# Patient Record
Sex: FEMALE | Race: WHITE | NOT HISPANIC OR LATINO | Employment: OTHER | URBAN - METROPOLITAN AREA
[De-identification: names, ages, dates, MRNs, and addresses within clinical notes are randomized per-mention and may not be internally consistent; named-entity substitution may affect disease eponyms.]

---

## 2017-01-03 ENCOUNTER — OFFICE VISIT (OUTPATIENT)
Dept: INTERNAL MEDICINE | Age: 82
End: 2017-01-03

## 2017-01-03 VITALS
HEART RATE: 78 BPM | HEIGHT: 67 IN | DIASTOLIC BLOOD PRESSURE: 78 MMHG | SYSTOLIC BLOOD PRESSURE: 140 MMHG | TEMPERATURE: 97.7 F | BODY MASS INDEX: 22.91 KG/M2 | WEIGHT: 146 LBS | OXYGEN SATURATION: 98 %

## 2017-01-03 DIAGNOSIS — R05.9 COUGH: ICD-10-CM

## 2017-01-03 DIAGNOSIS — G62.9 PERIPHERAL POLYNEUROPATHY: ICD-10-CM

## 2017-01-03 DIAGNOSIS — Z00.00 MEDICARE ANNUAL WELLNESS VISIT, INITIAL: Primary | ICD-10-CM

## 2017-01-03 DIAGNOSIS — Z23 ENCOUNTER FOR IMMUNIZATION: ICD-10-CM

## 2017-01-03 DIAGNOSIS — E03.9 HYPOTHYROIDISM, UNSPECIFIED TYPE: ICD-10-CM

## 2017-01-03 DIAGNOSIS — E78.5 HYPERLIPIDEMIA, UNSPECIFIED HYPERLIPIDEMIA TYPE: ICD-10-CM

## 2017-01-03 DIAGNOSIS — I10 ESSENTIAL HYPERTENSION: ICD-10-CM

## 2017-01-03 PROCEDURE — 99214 OFFICE O/P EST MOD 30 MIN: CPT | Performed by: INTERNAL MEDICINE

## 2017-01-03 PROCEDURE — 90670 PCV13 VACCINE IM: CPT | Performed by: INTERNAL MEDICINE

## 2017-01-03 PROCEDURE — G0009 ADMIN PNEUMOCOCCAL VACCINE: HCPCS | Performed by: INTERNAL MEDICINE

## 2017-01-03 PROCEDURE — G0439 PPPS, SUBSEQ VISIT: HCPCS | Performed by: INTERNAL MEDICINE

## 2017-01-03 RX ORDER — AMITRIPTYLINE HYDROCHLORIDE 25 MG/1
25 TABLET, FILM COATED ORAL NIGHTLY
Qty: 30 TABLET | Refills: 1 | Status: SHIPPED | OUTPATIENT
Start: 2017-01-03 | End: 2017-02-14 | Stop reason: SINTOL

## 2017-01-03 RX ORDER — AZELASTINE HYDROCHLORIDE, FLUTICASONE PROPIONATE 137; 50 UG/1; UG/1
2 SPRAY, METERED NASAL 2 TIMES DAILY
Qty: 1 BOTTLE | Refills: 1 | OUTPATIENT
Start: 2017-01-03 | End: 2021-06-13

## 2017-01-03 NOTE — MR AVS SNAPSHOT
Angela Quinn   1/3/2017 1:00 PM   Office Visit    Dept Phone:  [phone]   Encounter #:  [id number]    Provider:  Mychal Soliz MD   Department:  [facility] PRIMARY CARE                Your Full Care Plan              Today's Medication Changes          These changes are accurate as of: 1/3/17  1:53 PM.  If you have any questions, ask your nurse or doctor.               New Medication(s)Ordered:     amitriptyline 25 MG tablet   Commonly known as:  ELAVIL   Take 1 tablet by mouth Every Night.   Started by:  Mychal Soliz MD       Azelastine-Fluticasone 137-50 MCG/ACT suspension   2 sprays into each nostril 2 (Two) Times a Day.   Started by:  Mychal Soliz MD            Where to Get Your Medications      These medications were sent to [facility] [address] [place] [address] - [address] [place] [address] - [phone]  - [phone] [place] [address]     Phone:  [phone]     amitriptyline 25 MG tablet    Azelastine-Fluticasone 137-50 MCG/ACT suspension                  Your Updated Medication List          This list is accurate as of: 1/3/17  1:53 PM.  Always use your most recent med list.                alendronate 35 MG tablet   Commonly known as:  FOSAMAX   Take 1 tablet by mouth every 7 days.       amitriptyline 25 MG tablet   Commonly known as:  ELAVIL   Take 1 tablet by mouth Every Night.       anastrozole 1 MG tablet   Commonly known as:  ARIMIDEX   TAKE 1 TABLET BY MOUTH ONE TIME A DAY       atorvastatin 10 MG tablet   Commonly known as:  LIPITOR   Take 1 tablet by mouth daily.       Azelastine-Fluticasone 137-50 MCG/ACT suspension   2 sprays into each nostril 2 (Two) Times a Day.       escitalopram 20 MG tablet   Commonly known as:  LEXAPRO   Take 0.5 tablets by mouth daily.       hydrochlorothiazide 25 MG tablet   Commonly known as:  HYDRODIURIL   Take 1 tablet by mouth daily.       levothyroxine 75 MCG tablet   Commonly known  as:  SYNTHROID, LEVOTHROID   Take 1 tablet by mouth daily.               We Performed the Following     CBC & Differential     Comprehensive Metabolic Panel     Lipid Panel     Pneumococcal Conjugate Vaccine 13-Valent All     Protein Electrophoresis, Total     RPR     TSH     Vitamin B12       You Were Diagnosed With        Codes Comments    Medicare annual wellness visit, initial    -  Primary ICD-10-CM: Z00.00  ICD-9-CM: V70.0     Essential hypertension     ICD-10-CM: I10  ICD-9-CM: 401.9     Hypothyroidism, unspecified type     ICD-10-CM: E03.9  ICD-9-CM: 244.9     Hyperlipidemia, unspecified hyperlipidemia type     ICD-10-CM: E78.5  ICD-9-CM: 272.4     Encounter for immunization     ICD-10-CM: Z23  ICD-9-CM: V03.89     Cough     ICD-10-CM: R05  ICD-9-CM: 786.2     Peripheral polyneuropathy     ICD-10-CM: G62.9  ICD-9-CM: 356.9       Instructions      Medicare Wellness  Personal Prevention Plan of Service     Date of Office Visit:  2017  Encounter Provider:  Mychal Soliz MD  Place of Service:  [facility] PRIMARY CARE  Patient Name: Angela Quinn  :  1931    As part of the Medicare Wellness portion of your visit today, we are providing you with this personalized preventive plan of services (PPPS). This plan is based upon recommendations of the [place] Preventive Services Task Force (USPSTF) and the Advisory Committee on Immunization Practices (ACIP).    This lists the preventive care services that should be considered, and provides dates of when you are due. Items listed as completed are up-to-date and do not require any further intervention.    Health Maintenance   Topic Date Due   • LIPID PANEL  10/18/2017   • COLONOSCOPY  2017   • MEDICARE ANNUAL WELLNESS  2018   • DXA SCAN  2018   • MAMMOGRAM  2018   • TDAP/TD VACCINES (2 - Td) 2026   • INFLUENZA VACCINE  Completed   • PNEUMOCOCCAL VACCINES (65+ LOW/MEDIUM RISK)  Completed   • ZOSTER  VACCINE  Completed                Patient Instructions History      Upcoming Appointments     Visit Type Date Time Department    WELCOME TO MEDICARE 1/3/2017  1:00 PM [facility]    [facility] 2017  9:30 AM [facility]    OFFICE VISIT 2017  2:00 PM [facility]    LAB 5/15/2017 10:00 AM  [facility]    FOLLOW UP 1 UNIT 5/15/2017 10:40 AM [facility]      Amigo[employer]art Signup     [facility] [employer] allows you to send messages to your doctor, view your test results, renew your prescriptions, schedule appointments, and more. To sign up, go to [employer] and click on the Sign Up Now link in the New User? box. Enter your [employer] Activation Code exactly as it appears below along with the last four digits of your Social Security Number and your Date of Birth () to complete the sign-up process. If you do not sign up before the expiration date, you must request a new code.    Aff[employer] Activation Code: [email or web address]  Expires: 2017  1:51 PM    If you have questions, you can email [email or web address] or call [phone] to talk to our [employer] staff. Remember, [employer] is NOT to be used for urgent needs. For medical emergencies, dial 911.               Other Info from Your Visit           Your Appointments     2017  9:30 AM EST   [facility] (--[facility]    [address] [facility] [address]  [facility] [id number] [phone]            2017  2:00 PM EST   Office Visit with Mychal Soliz MD   [facility] PRIMARY CARE (--)    [address] [facility] [address]  [facility] [address] [phone]           Arrive 15 minutes prior to appointment.            May 15, 2017 10:00 AM EDT   Lab with LAB CHAIR 2 [facility] ([place])    [address] [address]   [phone]            May 15, 2017 10:40 AM EDT   FOLLOW UP  "with Marla Lyons MD   [facility]: [facility] BLOOD DISORDERS AND CANCER ([facility] [place])    [address] [address] [phone]              Allergies     No Known Drug Allergy        Reason for Visit     Annual Exam AWV      Vital Signs     Blood Pressure Pulse Temperature Height Weight Oxygen Saturation    140/78 78 97.7 °F (36.5 °C) 67\" (170.2 cm) 146 lb (66.2 kg) 98%    Body Mass Index Smoking Status                22.87 kg/m2 Never Smoker          Problems and Diagnoses Noted     Cough    Encounter for immunization    High cholesterol or triglycerides    High blood pressure    Underactive thyroid    Medicare annual wellness visit, initial    Sleep apnea    Disease of nerves in the arms, legs, hands and feet      Immunizations Administered     Name Date    Pneumococcal Conjugate 13-Valent         "

## 2017-01-03 NOTE — PATIENT INSTRUCTIONS
Medicare Wellness  Personal Prevention Plan of Service     Date of Office Visit:  2017  Encounter Provider:  Mychal Soliz MD  Place of Service:  [facility] PRIMARY CARE  Patient Name: Angela Quinn  :  1931    As part of the Medicare Wellness portion of your visit today, we are providing you with this personalized preventive plan of services (PPPS). This plan is based upon recommendations of the [place] Preventive Services Task Force (USPSTF) and the Advisory Committee on Immunization Practices (ACIP).    This lists the preventive care services that should be considered, and provides dates of when you are due. Items listed as completed are up-to-date and do not require any further intervention.    Health Maintenance   Topic Date Due   • LIPID PANEL  10/18/2017   • COLONOSCOPY  2017   • MEDICARE ANNUAL WELLNESS  2018   • DXA SCAN  2018   • MAMMOGRAM  2018   • TDAP/TD VACCINES (2 - Td) 2026   • INFLUENZA VACCINE  Completed   • PNEUMOCOCCAL VACCINES (65+ LOW/MEDIUM RISK)  Completed   • ZOSTER VACCINE  Completed

## 2017-01-05 ENCOUNTER — TELEPHONE (OUTPATIENT)
Dept: INTERNAL MEDICINE | Age: 82
End: 2017-01-05

## 2017-01-05 LAB
ALBUMIN SERPL ELPH-MCNC: 3.7 G/DL (ref 2.9–4.4)
ALBUMIN SERPL-MCNC: 4.3 G/DL (ref 3.5–5.2)
ALBUMIN/GLOB SERPL: 1 {RATIO} (ref 0.7–1.7)
ALBUMIN/GLOB SERPL: 1.4 G/DL
ALP SERPL-CCNC: 106 U/L (ref 39–117)
ALPHA1 GLOB SERPL ELPH-MCNC: 0.3 G/DL (ref 0–0.4)
ALPHA2 GLOB SERPL ELPH-MCNC: 0.7 G/DL (ref 0.4–1)
ALT SERPL-CCNC: 13 U/L (ref 1–33)
AST SERPL-CCNC: 22 U/L (ref 1–32)
B-GLOBULIN SERPL ELPH-MCNC: 1.3 G/DL (ref 0.7–1.3)
BASOPHILS # BLD AUTO: 0.04 10*3/MM3 (ref 0–0.2)
BASOPHILS NFR BLD AUTO: 0.8 % (ref 0–1.5)
BILIRUB SERPL-MCNC: 0.9 MG/DL (ref 0.1–1.2)
BUN SERPL-MCNC: 11 MG/DL (ref 8–23)
BUN/CREAT SERPL: 15.7 (ref 7–25)
CALCIUM SERPL-MCNC: 9.5 MG/DL (ref 8.6–10.5)
CHLORIDE SERPL-SCNC: 94 MMOL/L (ref 98–107)
CHOLEST SERPL-MCNC: 179 MG/DL (ref 0–200)
CO2 SERPL-SCNC: 32.2 MMOL/L (ref 22–29)
CREAT SERPL-MCNC: 0.7 MG/DL (ref 0.57–1)
EOSINOPHIL # BLD AUTO: 0.11 10*3/MM3 (ref 0–0.7)
EOSINOPHIL NFR BLD AUTO: 2.1 % (ref 0.3–6.2)
ERYTHROCYTE [DISTWIDTH] IN BLOOD BY AUTOMATED COUNT: 13.3 % (ref 11.7–13)
GAMMA GLOB SERPL ELPH-MCNC: 1.4 G/DL (ref 0.4–1.8)
GLOBULIN SER CALC-MCNC: 3.1 GM/DL
GLOBULIN SER CALC-MCNC: 3.7 G/DL (ref 2.2–3.9)
GLUCOSE SERPL-MCNC: 98 MG/DL (ref 65–99)
HCT VFR BLD AUTO: 41.8 % (ref 35.6–45.5)
HDLC SERPL-MCNC: 67 MG/DL (ref 40–60)
HGB BLD-MCNC: 13.4 G/DL (ref 11.9–15.5)
IMM GRANULOCYTES # BLD: 0 10*3/MM3 (ref 0–0.03)
IMM GRANULOCYTES NFR BLD: 0 % (ref 0–0.5)
LDLC SERPL CALC-MCNC: 82 MG/DL (ref 0–100)
LYMPHOCYTES # BLD AUTO: 1.5 10*3/MM3 (ref 0.9–4.8)
LYMPHOCYTES NFR BLD AUTO: 28.7 % (ref 19.6–45.3)
Lab: NORMAL
M PROTEIN SERPL ELPH-MCNC: NORMAL G/DL
MCH RBC QN AUTO: 32 PG (ref 26.9–32)
MCHC RBC AUTO-ENTMCNC: 32.1 G/DL (ref 32.4–36.3)
MCV RBC AUTO: 99.8 FL (ref 80.5–98.2)
MONOCYTES # BLD AUTO: 0.55 10*3/MM3 (ref 0.2–1.2)
MONOCYTES NFR BLD AUTO: 10.5 % (ref 5–12)
NEUTROPHILS # BLD AUTO: 3.02 10*3/MM3 (ref 1.9–8.1)
NEUTROPHILS NFR BLD AUTO: 57.9 % (ref 42.7–76)
PLATELET # BLD AUTO: 221 10*3/MM3 (ref 140–500)
POTASSIUM SERPL-SCNC: 3.5 MMOL/L (ref 3.5–5.2)
PROT SERPL-MCNC: 7.4 G/DL (ref 6–8.5)
RBC # BLD AUTO: 4.19 10*6/MM3 (ref 3.9–5.2)
RPR SER QL: NORMAL
SODIUM SERPL-SCNC: 139 MMOL/L (ref 136–145)
TRIGL SERPL-MCNC: 148 MG/DL (ref 0–150)
TSH SERPL DL<=0.005 MIU/L-ACNC: 3.54 MIU/ML (ref 0.27–4.2)
VIT B12 SERPL-MCNC: 514 PG/ML (ref 211–946)
VLDLC SERPL CALC-MCNC: 29.6 MG/DL (ref 5–40)
WBC # BLD AUTO: 5.22 10*3/MM3 (ref 4.5–10.7)

## 2017-01-05 NOTE — TELEPHONE ENCOUNTER
PA for Dymista 137-[facility]/ACT denied via [employer]. Appeal letter will not be sent. Pt will have to try OTC fluticasone per Dr. Soliz.     KD

## 2017-01-13 ENCOUNTER — TELEPHONE (OUTPATIENT)
Dept: INTERNAL MEDICINE | Age: 82
End: 2017-01-13

## 2017-02-13 RX ORDER — ATORVASTATIN CALCIUM 10 MG/1
TABLET, FILM COATED ORAL
Qty: 30 TABLET | Refills: 3 | Status: SHIPPED | OUTPATIENT
Start: 2017-02-13 | End: 2017-11-27 | Stop reason: SDUPTHER

## 2017-02-14 ENCOUNTER — OFFICE VISIT (OUTPATIENT)
Dept: INTERNAL MEDICINE | Age: 82
End: 2017-02-14

## 2017-02-14 VITALS
BODY MASS INDEX: 23.02 KG/M2 | WEIGHT: 146.7 LBS | HEIGHT: 67 IN | TEMPERATURE: 97 F | OXYGEN SATURATION: 97 % | HEART RATE: 80 BPM

## 2017-02-14 DIAGNOSIS — I10 ESSENTIAL HYPERTENSION: ICD-10-CM

## 2017-02-14 DIAGNOSIS — G62.9 PERIPHERAL POLYNEUROPATHY: Primary | ICD-10-CM

## 2017-02-14 PROCEDURE — 99213 OFFICE O/P EST LOW 20 MIN: CPT | Performed by: INTERNAL MEDICINE

## 2017-02-14 RX ORDER — RISEDRONATE SODIUM 35 MG/1
35 TABLET, FILM COATED ORAL
COMMUNITY
End: 2018-02-16 | Stop reason: SDUPTHER

## 2017-02-14 RX ORDER — AMITRIPTYLINE HYDROCHLORIDE 25 MG/1
25 TABLET, FILM COATED ORAL NIGHTLY
Qty: 15 TABLET | Refills: 3 | COMMUNITY
Start: 2017-02-14 | End: 2017-11-21 | Stop reason: SDUPTHER

## 2017-02-14 NOTE — PROGRESS NOTES
Subjective   Angela Quinn is a 85 y.o. female.     History of Present Illness patient returns today to follow-up treatment of peripheral neuropathy (which she describes a burning in her feet), with amitriptyline.  She does believe the medication help with her symptoms, but unfortunately she experienced a sore throat with use of the medication.  Subsequent to this medications were discontinued.  Her symptoms resolved within about 3 days.  Symptoms are not usually apparent during the course of the day when she is active, they usually only occur when lying in bed at night.    The following portions of the patient's history were reviewed and updated as appropriate: allergies, current medications, past social history and problem list.    Review of Systems   Respiratory: Negative for chest tightness and shortness of breath.    Cardiovascular: Negative for chest pain and palpitations.   Neurological: Negative for dizziness, syncope, speech difficulty, weakness, light-headedness and headaches.       Objective   Physical Exam   Constitutional: She is oriented to person, place, and time. She appears well-developed and well-nourished. No distress.   Neurological: She is alert and oriented to person, place, and time.   Skin: Skin is warm and dry.   Psychiatric: She has a normal mood and affect. Her behavior is normal. Judgment and thought content normal.   Nursing note and vitals reviewed.      Assessment/Plan   Angela was seen today for peripheral neuropathy.    Diagnoses and all orders for this visit:    Peripheral polyneuropathy    Essential hypertension      Number-one recommend patient cut the pill in half and take it with food in the evening to see if we can control the side effect but maintain the beneficial effect of the medication.  Will contact me in approximately one week to let me know if this is been helpful or not.    Hypertension stable on current medication.  Plan: Continue meds, blood pressure check 4  months.

## 2017-03-03 RX ORDER — ANASTROZOLE 1 MG/1
TABLET ORAL
Qty: 90 TABLET | Refills: 0 | Status: SHIPPED | OUTPATIENT
Start: 2017-03-03 | End: 2017-06-11 | Stop reason: SDUPTHER

## 2017-04-12 DIAGNOSIS — R03.0 ELEVATED BLOOD PRESSURE READING WITHOUT DIAGNOSIS OF HYPERTENSION: ICD-10-CM

## 2017-04-12 DIAGNOSIS — E03.9 ADULT HYPOTHYROIDISM: ICD-10-CM

## 2017-04-13 DIAGNOSIS — R03.0 ELEVATED BLOOD PRESSURE READING WITHOUT DIAGNOSIS OF HYPERTENSION: ICD-10-CM

## 2017-04-13 DIAGNOSIS — E03.9 ADULT HYPOTHYROIDISM: ICD-10-CM

## 2017-04-13 DIAGNOSIS — F32.A DEPRESSION, UNSPECIFIED DEPRESSION TYPE: Primary | ICD-10-CM

## 2017-04-13 RX ORDER — ESCITALOPRAM OXALATE 20 MG/1
TABLET ORAL
Qty: 45 TABLET | Refills: 0 | Status: CANCELLED | OUTPATIENT
Start: 2017-04-13

## 2017-04-13 RX ORDER — HYDROCHLOROTHIAZIDE 25 MG/1
TABLET ORAL
Qty: 90 TABLET | Refills: 1 | Status: CANCELLED | OUTPATIENT
Start: 2017-04-13

## 2017-04-13 RX ORDER — LEVOTHYROXINE SODIUM 0.07 MG/1
TABLET ORAL
Qty: 90 TABLET | Refills: 1 | Status: CANCELLED | OUTPATIENT
Start: 2017-04-13

## 2017-04-13 RX ORDER — LEVOTHYROXINE SODIUM 0.07 MG/1
75 TABLET ORAL DAILY
Qty: 90 TABLET | Refills: 1 | Status: SHIPPED | OUTPATIENT
Start: 2017-04-13 | End: 2017-04-18 | Stop reason: SDUPTHER

## 2017-04-13 RX ORDER — HYDROCHLOROTHIAZIDE 25 MG/1
25 TABLET ORAL DAILY
Qty: 90 TABLET | Refills: 1 | Status: SHIPPED | OUTPATIENT
Start: 2017-04-13 | End: 2017-04-18 | Stop reason: SDUPTHER

## 2017-04-13 RX ORDER — ESCITALOPRAM OXALATE 20 MG/1
10 TABLET ORAL DAILY
Qty: 45 TABLET | Refills: 1 | Status: SHIPPED | OUTPATIENT
Start: 2017-04-13 | End: 2017-04-18 | Stop reason: SDUPTHER

## 2017-04-18 DIAGNOSIS — R03.0 ELEVATED BLOOD PRESSURE READING WITHOUT DIAGNOSIS OF HYPERTENSION: ICD-10-CM

## 2017-04-18 DIAGNOSIS — E03.9 ADULT HYPOTHYROIDISM: ICD-10-CM

## 2017-04-18 DIAGNOSIS — F32.A DEPRESSION, UNSPECIFIED DEPRESSION TYPE: ICD-10-CM

## 2017-04-18 RX ORDER — HYDROCHLOROTHIAZIDE 25 MG/1
25 TABLET ORAL DAILY
Qty: 90 TABLET | Refills: 1 | Status: SHIPPED | OUTPATIENT
Start: 2017-04-18 | End: 2017-04-18 | Stop reason: SDUPTHER

## 2017-04-18 RX ORDER — HYDROCHLOROTHIAZIDE 25 MG/1
25 TABLET ORAL DAILY
Qty: 7 TABLET | Refills: 0 | Status: SHIPPED | OUTPATIENT
Start: 2017-04-18 | End: 2017-11-27 | Stop reason: SDUPTHER

## 2017-04-18 RX ORDER — LEVOTHYROXINE SODIUM 0.07 MG/1
75 TABLET ORAL DAILY
Qty: 7 TABLET | Refills: 0 | Status: SHIPPED | OUTPATIENT
Start: 2017-04-18 | End: 2017-06-13

## 2017-04-18 RX ORDER — ESCITALOPRAM OXALATE 20 MG/1
10 TABLET ORAL DAILY
Qty: 45 TABLET | Refills: 1 | Status: SHIPPED | OUTPATIENT
Start: 2017-04-18 | End: 2018-06-25 | Stop reason: ALTCHOICE

## 2017-04-18 RX ORDER — LEVOTHYROXINE SODIUM 0.07 MG/1
75 TABLET ORAL DAILY
Qty: 90 TABLET | Refills: 1 | Status: SHIPPED | OUTPATIENT
Start: 2017-04-18 | End: 2017-07-12 | Stop reason: SDUPTHER

## 2017-04-18 NOTE — TELEPHONE ENCOUNTER
Rx's were sent to incorrect pharmacy, local [facility] pharmacy.     [facility]    Pt will need short term supply of two Rx's sent to local pharmacy. Rx's sent to local pharmacy for 7-day qty.    Local pharmacy was called and told to cancel the 90-day Rx's incorrectly sent to them.    KD

## 2017-05-15 ENCOUNTER — TRANSCRIBE ORDERS (OUTPATIENT)
Dept: ADMINISTRATIVE | Facility: HOSPITAL | Age: 82
End: 2017-05-15

## 2017-05-15 ENCOUNTER — OFFICE VISIT (OUTPATIENT)
Dept: ONCOLOGY | Facility: CLINIC | Age: 82
End: 2017-05-15

## 2017-05-15 ENCOUNTER — LAB (OUTPATIENT)
Dept: LAB | Facility: HOSPITAL | Age: 82
End: 2017-05-15

## 2017-05-15 VITALS
BODY MASS INDEX: 22.98 KG/M2 | HEART RATE: 82 BPM | SYSTOLIC BLOOD PRESSURE: 122 MMHG | DIASTOLIC BLOOD PRESSURE: 70 MMHG | WEIGHT: 146.4 LBS | RESPIRATION RATE: 16 BRPM | TEMPERATURE: 98.7 F | OXYGEN SATURATION: 96 % | HEIGHT: 67 IN

## 2017-05-15 DIAGNOSIS — M81.0 OSTEOPOROSIS: Primary | ICD-10-CM

## 2017-05-15 DIAGNOSIS — Z12.31 SCREENING MAMMOGRAM, ENCOUNTER FOR: ICD-10-CM

## 2017-05-15 DIAGNOSIS — C50.919 MALIGNANT NEOPLASM OF FEMALE BREAST, UNSPECIFIED LATERALITY, UNSPECIFIED SITE OF BREAST: ICD-10-CM

## 2017-05-15 DIAGNOSIS — C50.919 MALIGNANT NEOPLASM OF FEMALE BREAST, UNSPECIFIED LATERALITY, UNSPECIFIED SITE OF BREAST: Primary | ICD-10-CM

## 2017-05-15 DIAGNOSIS — Z79.899 ENCOUNTER FOR LONG-TERM (CURRENT) USE OF HIGH-RISK MEDICATION: ICD-10-CM

## 2017-05-15 DIAGNOSIS — Z13.9 SCREENING: Primary | ICD-10-CM

## 2017-05-15 DIAGNOSIS — Z12.39 BREAST CANCER SCREENING: ICD-10-CM

## 2017-05-15 LAB
ALBUMIN SERPL-MCNC: 3.7 G/DL (ref 3.5–5.2)
ALBUMIN/GLOB SERPL: 1.1 G/DL (ref 1.1–2.4)
ALP SERPL-CCNC: 86 U/L (ref 38–116)
ALT SERPL W P-5'-P-CCNC: 11 U/L (ref 0–33)
ANION GAP SERPL CALCULATED.3IONS-SCNC: 12.3 MMOL/L
AST SERPL-CCNC: 21 U/L (ref 0–32)
BASOPHILS # BLD AUTO: 0.07 10*3/MM3 (ref 0–0.1)
BASOPHILS NFR BLD AUTO: 1 % (ref 0–1.1)
BILIRUB SERPL-MCNC: 0.9 MG/DL (ref 0.1–1.2)
BUN BLD-MCNC: 11 MG/DL (ref 6–20)
BUN/CREAT SERPL: 16.9 (ref 7.3–30)
CALCIUM SPEC-SCNC: 9.1 MG/DL (ref 8.5–10.2)
CHLORIDE SERPL-SCNC: 98 MMOL/L (ref 98–107)
CO2 SERPL-SCNC: 30.7 MMOL/L (ref 22–29)
CREAT BLD-MCNC: 0.65 MG/DL (ref 0.6–1.1)
DEPRECATED RDW RBC AUTO: 47.3 FL (ref 37–49)
EOSINOPHIL # BLD AUTO: 0.16 10*3/MM3 (ref 0–0.36)
EOSINOPHIL NFR BLD AUTO: 2.3 % (ref 1–5)
ERYTHROCYTE [DISTWIDTH] IN BLOOD BY AUTOMATED COUNT: 13.3 % (ref 11.7–14.5)
GFR SERPL CREATININE-BSD FRML MDRD: 87 ML/MIN/1.73
GLOBULIN UR ELPH-MCNC: 3.5 GM/DL (ref 1.8–3.5)
GLUCOSE BLD-MCNC: 97 MG/DL (ref 74–124)
HCT VFR BLD AUTO: 37 % (ref 34–45)
HGB BLD-MCNC: 12.1 G/DL (ref 11.5–14.9)
HOLD SPECIMEN: NORMAL
IMM GRANULOCYTES # BLD: 0.02 10*3/MM3 (ref 0–0.03)
IMM GRANULOCYTES NFR BLD: 0.3 % (ref 0–0.5)
LYMPHOCYTES # BLD AUTO: 1.2 10*3/MM3 (ref 1–3.5)
LYMPHOCYTES NFR BLD AUTO: 17 % (ref 20–49)
MCH RBC QN AUTO: 31.6 PG (ref 27–33)
MCHC RBC AUTO-ENTMCNC: 32.7 G/DL (ref 32–35)
MCV RBC AUTO: 96.6 FL (ref 83–97)
MONOCYTES # BLD AUTO: 0.8 10*3/MM3 (ref 0.25–0.8)
MONOCYTES NFR BLD AUTO: 11.3 % (ref 4–12)
NEUTROPHILS # BLD AUTO: 4.81 10*3/MM3 (ref 1.5–7)
NEUTROPHILS NFR BLD AUTO: 68.1 % (ref 39–75)
NRBC BLD MANUAL-RTO: 0 /100 WBC (ref 0–0)
PLATELET # BLD AUTO: 193 10*3/MM3 (ref 150–375)
PMV BLD AUTO: 10.4 FL (ref 8.9–12.1)
POTASSIUM BLD-SCNC: 3.6 MMOL/L (ref 3.5–4.7)
PROT SERPL-MCNC: 7.2 G/DL (ref 6.3–8)
RBC # BLD AUTO: 3.83 10*6/MM3 (ref 3.9–5)
SODIUM BLD-SCNC: 141 MMOL/L (ref 134–145)
WBC NRBC COR # BLD: 7.06 10*3/MM3 (ref 4–10)

## 2017-05-15 PROCEDURE — 36415 COLL VENOUS BLD VENIPUNCTURE: CPT

## 2017-05-15 PROCEDURE — 80053 COMPREHEN METABOLIC PANEL: CPT

## 2017-05-15 PROCEDURE — 85025 COMPLETE CBC W/AUTO DIFF WBC: CPT

## 2017-05-15 PROCEDURE — 99213 OFFICE O/P EST LOW 20 MIN: CPT | Performed by: INTERNAL MEDICINE

## 2017-05-23 ENCOUNTER — APPOINTMENT (OUTPATIENT)
Dept: BONE DENSITY | Facility: HOSPITAL | Age: 82
End: 2017-05-23
Attending: INTERNAL MEDICINE

## 2017-06-12 RX ORDER — ANASTROZOLE 1 MG/1
TABLET ORAL
Qty: 90 TABLET | Refills: 0 | Status: SHIPPED | OUTPATIENT
Start: 2017-06-12 | End: 2017-06-13 | Stop reason: SDUPTHER

## 2017-06-13 ENCOUNTER — LAB (OUTPATIENT)
Dept: LAB | Facility: HOSPITAL | Age: 82
End: 2017-06-13

## 2017-06-13 ENCOUNTER — OFFICE VISIT (OUTPATIENT)
Dept: ONCOLOGY | Facility: CLINIC | Age: 82
End: 2017-06-13

## 2017-06-13 ENCOUNTER — DOCUMENTATION (OUTPATIENT)
Dept: ONCOLOGY | Facility: CLINIC | Age: 82
End: 2017-06-13

## 2017-06-13 VITALS
TEMPERATURE: 98.3 F | HEIGHT: 67 IN | OXYGEN SATURATION: 98 % | BODY MASS INDEX: 22.51 KG/M2 | HEART RATE: 75 BPM | WEIGHT: 143.4 LBS | RESPIRATION RATE: 16 BRPM | SYSTOLIC BLOOD PRESSURE: 122 MMHG | DIASTOLIC BLOOD PRESSURE: 58 MMHG

## 2017-06-13 DIAGNOSIS — C50.919 MALIGNANT NEOPLASM OF FEMALE BREAST, UNSPECIFIED LATERALITY, UNSPECIFIED SITE OF BREAST: Primary | ICD-10-CM

## 2017-06-13 LAB
BASOPHILS # BLD AUTO: 0.07 10*3/MM3 (ref 0–0.1)
BASOPHILS NFR BLD AUTO: 0.8 % (ref 0–1.1)
DEPRECATED RDW RBC AUTO: 45.3 FL (ref 37–49)
EOSINOPHIL # BLD AUTO: 0.2 10*3/MM3 (ref 0–0.36)
EOSINOPHIL NFR BLD AUTO: 2.3 % (ref 1–5)
ERYTHROCYTE [DISTWIDTH] IN BLOOD BY AUTOMATED COUNT: 13.1 % (ref 11.7–14.5)
HCT VFR BLD AUTO: 37.5 % (ref 34–45)
HGB BLD-MCNC: 12.8 G/DL (ref 11.5–14.9)
IMM GRANULOCYTES # BLD: 0.02 10*3/MM3 (ref 0–0.03)
IMM GRANULOCYTES NFR BLD: 0.2 % (ref 0–0.5)
LYMPHOCYTES # BLD AUTO: 1.78 10*3/MM3 (ref 1–3.5)
LYMPHOCYTES NFR BLD AUTO: 20.6 % (ref 20–49)
MCH RBC QN AUTO: 32.2 PG (ref 27–33)
MCHC RBC AUTO-ENTMCNC: 34.1 G/DL (ref 32–35)
MCV RBC AUTO: 94.2 FL (ref 83–97)
MONOCYTES # BLD AUTO: 0.92 10*3/MM3 (ref 0.25–0.8)
MONOCYTES NFR BLD AUTO: 10.7 % (ref 4–12)
NEUTROPHILS # BLD AUTO: 5.63 10*3/MM3 (ref 1.5–7)
NEUTROPHILS NFR BLD AUTO: 65.4 % (ref 39–75)
NRBC BLD MANUAL-RTO: 0 /100 WBC (ref 0–0)
PLATELET # BLD AUTO: 196 10*3/MM3 (ref 150–375)
PMV BLD AUTO: 10.9 FL (ref 8.9–12.1)
RBC # BLD AUTO: 3.98 10*6/MM3 (ref 3.9–5)
WBC NRBC COR # BLD: 8.62 10*3/MM3 (ref 4–10)

## 2017-06-13 PROCEDURE — 36416 COLLJ CAPILLARY BLOOD SPEC: CPT

## 2017-06-13 PROCEDURE — 85025 COMPLETE CBC W/AUTO DIFF WBC: CPT

## 2017-06-13 PROCEDURE — 99213 OFFICE O/P EST LOW 20 MIN: CPT | Performed by: INTERNAL MEDICINE

## 2017-06-13 RX ORDER — ANASTROZOLE 1 MG/1
1 TABLET ORAL DAILY
Qty: 90 TABLET | Refills: 0 | Status: SHIPPED | OUTPATIENT
Start: 2017-06-13 | End: 2017-06-13 | Stop reason: SDUPTHER

## 2017-06-13 RX ORDER — ANASTROZOLE 1 MG/1
1 TABLET ORAL DAILY
Qty: 90 TABLET | Refills: 0 | Status: SHIPPED | OUTPATIENT
Start: 2017-06-13 | End: 2017-09-15 | Stop reason: SDUPTHER

## 2017-06-13 NOTE — PROGRESS NOTES
Rec VM from Dr Lyosn asking me to contact [facility] pharmacy and cancel the Arimidex rx that she escribed. Pt prefers to get her medication through [facility] (no cost to her). I have contacted [facility] [phone] and canceled the rx. I have re-escribed the rx to [facility] mail order pharmacy.

## 2017-06-13 NOTE — PROGRESS NOTES
Subjective .     REASONS FOR FOLLOWUP:    [id number] invasive ductal carcinoma in the left breast. The mammogram showed the lesion to be 1.3 x 1.7 cm. She had a digital mammogram that showed a 1.8 cm mass in the left breast at 6 o'clock position. The biopsy was consistent wi t h invasive ductal carcinoma, grade 2, Jbphh score of 6 out of 9, ER positive greater than 90%, VA positive greater than 90%, HER-2/shannon 2+ by immunohistochemistry but negative by FISH. She is status post lumpectomy which showed a 1.3 x 1.7 cm mass, gr a de 2, Kallie score of 6 out of 9 and there was a component of DCIS. A second focus of DCIS was present along the superior margin away from the actual mass and the superior margin was involved. As a result she underwent re-excision and this was negati ve. She had 3 lymph nodes removed and there was macrometastasis in 1 lymph node which was about 0.4 cm. Currently the patient refuses chemotherapy and is going to be started on hormonal treatment with Arimidex.      HISTORY OF PRESENT ILLNESS:  The patient is a 85 y.o. year old female who is here for follow-up with the above-mentioned history.    History of Present Illness     The patient is an elderly female, currently here for followup. She is on Arimidex for her [id number] invasive ductal carcinoma of the left breast. She was seen last on 06/08/2015 with plans of continuing Arimidex. She has some m ild hot flashes related to Arimidex. Otherwise, she is doing very well. She had a mammogram back in May 2016 and that was negative. She is not due until May 2017.  Patient was to follow-up after the mammogram and bone density was done.  But it has not been done yet.  It is scheduled for in the next couple weeks.    PAST MEDICAL HISTORY:   1. Significant for high cholesterol.   2. Hyperthyroidism.   3. Depression.   4. History of hypertension.  5.    PAST SURGICAL HISTORY: Three C-sections.     OB/GYN HISTORY: She started menstrual periods at age  14. She attained menopause around 50. She is  3, para 3, no miscarriages. She was 23 years old when she had her first child and 29 when she had the last one. Her children are 59, 58 and 54 years old. Two girls and 1 boy.     ONCOLOGIC HISTORY      ONCOLOGIC HISTORY: The patient is an 82-year-old female who likes to be called Tati who has noticed a bump in the left breast. As a result, she went to her primary care physician and mammogram was ordered. On 2014 she underwent mammogram at [facility] and the mammogram showed evidence of a 1.3 x 0.7 x 0.9 cm irregular hypoechoic mass that extends into the skin and in the left breast at the 7 o'clock position 11 cm from the nipple. As a result a biopsy was recommended. The mammogra m on the right was negative. She underwent a bilateral digital mammogram which showed that she had scattered fibroglandular densities throughout both breasts within the posterior one-third of the left breast at the 6 o'clock position . At the inframammary fold there was an irregular mass that measured 1.8 cm in greatest dimension with spiculation and architectural distortion noted. No abnormality was seen on the right breast. Mildly prominent left axillary lymph node was noted. Ultrasound was done which showed that there was a 1.3 x 0.7 x 0.9 cm irregular mass that extended into the skin. As a result, the patient underwent ultrasound-guided biopsy on 2014. The pathology, accession #[id number]. 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 The histologic grade was 2, Kallie score was 6 out of 9 and the maximal span of invasive carcinoma in the core was 8 mm. The estrogen receptors were done and was greater than 90%, progestero n e receptor was 90%, HER-2 by immunohistochemistry was 2+ but by FISH HER/2 ratio was 1.4 which is negative. Subsequently, the patient was referred to   [facility] and MRI of the breast was performed on 05/02/2014 and MRI showed that within the left breas t at 6 o'clock position in the posterior one-third near the inframammary fold there was an irregular enhancing mass with internal metallic clip. There was linear enhancement extending anteriorly away from the mass. The mass itself measured 2 cm in the sup e rior to inferior dimension, 2 cm in anterior to posterior dimension and 1.9 cm in the medial to lateral dimension and extending anteriorly away from the mass and contiguous with the mass is a linear clump enhancement that measured about 2.5 cm in the ant e rior posterior dimension. This brings the greatest dimension of the mass and adjacent near clump enhancement to be a total of 4.4 cm. There were no other areas that were suspicious. The right breast was negative. As a result the patient underwent surger y . She underwent left breast lumpectomy with sentinel lymph node sampling on 05/22/2014. The pathology accession number is [id number]. Pathology is consistent with invasive ductal carcinoma intermediate grade, Duxbury score 6, tumor size 1.5 x 1.3 x 1.2 c m. 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 Intermediate nuclear grade minor component margins were focally involved by DCIS of the superior margin. There is a focus of intermediate grade DCIS located approximately 1.3 cm from the invasive carcinoma, which involves the superior margin and distance of invasive carcinoma from the closest margin was 1 mm superiorly and distance of in situ carcinoma from the closest margins was involved superiorly. There were 3 lymph nodes removed. There was micrometast a sis in 1 lymph node and the size of the micrometastasis was 0.4 cm and it is a [id number] invasive ductal carcinoma. There was tumor invasion of dermis present. The patient then had to undergo a second surgery on 06/11/2014 because of positive margin. She u nderwent re-excision of the superior margin of the left  breast. The pathology accession number is [id number] and the patient underwent a left breast lumpectomy re-excision. Focal non-atypical ductal hyperplasia, extensive fat necrosis with fibrosis and mix e d inflammation, no atypia. The patient then was referred here for the evaluation of options of treatment. She has already seen Dr. Pat Sage yesterday with plans of starting radiation on Monday. She has healed up from the surgery. She is here to disc uss options of further treatment from us. Currently she has no complaints except for a cough, which is chronic, which is likely secondary to allergies as the cough gets worse during the allergy season.    Patient has been on Arimidex since 2014 with olive ns to continue a total of 5 years.    Radiation between 2014 to 2014.    The 2015 mammogram is benign. We will continue Arimidex.    2016 mammogram negative.    MEDICATIONS: The current medication list was reviewed with the patient and updates in the EMR this date per the medical assistant. Medication dosages and frequencies were confirmed to be accurate.     ALLERGIES: Not allergic to any medications.     SOCIAL HISTORY: She is  and lives with her . She is very much into gardening and reading. She does not smoke. She does not drink alcohol. She has no history of any previous blood transfusions  .   FAMILY HISTORY: Father  of a heart attack at 69. Mother had colon cancer and  of colon cancer at 76. She has 1 brother who is 77 and in good health. There is no other family history of breast cancer.             Current Outpatient Prescriptions on File Prior to Visit   Medication Sig Dispense Refill   • amitriptyline (ELAVIL) 25 MG tablet Take 1 tablet by mouth Every Night. Half tablet by mouth at bedtime 15 tablet 3   • atorvastatin (LIPITOR) 10 MG tablet TAKE 1 TABLET BY MOUTH ONE TIME A DAY  30 tablet 3   • Azelastine-Fluticasone 137-50 MCG/ACT suspension 2 sprays into each  "nostril 2 (Two) Times a Day. 1 bottle 1   • Calcium Carb-Cholecalciferol (CALCIUM 1000 + D) 1000-800 MG-UNIT tablet Take  by mouth.     • escitalopram (LEXAPRO) 20 MG tablet Take 0.5 tablets by mouth Daily. 45 tablet 1   • hydrochlorothiazide (HYDRODIURIL) 25 MG tablet Take 1 tablet by mouth Daily. 7 tablet 0   • levothyroxine (SYNTHROID, LEVOTHROID) 75 MCG tablet Take 1 tablet by mouth Daily. 90 tablet 1   • risedronate (ACTONEL) 35 MG tablet Take 35 mg by mouth Every 7 (Seven) Days.     • [DISCONTINUED] anastrozole (ARIMIDEX) 1 MG tablet TAKE 1 TABLET BY MOUTH ONE TIME A DAY  90 tablet 0   • [DISCONTINUED] levothyroxine (SYNTHROID, LEVOTHROID) 75 MCG tablet Take 1 tablet by mouth Daily. 7 tablet 0     No current facility-administered medications on file prior to visit.          Review of Systems  A comprehensive 14 point review of systems was performed and was negative except as mentioned.    Objective      Vitals:    06/13/17 0952   BP: 122/58   Pulse: 75   Resp: 16   Temp: 98.3 °F (36.8 °C)   TempSrc: Oral   SpO2: 98%   Weight: 143 lb 6.4 oz (65 kg)   Height: 67.32\" (171 cm)   PainSc: 0-No pain     Current Status 6/13/2017   ECOG score 0       Physical Exam     GENERAL:  Well-developed, well-nourished in no acute distress.   SKIN:  Warm, dry without rashes, purpura or petechiae.  HEAD:  Normocephalic.  EYES:  Pupils equal, round and reactive to light.  EOMs intact.  Conjunctivae normal.  EARS:  Hearing intact.  NOSE:  Septum midline.  No excoriations or nasal discharge.  MOUTH:  Tongue is well-papillated; no stomatitis or ulcers.  Lips normal.  THROAT:  Oropharynx without lesions or exudates.  NECK:  Supple with good range of motion; no thyromegaly or masses, no JVD.  LYMPHATICS:  No cervical, supraclavicular, axillary or inguinal adenopathy.  CHEST:  Lungs clear to percussion and auscultation. Good airflow.  CARDIAC:  Regular rate and rhythm without murmurs, rubs or gallops. Normal S1,S2.  ABDOMEN:  Soft, " nontender with no organomegaly or masses.  BREASTS: no breast masses, no axillary adenopthy, no nipple discharge. No supraclavicular adenopathy.  EXTREMITIES:  No clubbing, cyanosis or edema.  NEUROLOGICAL:  Cranial Nerves II-XII grossly intact.  No focal neurological deficits.  PSYCHIATRIC:  Normal affect and mood.          RECENT LABS:  Hematology WBC   Date Value Ref Range Status   06/13/2017 8.62 4.00 - 10.00 10*3/mm3 Final     RBC   Date Value Ref Range Status   06/13/2017 3.98 3.90 - 5.00 10*6/mm3 Final     Hemoglobin   Date Value Ref Range Status   06/13/2017 12.8 11.5 - 14.9 g/dL Final     Hematocrit   Date Value Ref Range Status   06/13/2017 37.5 34.0 - 45.0 % Final     Platelets   Date Value Ref Range Status   06/13/2017 196 150 - 375 10*3/mm3 Final        Assessment/Plan      1. This is a patient with history of T1N1MX invasive ductal carcinoma of the left breast status post surgery, status post radiation, currently she is tolerating Arimidex.      2. Mild hot flashes related to Arimidex.      3. Osteopenia for which she is taking calcium, vitamin D and Actonel.      4. Our plan is to continue Arimidex 1 mg daily.      5. We will plan to bring her back in 6 months with CBC, CMP, LDH and we will get a bone density prior to that.    6. Fu with survivorship clinic , will schedule at next appointment.    7.  Patient is to call me after mammogram and bone density test is done.  Which is scheduled in the next few weeks.  If the bone density shows still worsening then we should restart her bisphosphonate and we will discuss with Dr. Soliz who has discontinued Actonel as she has been on it for several years in the past.    MD Yudi Gan William P., MD

## 2017-06-22 ENCOUNTER — OFFICE VISIT (OUTPATIENT)
Dept: INTERNAL MEDICINE | Age: 82
End: 2017-06-22

## 2017-06-22 VITALS
HEIGHT: 67 IN | TEMPERATURE: 98.1 F | SYSTOLIC BLOOD PRESSURE: 136 MMHG | HEART RATE: 72 BPM | BODY MASS INDEX: 22.29 KG/M2 | OXYGEN SATURATION: 97 % | WEIGHT: 142 LBS | DIASTOLIC BLOOD PRESSURE: 68 MMHG

## 2017-06-22 DIAGNOSIS — R05.9 COUGH: ICD-10-CM

## 2017-06-22 DIAGNOSIS — N89.8 VAGINAL DISCHARGE: ICD-10-CM

## 2017-06-22 DIAGNOSIS — I10 ESSENTIAL HYPERTENSION: Primary | ICD-10-CM

## 2017-06-22 PROCEDURE — 99214 OFFICE O/P EST MOD 30 MIN: CPT | Performed by: INTERNAL MEDICINE

## 2017-06-22 NOTE — PROGRESS NOTES
"Angela Quinn / 85 y.o. / female  06/22/2017    ASSESSMENT & PLAN:    1. Essential hypertension    2. Vaginal discharge      No orders of the defined types were placed in this encounter.      Summary/Discussion:     · Number-one hypertension stable on current medical regimen.  Plan: Same meds, blood pressure check 6 months.  ·   #2 vaginal discharge, recommend patient see gynecology regarding this issue.    #3 cough associated with throat clearing, consistent clinically with reflux, as patient use Prilosec 20 mg a day for 4 weeks and she will contact me by phone in a month with an update on her symptoms.    Return in about 6 months (around 12/22/2017) for Blood pressure check.    Future Appointments  Date Time Provider Department Center   11/28/2017 9:00 AM LAB CHAIR 5 [facility]  [facility]   11/28/2017 9:40 AM Marla Lyons MD [facility]  [facility]       ______________________________________________________    VITALS    /68  Pulse 72  Temp 98.1 °F (36.7 °C)  Ht 67.32\" (171 cm)  Wt 142 lb (64.4 kg)  SpO2 97%  BMI 22.03 kg/m2  BP Readings from Last 3 Encounters:   06/22/17 136/68   06/13/17 122/58   05/15/17 122/70     Wt Readings from Last 3 Encounters:   06/22/17 142 lb (64.4 kg)   06/13/17 143 lb 6.4 oz (65 kg)   05/15/17 146 lb 6.4 oz (66.4 kg)      Body mass index is 22.03 kg/(m^2).    CC:  Main reason(s) for today's visit: Hypertension      HPI:     Patient presents today for four-month follow-up for hypertension.  She was last seen by me during May of this year which time her blood pressure was well-controlled.    Hypertension: She is compliant with medication and usually dietary salt restriction, she is active with gardening but does not regularly check blood pressure the outpatient setting.  CMP was done earlier this year in May the results of which are normal and noted below.    Patient complains of vaginal discharge is been worse over the past several weeks, she notes no odor or " blood within the discharge itself.  I have suggested that she follow-up with her gynecologist Dr. Chucho Spears for this issue.    Health maintenance: Patient had her annual wellness visit in January of this year.    She continues to complain cough, this is associated with repeated episodes throat clearing.          Patient Care Team:  Mychal Soliz MD as PCP - General  Mychal Soliz MD as PCP - Family Medicine  Marla Lyons MD as PCP - Claims Attributed  Marla Lyons MD as Consulting Physician (Hematology and Oncology)  Gregor Bagley MD as Referring Physician (Breast Surgery)  ______________________________________________________    REVIEW OF SYSTEMS    Review of Systems   Respiratory: Negative for chest tightness and shortness of breath.    Cardiovascular: Negative for chest pain and palpitations.   Neurological: Negative for dizziness, syncope, speech difficulty, weakness, light-headedness and headaches.           PHYSICAL EXAMINATION    Physical Exam   Constitutional: She is oriented to person, place, and time. She appears well-developed and well-nourished. No distress.   HENT:   [place]   Cardiovascular: Normal rate, regular rhythm, normal heart sounds and intact distal pulses.  Exam reveals no gallop and no friction rub.    No murmur heard.  Pulmonary/Chest: Effort normal and breath sounds normal. She has no wheezes. She has no rales.   Musculoskeletal: She exhibits no edema.   Neurological: She is alert and oriented to person, place, and time.   Skin: Skin is warm and dry. No rash noted.   Psychiatric: She has a normal mood and affect. Her behavior is normal. Judgment and thought content normal.   Nursing note and vitals reviewed.        REVIEWED DATA:    Labs:   Lab Results   Component Value Date     05/15/2017    K 3.6 05/15/2017    AST 21 05/15/2017    ALT 11 05/15/2017    BUN 11 05/15/2017    CREATININE 0.65 05/15/2017    CREATININE 0.70 01/04/2017    CREATININE 0.63 10/31/2016     EGFRIFNONA 87 05/15/2017    EGFRIFAFRI 96 01/04/2017       Lab Results   Component Value Date    GLU 98 01/04/2017    GLU 95 07/15/2015       Lab Results   Component Value Date    LDL 82 01/04/2017    LDL 78 10/18/2016    LDL 91 07/15/2015    HDL 67 (H) 01/04/2017    TRIG 148 01/04/2017       Lab Results   Component Value Date    TSH 3.540 01/04/2017          Lab Results   Component Value Date    WBC 8.62 06/13/2017    HGB 12.8 06/13/2017    HGB 12.1 05/15/2017    HGB 13.4 01/04/2017     06/13/2017        Imaging:        Medical Tests:        Summary of old records / correspondence / consultant report:        Request outside records:        Allergies   Allergen Reactions   • No Known Drug Allergy        Current Outpatient Prescriptions   Medication Sig Dispense Refill   • amitriptyline (ELAVIL) 25 MG tablet Take 1 tablet by mouth Every Night. Half tablet by mouth at bedtime 15 tablet 3   • anastrozole (ARIMIDEX) 1 MG tablet Take 1 tablet by mouth Daily. 90 tablet 0   • atorvastatin (LIPITOR) 10 MG tablet TAKE 1 TABLET BY MOUTH ONE TIME A DAY  30 tablet 3   • Azelastine-Fluticasone 137-50 MCG/ACT suspension 2 sprays into each nostril 2 (Two) Times a Day. 1 bottle 1   • Calcium Carb-Cholecalciferol (CALCIUM 1000 + D) 1000-800 MG-UNIT tablet Take  by mouth.     • escitalopram (LEXAPRO) 20 MG tablet Take 0.5 tablets by mouth Daily. 45 tablet 1   • hydrochlorothiazide (HYDRODIURIL) 25 MG tablet Take 1 tablet by mouth Daily. 7 tablet 0   • levothyroxine (SYNTHROID, LEVOTHROID) 75 MCG tablet Take 1 tablet by mouth Daily. 90 tablet 1   • risedronate (ACTONEL) 35 MG tablet Take 35 mg by mouth Every 7 (Seven) Days.       No current facility-administered medications for this visit.        PFSH:     The following portions of the patient's history were reviewed and updated as appropriate: Allergies / Current Medications / Past Medical History / Surgical History / Social History / Family History    Patient Active Problem  List   Diagnosis   • Breast CA   • Hypothyroidism   • HLD (hyperlipidemia)   • Routine health maintenance   • Imbalance   • Altered bowel habits   • Colon polyp, hyperplastic   • Colon polyps   • Depression   • Diverticulitis of intestine   • Osteoarthritis   • Hearing loss   • Hypertension   • Obstructive sleep apnea syndrome   • Osteoporosis   • Urinary incontinence   • Medicare annual wellness visit, initial   • Encounter for immunization   • Cough   • Peripheral polyneuropathy   • Vaginal discharge       Past Medical History:   Diagnosis Date   • Breast cancer    • Cancer     Left breast cancer, [id number] invasive ductal carcinoma   • Colon polyp, hyperplastic 2016   • Depression    • Disease of thyroid gland    • [place] (hard of hearing)    • Hyperlipidemia    • Hypertension    • Osteopenia    • Radiation    • Sleep apnea        Past Surgical History:   Procedure Laterality Date   • BREAST BIOPSY     • BREAST LUMPECTOMY      left side   • CATARACT EXTRACTION     •  SECTION      x3   • COLONOSCOPY     • COLONOSCOPY N/A 2016    Procedure:  colonoscopy into cecum with saline injection, hot snare polypectomy, APC used in ascending polyp area;  Surgeon: Luke Linton MD;  Location: [facility] ENDOSCOPY;  Service:    • COLONOSCOPY N/A 2016    Procedure: COLONOSCOPY into cecum with polypectomies and methylene blue inj and saline inj. (5 cc.);  Surgeon: Luke Linton MD;  Location: [facility] ENDOSCOPY;  Service:    • COLONOSCOPY W/ BIOPSIES         Social History     Social History   • Marital status:      Spouse name: Gregor   • Number of children: 3   • Years of education: N/A     Occupational History   • Office    •  Retired     Social History Main Topics   • Smoking status: Never Smoker   • Smokeless tobacco: Never Used   • Alcohol use No   • Drug use: No   • Sexual activity: Defer     Other Topics Concern   • None     Social History Narrative           Family History    Problem Relation Age of Onset   • Colon cancer Mother    • Cancer Mother    • Coronary artery disease Father    • Heart attack Father    • Heart disease Father    • No Known Problems Brother          **Stan Disclaimer:   Much of this encounter note is an electronic transcription/translation of spoken language to printed text. The electronic translation of spoken language may permit erroneous, or at times, nonsensical words or phrases to be inadvertently transcribed. Although I have reviewed the note for such errors, some may still exist.

## 2017-06-23 ENCOUNTER — HOSPITAL ENCOUNTER (OUTPATIENT)
Dept: MAMMOGRAPHY | Facility: HOSPITAL | Age: 82
Discharge: HOME | End: 2017-06-23
Admitting: INTERNAL MEDICINE

## 2017-06-23 DIAGNOSIS — Z12.39 BREAST CANCER SCREENING: ICD-10-CM

## 2017-06-23 PROCEDURE — G0202 SCR MAMMO BI INCL CAD: HCPCS

## 2017-06-23 PROCEDURE — 77063 BREAST TOMOSYNTHESIS BI: CPT

## 2017-07-11 ENCOUNTER — HOSPITAL ENCOUNTER (OUTPATIENT)
Dept: BONE DENSITY | Facility: HOSPITAL | Age: 82
Discharge: HOME | End: 2017-07-11
Attending: INTERNAL MEDICINE | Admitting: INTERNAL MEDICINE

## 2017-07-11 DIAGNOSIS — M81.0 OSTEOPOROSIS: ICD-10-CM

## 2017-07-11 DIAGNOSIS — Z79.899 ENCOUNTER FOR LONG-TERM (CURRENT) USE OF HIGH-RISK MEDICATION: ICD-10-CM

## 2017-07-11 PROCEDURE — 77080 DXA BONE DENSITY AXIAL: CPT

## 2017-07-12 DIAGNOSIS — G62.9 PERIPHERAL POLYNEUROPATHY: ICD-10-CM

## 2017-07-12 DIAGNOSIS — E03.9 ADULT HYPOTHYROIDISM: ICD-10-CM

## 2017-07-12 RX ORDER — AMITRIPTYLINE HYDROCHLORIDE 25 MG/1
TABLET, FILM COATED ORAL
Qty: 30 TABLET | Refills: 5 | Status: SHIPPED | OUTPATIENT
Start: 2017-07-12 | End: 2018-02-12 | Stop reason: SDUPTHER

## 2017-07-12 RX ORDER — LEVOTHYROXINE SODIUM 0.07 MG/1
TABLET ORAL
Qty: 90 TABLET | Refills: 1 | Status: SHIPPED | OUTPATIENT
Start: 2017-07-12 | End: 2018-05-27 | Stop reason: SDUPTHER

## 2017-09-15 ENCOUNTER — TELEPHONE (OUTPATIENT)
Dept: ONCOLOGY | Facility: HOSPITAL | Age: 82
End: 2017-09-15

## 2017-09-15 RX ORDER — ANASTROZOLE 1 MG/1
TABLET ORAL
Qty: 90 TABLET | Refills: 0 | Status: SHIPPED | OUTPATIENT
Start: 2017-09-15 | End: 2017-11-21 | Stop reason: SDUPTHER

## 2017-09-15 RX ORDER — ALENDRONATE SODIUM 35 MG/1
TABLET ORAL
Qty: 12 TABLET | Refills: 0 | Status: SHIPPED | OUTPATIENT
Start: 2017-09-15 | End: 2018-02-15 | Stop reason: SDUPTHER

## 2017-09-15 NOTE — TELEPHONE ENCOUNTER
Pt calling for refill of Arimidex. Informed patient it was already sent in this afternoon to [facility]. Pt v/u .

## 2017-11-21 ENCOUNTER — OFFICE VISIT (OUTPATIENT)
Dept: ONCOLOGY | Facility: CLINIC | Age: 82
End: 2017-11-21

## 2017-11-21 ENCOUNTER — LAB (OUTPATIENT)
Dept: LAB | Facility: HOSPITAL | Age: 82
End: 2017-11-21

## 2017-11-21 VITALS
RESPIRATION RATE: 16 BRPM | HEIGHT: 68 IN | DIASTOLIC BLOOD PRESSURE: 74 MMHG | BODY MASS INDEX: 22.1 KG/M2 | WEIGHT: 145.8 LBS | SYSTOLIC BLOOD PRESSURE: 148 MMHG | TEMPERATURE: 97.8 F | HEART RATE: 90 BPM | OXYGEN SATURATION: 100 %

## 2017-11-21 DIAGNOSIS — C50.919 MALIGNANT NEOPLASM OF BREAST IN FEMALE, ESTROGEN RECEPTOR POSITIVE, UNSPECIFIED LATERALITY, UNSPECIFIED SITE OF BREAST (HCC): Primary | ICD-10-CM

## 2017-11-21 DIAGNOSIS — C50.919 MALIGNANT NEOPLASM OF FEMALE BREAST (HCC): ICD-10-CM

## 2017-11-21 DIAGNOSIS — C50.919 MALIGNANT NEOPLASM OF FEMALE BREAST, UNSPECIFIED ESTROGEN RECEPTOR STATUS, UNSPECIFIED LATERALITY, UNSPECIFIED SITE OF BREAST (HCC): Primary | ICD-10-CM

## 2017-11-21 DIAGNOSIS — Z17.0 MALIGNANT NEOPLASM OF BREAST IN FEMALE, ESTROGEN RECEPTOR POSITIVE, UNSPECIFIED LATERALITY, UNSPECIFIED SITE OF BREAST (HCC): Primary | ICD-10-CM

## 2017-11-21 PROBLEM — M85.861 OSTEOPENIA OF BOTH LOWER LEGS: Status: ACTIVE | Noted: 2017-11-21

## 2017-11-21 PROBLEM — M85.862 OSTEOPENIA OF BOTH LOWER LEGS: Status: ACTIVE | Noted: 2017-11-21

## 2017-11-21 LAB
ALBUMIN SERPL-MCNC: 4.2 G/DL (ref 3.5–5.2)
ALBUMIN/GLOB SERPL: 1.1 G/DL (ref 1.1–2.4)
ALP SERPL-CCNC: 98 U/L (ref 38–116)
ALT SERPL W P-5'-P-CCNC: 14 U/L (ref 0–33)
ANION GAP SERPL CALCULATED.3IONS-SCNC: 12.3 MMOL/L
AST SERPL-CCNC: 22 U/L (ref 0–32)
BASOPHILS # BLD AUTO: 0.07 10*3/MM3 (ref 0–0.1)
BASOPHILS NFR BLD AUTO: 1.3 % (ref 0–1.1)
BILIRUB SERPL-MCNC: 0.7 MG/DL (ref 0.1–1.2)
BUN BLD-MCNC: 13 MG/DL (ref 6–20)
BUN/CREAT SERPL: 19.1 (ref 7.3–30)
CALCIUM SPEC-SCNC: 9.7 MG/DL (ref 8.5–10.2)
CHLORIDE SERPL-SCNC: 97 MMOL/L (ref 98–107)
CO2 SERPL-SCNC: 31.7 MMOL/L (ref 22–29)
CREAT BLD-MCNC: 0.68 MG/DL (ref 0.6–1.1)
DEPRECATED RDW RBC AUTO: 48.5 FL (ref 37–49)
EOSINOPHIL # BLD AUTO: 0.18 10*3/MM3 (ref 0–0.36)
EOSINOPHIL NFR BLD AUTO: 3.3 % (ref 1–5)
ERYTHROCYTE [DISTWIDTH] IN BLOOD BY AUTOMATED COUNT: 13.4 % (ref 11.7–14.5)
GFR SERPL CREATININE-BSD FRML MDRD: 82 ML/MIN/1.73
GLOBULIN UR ELPH-MCNC: 3.9 GM/DL (ref 1.8–3.5)
GLUCOSE BLD-MCNC: 61 MG/DL (ref 74–124)
HCT VFR BLD AUTO: 40 % (ref 34–45)
HGB BLD-MCNC: 13.1 G/DL (ref 11.5–14.9)
HOLD SPECIMEN: NORMAL
IMM GRANULOCYTES # BLD: 0.01 10*3/MM3 (ref 0–0.03)
IMM GRANULOCYTES NFR BLD: 0.2 % (ref 0–0.5)
LYMPHOCYTES # BLD AUTO: 1.76 10*3/MM3 (ref 1–3.5)
LYMPHOCYTES NFR BLD AUTO: 32.7 % (ref 20–49)
MCH RBC QN AUTO: 32 PG (ref 27–33)
MCHC RBC AUTO-ENTMCNC: 32.8 G/DL (ref 32–35)
MCV RBC AUTO: 97.8 FL (ref 83–97)
MONOCYTES # BLD AUTO: 0.58 10*3/MM3 (ref 0.25–0.8)
MONOCYTES NFR BLD AUTO: 10.8 % (ref 4–12)
NEUTROPHILS # BLD AUTO: 2.78 10*3/MM3 (ref 1.5–7)
NEUTROPHILS NFR BLD AUTO: 51.7 % (ref 39–75)
NRBC BLD MANUAL-RTO: 0 /100 WBC (ref 0–0)
PLATELET # BLD AUTO: 219 10*3/MM3 (ref 150–375)
PMV BLD AUTO: 10.2 FL (ref 8.9–12.1)
POTASSIUM BLD-SCNC: 3.3 MMOL/L (ref 3.5–4.7)
PROT SERPL-MCNC: 8.1 G/DL (ref 6.3–8)
RBC # BLD AUTO: 4.09 10*6/MM3 (ref 3.9–5)
SODIUM BLD-SCNC: 141 MMOL/L (ref 134–145)
WBC NRBC COR # BLD: 5.38 10*3/MM3 (ref 4–10)

## 2017-11-21 PROCEDURE — 85025 COMPLETE CBC W/AUTO DIFF WBC: CPT | Performed by: INTERNAL MEDICINE

## 2017-11-21 PROCEDURE — 36415 COLL VENOUS BLD VENIPUNCTURE: CPT | Performed by: INTERNAL MEDICINE

## 2017-11-21 PROCEDURE — 80053 COMPREHEN METABOLIC PANEL: CPT | Performed by: INTERNAL MEDICINE

## 2017-11-21 PROCEDURE — 99214 OFFICE O/P EST MOD 30 MIN: CPT | Performed by: INTERNAL MEDICINE

## 2017-11-21 RX ORDER — ANASTROZOLE 1 MG/1
1 TABLET ORAL DAILY
Qty: 90 TABLET | Refills: 0 | Status: SHIPPED | OUTPATIENT
Start: 2017-11-21 | End: 2017-12-12 | Stop reason: SDUPTHER

## 2017-11-21 NOTE — PROGRESS NOTES
Subjective .     REASONS FOR FOLLOWUP:    [id number] invasive ductal carcinoma in the left breast. The mammogram showed the lesion to be 1.3 x 1.7 cm. She had a digital mammogram that showed a 1.8 cm mass in the left breast at 6 o'clock position. The biopsy was consistent wi t h invasive ductal carcinoma, grade 2, [place] score of 6 out of 9, ER positive greater than 90%, MI positive greater than 90%, HER-2/shannon 2+ by immunohistochemistry but negative by FISH. She is status post lumpectomy which showed a 1.3 x 1.7 cm mass, gr a de 2, Kallie score of 6 out of 9 and there was a component of DCIS. A second focus of DCIS was present along the superior margin away from the actual mass and the superior margin was involved. As a result she underwent re-excision and this was negati ve. She had 3 lymph nodes removed and there was macrometastasis in 1 lymph node which was about 0.4 cm. Currently the patient refuses chemotherapy and is going to be started on hormonal treatment with Arimidex.      HISTORY OF PRESENT ILLNESS:  The patient is a 85 y.o. year old female who is here for follow-up with the above-mentioned history.    History of Present Illness     The patient is an elderly female, currently here for followup. She is on Arimidex for her [id number] invasive ductal carcinoma of the left breast. She was seen last on 06/08/2015 with plans of continuing Arimidex. She has some m ild hot flashes related to Arimidex. Otherwise, she is doing very well. She had a mammogram back in May 2017 and that was negative.  Bone density shows improvement in the femur but slight decrease in the lumbar spine.  Patient is taking Actonel and would continue to take it.    PAST MEDICAL HISTORY:   1. Significant for high cholesterol.   2. Hyperthyroidism.   3. Depression.   4. History of hypertension.  5.    PAST SURGICAL HISTORY: Three C-sections.     OB/GYN HISTORY: She started menstrual periods at age 14. She attained menopause around 50.  She is  3, para 3, no miscarriages. She was 23 years old when she had her first child and 29 when she had the last one. Her children are 59, 58 and 54 years old. Two girls and 1 boy.     ONCOLOGIC HISTORY      ONCOLOGIC HISTORY: The patient is an 82-year-old female who likes to be called Tati who has noticed a bump in the left breast. As a result, she went to her primary care physician and mammogram was ordered. On 2014 she underwent mammogram at [facility] and the mammogram showed evidence of a 1.3 x 0.7 x 0.9 cm irregular hypoechoic mass that extends into the skin and in the left breast at the 7 o'clock position 11 cm from the nipple. As a result a biopsy was recommended. The mammogra m on the right was negative. She underwent a bilateral digital mammogram which showed that she had scattered fibroglandular densities throughout both breasts within the posterior one-third of the left breast at the 6 o'clock position . At the inframammary fold there was an irregular mass that measured 1.8 cm in greatest dimension with spiculation and architectural distortion noted. No abnormality was seen on the right breast. Mildly prominent left axillary lymph node was noted. Ultrasound was done which showed that there was a 1.3 x 0.7 x 0.9 cm irregular mass that extended into the skin. As a result, the patient underwent ultrasound-guided biopsy on 2014. The pathology, accession #[id number]. 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 The histologic grade was 2, [place] score was 6 out of 9 and the maximal span of invasive carcinoma in the core was 8 mm. The estrogen receptors were done and was greater than 90%, progestero n e receptor was 90%, HER-2 by immunohistochemistry was 2+ but by FISH HER/2 ratio was 1.4 which is negative. Subsequently, the patient was referred to Dr. Bagley and MRI of the breast was performed  on 05/02/2014 and MRI showed that within the left breas t at 6 o'clock position in the posterior one-third near the inframammary fold there was an irregular enhancing mass with internal metallic clip. There was linear enhancement extending anteriorly away from the mass. The mass itself measured 2 cm in the sup e rior to inferior dimension, 2 cm in anterior to posterior dimension and 1.9 cm in the medial to lateral dimension and extending anteriorly away from the mass and contiguous with the mass is a linear clump enhancement that measured about 2.5 cm in the ant e rior posterior dimension. This brings the greatest dimension of the mass and adjacent near clump enhancement to be a total of 4.4 cm. There were no other areas that were suspicious. The right breast was negative. As a result the patient underwent surger y . She underwent left breast lumpectomy with sentinel lymph node sampling on 05/22/2014. The pathology accession number is [id number]. Pathology is consistent with invasive ductal carcinoma intermediate grade, Kallie score 6, tumor size 1.5 x 1.3 x 1.2 c m. 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 Intermediate nuclear grade minor component margins were focally involved by DCIS of the superior margin. There is a focus of intermediate grade DCIS located approximately 1.3 cm from the invasive carcinoma, which involves the superior margin and distance of invasive carcinoma from the closest margin was 1 mm superiorly and distance of in situ carcinoma from the closest margins was involved superiorly. There were 3 lymph nodes removed. There was micrometast a sis in 1 lymph node and the size of the micrometastasis was 0.4 cm and it is a [id number] invasive ductal carcinoma. There was tumor invasion of dermis present. The patient then had to undergo a second surgery on 06/11/2014 because of positive margin. She u nderwent re-excision of the superior margin of the left breast. The pathology accession number is  [id number] and the patient underwent a left breast lumpectomy re-excision. Focal non-atypical ductal hyperplasia, extensive fat necrosis with fibrosis and mix e d inflammation, no atypia. The patient then was referred here for the evaluation of options of treatment. She has already seen Dr. Pat Sage yesterday with plans of starting radiation on Monday. She has healed up from the surgery. She is here to disc uss options of further treatment from us. Currently she has no complaints except for a cough, which is chronic, which is likely secondary to allergies as the cough gets worse during the allergy season.    Patient has been on Arimidex since 2014 with olive ns to continue a total of 5 years.    Radiation between 2014 to 2014.    The 2015 mammogram is benign. We will continue Arimidex.    2016 mammogram negative.    MEDICATIONS: The current medication list was reviewed with the patient and updates in the EMR this date per the medical assistant. Medication dosages and frequencies were confirmed to be accurate.     ALLERGIES: Not allergic to any medications.     SOCIAL HISTORY: She is  and lives with her . She is very much into gardening and reading. She does not smoke. She does not drink alcohol. She has no history of any previous blood transfusions  .   FAMILY HISTORY: Father  of a heart attack at 69. Mother had colon cancer and  of colon cancer at 76. She has 1 brother who is 77 and in good health. There is no other family history of breast cancer.             Current Outpatient Prescriptions on File Prior to Visit   Medication Sig Dispense Refill   • alendronate (FOSAMAX) 35 MG tablet TAKE 1 TABLET  EVERY 7 DAYS. (SUBSTITUTED FOR FOSAMAX) 12 tablet 0   • amitriptyline (ELAVIL) 25 MG tablet TAKE 1 TABLET BY MOUTH nightly  30 tablet 5   • anastrozole (ARIMIDEX) 1 MG tablet TAKE 1 TABLET EVERY DAY 90 tablet 0   • atorvastatin (LIPITOR) 10 MG tablet TAKE 1 TABLET BY  "MOUTH ONE TIME A DAY  30 tablet 3   • Azelastine-Fluticasone 137-50 MCG/ACT suspension 2 sprays into each nostril 2 (Two) Times a Day. 1 bottle 1   • Calcium Carb-Cholecalciferol (CALCIUM 1000 + D) 1000-800 MG-UNIT tablet Take  by mouth.     • escitalopram (LEXAPRO) 20 MG tablet Take 0.5 tablets by mouth Daily. 45 tablet 1   • hydrochlorothiazide (HYDRODIURIL) 25 MG tablet Take 1 tablet by mouth Daily. 7 tablet 0   • levothyroxine (SYNTHROID, LEVOTHROID) 75 MCG tablet TAKE 1 TABLET EVERY DAY 90 tablet 1   • risedronate (ACTONEL) 35 MG tablet Take 35 mg by mouth Every 7 (Seven) Days.     • [DISCONTINUED] amitriptyline (ELAVIL) 25 MG tablet Take 1 tablet by mouth Every Night. Half tablet by mouth at bedtime 15 tablet 3     No current facility-administered medications on file prior to visit.          Review of Systems  A comprehensive 14 point review of systems was performed and was negative except as mentioned.    Objective      Vitals:    11/21/17 0943   BP: 148/74   Pulse: 90   Resp: 16   Temp: 97.8 °F (36.6 °C)   TempSrc: Oral   SpO2: 100%   Weight: 145 lb 12.8 oz (66.1 kg)   Height: 68.15\" (173.1 cm)  Comment: new ht w/shoes   PainSc: 0-No pain     Current Status 11/21/2017   ECOG score 0       Physical Exam     GENERAL:  Well-developed, well-nourished in no acute distress.   SKIN:  Warm, dry without rashes, purpura or petechiae.  HEAD:  Normocephalic.  EYES:  Pupils equal, round and reactive to light.  EOMs intact.  Conjunctivae normal.  EARS:  Hearing intact.  NOSE:  Septum midline.  No excoriations or nasal discharge.  MOUTH:  Tongue is well-papillated; no stomatitis or ulcers.  Lips normal.  THROAT:  Oropharynx without lesions or exudates.  NECK:  Supple with good range of motion; no thyromegaly or masses, no JVD.  LYMPHATICS:  No cervical, supraclavicular, axillary or inguinal adenopathy.  CHEST:  Lungs clear to percussion and auscultation. Good airflow.  CARDIAC:  Regular rate and rhythm without murmurs, " rubs or gallops. Normal S1,S2.  ABDOMEN:  Soft, nontender with no organomegaly or masses.  BREASTS: Right and left breast: no breast masses, no axillary adenopthy, no nipple discharge. No supraclavicular adenopathy.  No skin changes, no nipple discharge  EXTREMITIES:  No clubbing, cyanosis or edema.  NEUROLOGICAL:  Cranial Nerves II-XII grossly intact.  No focal neurological deficits.  PSYCHIATRIC:  Normal affect and mood.          RECENT LABS:  Hematology WBC   Date Value Ref Range Status   11/21/2017 5.38 4.00 - 10.00 10*3/mm3 Final     RBC   Date Value Ref Range Status   11/21/2017 4.09 3.90 - 5.00 10*6/mm3 Final     Hemoglobin   Date Value Ref Range Status   11/21/2017 13.1 11.5 - 14.9 g/dL Final     Hematocrit   Date Value Ref Range Status   11/21/2017 40.0 34.0 - 45.0 % Final     Platelets   Date Value Ref Range Status   11/21/2017 219 150 - 375 10*3/mm3 Final        Assessment/Plan      1. This is a patient with history of T1N1MX invasive ductal carcinoma of the left breast status post surgery, status post radiation, currently she is tolerating Arimidex.      2. Mild hot flashes related to Arimidex.      3. Osteopenia for which she is taking calcium, vitamin D and Actonel.      4. Our plan is to continue Arimidex 1 mg daily. She is 3 years into treatment.     5. We will plan to bring her back in 6 months with CBC, CMP, LDH and we will get a bone density prior to that.    6. Fu with survivorship clinic , will schedule at next appointment.  7. Need to consider Prolia in future if the next bone density shows any worsening.        MD Yudi Gan William P., MD

## 2017-11-23 RX ORDER — ATORVASTATIN CALCIUM 10 MG/1
TABLET, FILM COATED ORAL
Qty: 30 TABLET | Refills: 2 | Status: CANCELLED | OUTPATIENT
Start: 2017-11-23

## 2017-11-27 ENCOUNTER — TELEPHONE (OUTPATIENT)
Dept: INTERNAL MEDICINE | Age: 82
End: 2017-11-27

## 2017-11-27 DIAGNOSIS — E78.5 HYPERLIPIDEMIA, UNSPECIFIED HYPERLIPIDEMIA TYPE: Primary | ICD-10-CM

## 2017-11-27 DIAGNOSIS — R03.0 ELEVATED BLOOD PRESSURE READING WITHOUT DIAGNOSIS OF HYPERTENSION: ICD-10-CM

## 2017-11-27 DIAGNOSIS — E78.5 HYPERLIPIDEMIA, UNSPECIFIED HYPERLIPIDEMIA TYPE: ICD-10-CM

## 2017-11-27 RX ORDER — HYDROCHLOROTHIAZIDE 25 MG/1
25 TABLET ORAL DAILY
Qty: 90 TABLET | Refills: 1 | Status: SHIPPED | OUTPATIENT
Start: 2017-11-27 | End: 2017-11-27 | Stop reason: SDUPTHER

## 2017-11-27 RX ORDER — HYDROCHLOROTHIAZIDE 25 MG/1
25 TABLET ORAL DAILY
Qty: 90 TABLET | Refills: 1 | Status: SHIPPED | OUTPATIENT
Start: 2017-11-27 | End: 2018-05-27 | Stop reason: SDUPTHER

## 2017-11-27 RX ORDER — ATORVASTATIN CALCIUM 10 MG/1
10 TABLET, FILM COATED ORAL DAILY
Qty: 90 TABLET | Refills: 1 | Status: SHIPPED | OUTPATIENT
Start: 2017-11-27 | End: 2017-11-27 | Stop reason: SDUPTHER

## 2017-11-27 RX ORDER — ATORVASTATIN CALCIUM 10 MG/1
10 TABLET, FILM COATED ORAL DAILY
Qty: 90 TABLET | Refills: 1 | Status: SHIPPED | OUTPATIENT
Start: 2017-11-27 | End: 2018-04-21 | Stop reason: SDUPTHER

## 2017-12-13 RX ORDER — ANASTROZOLE 1 MG/1
TABLET ORAL
Qty: 90 TABLET | Refills: 1 | Status: SHIPPED | OUTPATIENT
Start: 2017-12-13 | End: 2018-06-28 | Stop reason: SDUPTHER

## 2017-12-29 ENCOUNTER — OFFICE VISIT (OUTPATIENT)
Dept: INTERNAL MEDICINE | Age: 82
End: 2017-12-29

## 2017-12-29 ENCOUNTER — HOSPITAL ENCOUNTER (OUTPATIENT)
Dept: GENERAL RADIOLOGY | Facility: HOSPITAL | Age: 82
Discharge: HOME | End: 2017-12-29
Admitting: INTERNAL MEDICINE

## 2017-12-29 VITALS
TEMPERATURE: 97.8 F | WEIGHT: 143.6 LBS | DIASTOLIC BLOOD PRESSURE: 70 MMHG | OXYGEN SATURATION: 97 % | SYSTOLIC BLOOD PRESSURE: 130 MMHG | BODY MASS INDEX: 23.93 KG/M2 | HEIGHT: 65 IN | HEART RATE: 86 BPM

## 2017-12-29 DIAGNOSIS — R05.9 COUGH: ICD-10-CM

## 2017-12-29 DIAGNOSIS — E87.6 HYPOKALEMIA: ICD-10-CM

## 2017-12-29 DIAGNOSIS — I10 ESSENTIAL HYPERTENSION: Primary | ICD-10-CM

## 2017-12-29 PROBLEM — D25.0 FIBROIDS, SUBMUCOSAL: Status: ACTIVE | Noted: 2017-08-02

## 2017-12-29 LAB — POTASSIUM SERPL-SCNC: 3.8 MMOL/L (ref 3.5–5.2)

## 2017-12-29 PROCEDURE — 71020 HC CHEST PA AND LATERAL: CPT

## 2017-12-29 PROCEDURE — 99214 OFFICE O/P EST MOD 30 MIN: CPT | Performed by: INTERNAL MEDICINE

## 2017-12-29 NOTE — PROGRESS NOTES
"Angela HICKS Tatter / 86 y.o. / female  12/29/2017  VITALS    Visit Vitals   • /70   • Pulse 86   • Temp 97.8 °F (36.6 °C)   • Ht 165.1 cm (65\")   • Wt 65.1 kg (143 lb 9.6 oz)   • SpO2 97%   • BMI 23.9 kg/m2       BP Readings from Last 3 Encounters:   12/29/17 130/70   11/21/17 148/74   11/18/17 141/84     Wt Readings from Last 3 Encounters:   12/29/17 65.1 kg (143 lb 9.6 oz)   11/21/17 66.1 kg (145 lb 12.8 oz)   11/18/17 65.8 kg (145 lb)      Body mass index is 23.9 kg/(m^2).    CC:  Main reason(s) for today's visit: Hypertension (6 month follow up)      HPI:     Presents today for follow-up of hypertension.  When last seen by me, blood pressure was well-controlled.  Today she is compliant with medication, dietary salt restriction, and does not monitor blood pressure the outpatient setting.    Cough the past 6 months , it has scant amount of sputum produced, no hemoptysis noted..  She denied postnasal drainage he will rarely have some wheezing which resolves with coughing.  There is no heartburn noted; at home she has tried cough suppressant over-the-counter.  Last chest x-ray August 2014 negative.  She does describe an irritation in the back of her throat    Laboratory November 2017, CBC CMP done see below for specifics of note potassium was 3.3 at that time.    Patient Care Team:  Mychal Soliz MD as PCP - General  Mychal Soliz MD as PCP - Family Medicine  Marla Lyons MD as PCP - Claims Attributed  Marla Lyons MD as Consulting Physician (Hematology and Oncology)  Gregor Bagley MD as Referring Physician (Breast Surgery)  ____________________________________________________________________    ASSESSMENT & PLAN:    Problem List Items Addressed This Visit        Unprioritized    Hypertension - Primary    Relevant Medications    hydrochlorothiazide (HYDRODIURIL) 25 MG tablet    Cough    Relevant Medications    Azelastine-Fluticasone 137-50 MCG/ACT suspension      Other Visit Diagnoses     " Hypokalemia            No orders of the defined types were placed in this encounter.      Summary/Discussion:  · Number-one hypertension stable on current medical regimen.  Plan: Same meds, blood pressure check 6 months.  ·   · #2 cough, check chest x-ray today, advised Allegra daily.  Follow-up 1 month if not improved.  ·   · #3 hypokalemia recheck potassium today.    No Follow-up on file.    Future Appointments  Date Time Provider Department Center   5/8/2018 11:00 AM LAB CHAIR 2 [facility]  LAB [facility]   5/8/2018 11:40 AM MD FLOR GanK CBC [facility]  CBC Yi       ______________________________________________________    REVIEW OF SYSTEMS    Review of Systems   Respiratory: Negative for chest tightness and shortness of breath.    Cardiovascular: Negative for chest pain and palpitations.   Neurological: Negative for dizziness, syncope, speech difficulty, weakness, light-headedness and headaches.         PHYSICAL EXAMINATION    Physical Exam   Constitutional: She is oriented to person, place, and time. She appears well-developed and well-nourished. No distress.   HENT:   Mouth/Throat: No posterior oropharyngeal edema or posterior oropharyngeal erythema.   Cardiovascular: Normal rate, regular rhythm, normal heart sounds and intact distal pulses.  Exam reveals no gallop and no friction rub.    No murmur heard.  Pulmonary/Chest: Effort normal and breath sounds normal. She has no wheezes. She has no rales.   Musculoskeletal: She exhibits no edema.   Neurological: She is alert and oriented to person, place, and time.   Skin: Skin is warm and dry. No rash noted.   Psychiatric: She has a normal mood and affect. Her behavior is normal. Judgment and thought content normal.   Nursing note and vitals reviewed.      REVIEWED DATA:    Labs:     Lab Results   Component Value Date     11/21/2017    K 3.3 (L) 11/21/2017    AST 22 11/21/2017    ALT 14 11/21/2017    BUN 13 11/21/2017    CREATININE 0.68 11/21/2017     CREATININE 0.65 05/15/2017    CREATININE 0.70 01/04/2017    EGFRIFNONA 82 11/21/2017    EGFRIFAFRI 96 01/04/2017       Lab Results   Component Value Date    GLU 98 01/04/2017    GLU 95 07/15/2015       Lab Results   Component Value Date    LDL 82 01/04/2017    LDL 78 10/18/2016    LDL 91 07/15/2015    HDL 67 (H) 01/04/2017    TRIG 148 01/04/2017       Lab Results   Component Value Date    TSH 3.540 01/04/2017       Lab Results   Component Value Date    WBC 5.38 11/21/2017    HGB 13.1 11/21/2017    HGB 12.8 06/13/2017    HGB 12.1 05/15/2017     11/21/2017       Imaging:         Medical Tests:         Summary of old records / correspondence / consultant report:         Request outside records:         ALLERGIES  Allergies   Allergen Reactions   • No Known Drug Allergy         MEDICATIONS  Current Outpatient Prescriptions   Medication Sig Dispense Refill   • amitriptyline (ELAVIL) 25 MG tablet TAKE 1 TABLET BY MOUTH nightly  30 tablet 5   • anastrozole (ARIMIDEX) 1 MG tablet TAKE 1 TABLET EVERY DAY 90 tablet 1   • atorvastatin (LIPITOR) 10 MG tablet Take 1 tablet by mouth Daily. 90 tablet 1   • Azelastine-Fluticasone 137-50 MCG/ACT suspension 2 sprays into each nostril 2 (Two) Times a Day. 1 bottle 1   • Calcium Carb-Cholecalciferol (CALCIUM 1000 + D) 1000-800 MG-UNIT tablet Take  by mouth.     • escitalopram (LEXAPRO) 20 MG tablet Take 0.5 tablets by mouth Daily. 45 tablet 1   • hydrochlorothiazide (HYDRODIURIL) 25 MG tablet Take 1 tablet by mouth Daily. 90 tablet 1   • levothyroxine (SYNTHROID, LEVOTHROID) 75 MCG tablet TAKE 1 TABLET EVERY DAY 90 tablet 1   • risedronate (ACTONEL) 35 MG tablet Take 35 mg by mouth Every 7 (Seven) Days.     • alendronate (FOSAMAX) 35 MG tablet TAKE 1 TABLET  EVERY 7 DAYS. (SUBSTITUTED FOR FOSAMAX) 12 tablet 0     No current facility-administered medications for this visit.        PFSH:     The following portions of the patient's history were reviewed and updated as appropriate:  Allergies / Current Medications / Past Medical History / Surgical History / Social History / Family History    PROBLEM LIST   Patient Active Problem List   Diagnosis   • Breast CA   • Hypothyroidism   • HLD (hyperlipidemia)   • Routine health maintenance   • Imbalance   • Altered bowel habits   • Colon polyp, hyperplastic   • Colon polyps   • Depression   • Diverticulitis of intestine   • Osteoarthritis   • Hearing loss   • Hypertension   • Obstructive sleep apnea syndrome   • Osteoporosis   • Urinary incontinence   • Medicare annual wellness visit, initial   • Encounter for immunization   • Cough   • Peripheral polyneuropathy   • Vaginal discharge   • Osteopenia of both lower legs   • Fibroids, submucosal       PAST MEDICAL HISTORY  Past Medical History:   Diagnosis Date   • Breast cancer    • Cancer     Left breast cancer, [id number] invasive ductal carcinoma   • Colon polyp, hyperplastic 2016   • Depression    • Disease of thyroid gland    • [place] (hard of hearing)    • Hyperlipidemia    • Hypertension    • Osteopenia    • Radiation    • Sleep apnea        SURGICAL HISTORY  Past Surgical History:   Procedure Laterality Date   • BREAST BIOPSY     • BREAST LUMPECTOMY      left side   • CATARACT EXTRACTION     •  SECTION      x3   • COLONOSCOPY     • COLONOSCOPY N/A 2016    Procedure:  colonoscopy into cecum with saline injection, hot snare polypectomy, APC used in ascending polyp area;  Surgeon: Luke Linton MD;  Location: [facility] ENDOSCOPY;  Service:    • COLONOSCOPY N/A 2016    Procedure: COLONOSCOPY into cecum with polypectomies and methylene blue inj and saline inj. (5 cc.);  Surgeon: Luke Linton MD;  Location: [facility] ENDOSCOPY;  Service:    • COLONOSCOPY W/ BIOPSIES     • MYOMECTOMY  2017    Partial       SOCIAL HISTORY  Social History     Social History   • Marital status:      Spouse name: Gregor   • Number of children: 3   • Years of education: N/A      Occupational History   • Office    •  Retired     Social History Main Topics   • Smoking status: Never Smoker   • Smokeless tobacco: Never Used   • Alcohol use No   • Drug use: No   • Sexual activity: Defer     Other Topics Concern   • None     Social History Narrative           FAMILY HISTORY  Family History   Problem Relation Age of Onset   • Colon cancer Mother    • Cancer Mother    • Coronary artery disease Father    • Heart attack Father    • Heart disease Father    • No Known Problems Brother          **Dragon Disclaimer:   Much of this encounter note is an electronic transcription/translation of spoken language to printed text. The electronic translation of spoken language may permit erroneous, or at times, nonsensical words or phrases to be inadvertently transcribed. Although I have reviewed the note for such errors, some may still exist.

## 2018-02-12 DIAGNOSIS — G62.9 PERIPHERAL POLYNEUROPATHY: ICD-10-CM

## 2018-02-12 RX ORDER — AMITRIPTYLINE HYDROCHLORIDE 25 MG/1
TABLET, FILM COATED ORAL
Qty: 30 TABLET | Refills: 4 | Status: SHIPPED | OUTPATIENT
Start: 2018-02-12 | End: 2018-08-28 | Stop reason: SDUPTHER

## 2018-02-16 RX ORDER — ALENDRONATE SODIUM 35 MG/1
TABLET ORAL
Qty: 12 TABLET | Refills: 0 | Status: SHIPPED | OUTPATIENT
Start: 2018-02-16 | End: 2018-06-29 | Stop reason: SDUPTHER

## 2018-04-21 DIAGNOSIS — E78.5 HYPERLIPIDEMIA, UNSPECIFIED HYPERLIPIDEMIA TYPE: ICD-10-CM

## 2018-04-23 RX ORDER — ATORVASTATIN CALCIUM 10 MG/1
TABLET, FILM COATED ORAL
Qty: 90 TABLET | Refills: 1 | Status: SHIPPED | OUTPATIENT
Start: 2018-04-23 | End: 2019-04-23 | Stop reason: SDUPTHER

## 2018-05-08 ENCOUNTER — OFFICE VISIT (OUTPATIENT)
Dept: ONCOLOGY | Facility: CLINIC | Age: 83
End: 2018-05-08

## 2018-05-08 ENCOUNTER — LAB (OUTPATIENT)
Dept: LAB | Facility: HOSPITAL | Age: 83
End: 2018-05-08

## 2018-05-08 VITALS
SYSTOLIC BLOOD PRESSURE: 116 MMHG | RESPIRATION RATE: 16 BRPM | WEIGHT: 149.8 LBS | HEART RATE: 88 BPM | TEMPERATURE: 98.8 F | OXYGEN SATURATION: 94 % | HEIGHT: 68 IN | DIASTOLIC BLOOD PRESSURE: 68 MMHG | BODY MASS INDEX: 22.7 KG/M2

## 2018-05-08 DIAGNOSIS — C50.919 MALIGNANT NEOPLASM OF BREAST IN FEMALE, ESTROGEN RECEPTOR POSITIVE, UNSPECIFIED LATERALITY, UNSPECIFIED SITE OF BREAST (HCC): ICD-10-CM

## 2018-05-08 DIAGNOSIS — C50.919 MALIGNANT NEOPLASM OF FEMALE BREAST, UNSPECIFIED ESTROGEN RECEPTOR STATUS, UNSPECIFIED LATERALITY, UNSPECIFIED SITE OF BREAST (HCC): Primary | ICD-10-CM

## 2018-05-08 DIAGNOSIS — Z17.0 MALIGNANT NEOPLASM OF BREAST IN FEMALE, ESTROGEN RECEPTOR POSITIVE, UNSPECIFIED LATERALITY, UNSPECIFIED SITE OF BREAST (HCC): ICD-10-CM

## 2018-05-08 LAB
ALBUMIN SERPL-MCNC: 4.3 G/DL (ref 3.5–5.2)
ALBUMIN/GLOB SERPL: 1.3 G/DL (ref 1.1–2.4)
ALP SERPL-CCNC: 85 U/L (ref 38–116)
ALT SERPL W P-5'-P-CCNC: 12 U/L (ref 0–33)
ANION GAP SERPL CALCULATED.3IONS-SCNC: 10 MMOL/L
AST SERPL-CCNC: 23 U/L (ref 0–32)
BASOPHILS # BLD AUTO: 0.08 10*3/MM3 (ref 0–0.1)
BASOPHILS NFR BLD AUTO: 1.6 % (ref 0–1.1)
BILIRUB SERPL-MCNC: 0.8 MG/DL (ref 0.1–1.2)
BUN BLD-MCNC: 18 MG/DL (ref 6–20)
BUN/CREAT SERPL: 23.1 (ref 7.3–30)
CALCIUM SPEC-SCNC: 9.6 MG/DL (ref 8.5–10.2)
CHLORIDE SERPL-SCNC: 98 MMOL/L (ref 98–107)
CO2 SERPL-SCNC: 34 MMOL/L (ref 22–29)
CREAT BLD-MCNC: 0.78 MG/DL (ref 0.6–1.1)
DEPRECATED RDW RBC AUTO: 45.5 FL (ref 37–49)
EOSINOPHIL # BLD AUTO: 0.12 10*3/MM3 (ref 0–0.36)
EOSINOPHIL NFR BLD AUTO: 2.4 % (ref 1–5)
ERYTHROCYTE [DISTWIDTH] IN BLOOD BY AUTOMATED COUNT: 12.8 % (ref 11.7–14.5)
GFR SERPL CREATININE-BSD FRML MDRD: 70 ML/MIN/1.73
GLOBULIN UR ELPH-MCNC: 3.3 GM/DL (ref 1.8–3.5)
GLUCOSE BLD-MCNC: 103 MG/DL (ref 74–124)
HCT VFR BLD AUTO: 39.1 % (ref 34–45)
HGB BLD-MCNC: 12.8 G/DL (ref 11.5–14.9)
IMM GRANULOCYTES # BLD: 0.01 10*3/MM3 (ref 0–0.03)
IMM GRANULOCYTES NFR BLD: 0.2 % (ref 0–0.5)
LYMPHOCYTES # BLD AUTO: 1.4 10*3/MM3 (ref 1–3.5)
LYMPHOCYTES NFR BLD AUTO: 27.8 % (ref 20–49)
MCH RBC QN AUTO: 31.4 PG (ref 27–33)
MCHC RBC AUTO-ENTMCNC: 32.7 G/DL (ref 32–35)
MCV RBC AUTO: 95.8 FL (ref 83–97)
MONOCYTES # BLD AUTO: 0.52 10*3/MM3 (ref 0.25–0.8)
MONOCYTES NFR BLD AUTO: 10.3 % (ref 4–12)
NEUTROPHILS # BLD AUTO: 2.91 10*3/MM3 (ref 1.5–7)
NEUTROPHILS NFR BLD AUTO: 57.7 % (ref 39–75)
NRBC BLD MANUAL-RTO: 0 /100 WBC (ref 0–0)
PLATELET # BLD AUTO: 225 10*3/MM3 (ref 150–375)
PMV BLD AUTO: 10.1 FL (ref 8.9–12.1)
POTASSIUM BLD-SCNC: 3.5 MMOL/L (ref 3.5–4.7)
PROT SERPL-MCNC: 7.6 G/DL (ref 6.3–8)
RBC # BLD AUTO: 4.08 10*6/MM3 (ref 3.9–5)
SODIUM BLD-SCNC: 142 MMOL/L (ref 134–145)
WBC NRBC COR # BLD: 5.04 10*3/MM3 (ref 4–10)

## 2018-05-08 PROCEDURE — 36415 COLL VENOUS BLD VENIPUNCTURE: CPT | Performed by: INTERNAL MEDICINE

## 2018-05-08 PROCEDURE — 80053 COMPREHEN METABOLIC PANEL: CPT

## 2018-05-08 PROCEDURE — 99213 OFFICE O/P EST LOW 20 MIN: CPT | Performed by: INTERNAL MEDICINE

## 2018-05-08 PROCEDURE — 85025 COMPLETE CBC W/AUTO DIFF WBC: CPT

## 2018-05-08 RX ORDER — CHLORHEXIDINE GLUCONATE 0.12 MG/ML
RINSE ORAL
COMMUNITY
Start: 2018-04-30 | End: 2021-03-02

## 2018-05-08 NOTE — PROGRESS NOTES
Subjective .     REASONS FOR FOLLOWUP:    [id number] invasive ductal carcinoma in the left breast. The mammogram showed the lesion to be 1.3 x 1.7 cm. She had a digital mammogram that showed a 1.8 cm mass in the left breast at 6 o'clock position. The biopsy was consistent wi t h invasive ductal carcinoma, grade 2, Stevenson score of 6 out of 9, ER positive greater than 90%, WA positive greater than 90%, HER-2/shannon 2+ by immunohistochemistry but negative by FISH. She is status post lumpectomy which showed a 1.3 x 1.7 cm mass, gr a de 2, Kallie score of 6 out of 9 and there was a component of DCIS. A second focus of DCIS was present along the superior margin away from the actual mass and the superior margin was involved. As a result she underwent re-excision and this was negati ve. She had 3 lymph nodes removed and there was macrometastasis in 1 lymph node which was about 0.4 cm. Currently the patient refuses chemotherapy and is going to be started on hormonal treatment with Arimidex.      HISTORY OF PRESENT ILLNESS:  The patient is a 86 y.o. year old female who is here for follow-up with the above-mentioned history.    History of Present Illness     The patient is an elderly female, currently here for followup. She is on Arimidex for her [id number] invasive ductal carcinoma of the left breast. She was seen last on 06/08/2015 with plans of continuing Arimidex. She has some m ild hot flashes related to Arimidex. Otherwise, she is doing very well. She had a mammogram back in May 2017 and that was negative.  Bone density shows improvement in the femur but slight decrease in the lumbar spine.  Patient is taking Actonel and would continue to take it.    He shouldn't is due for mammogram in June and 2018 and it is already scheduled.    PAST MEDICAL HISTORY:   1. Significant for high cholesterol.   2. Hyperthyroidism.   3. Depression.   4. History of hypertension.  5.    PAST SURGICAL HISTORY: Three C-sections.     OB/GYN  HISTORY: She started menstrual periods at age 14. She attained menopause around 50. She is  3, para 3, no miscarriages. She was 23 years old when she had her first child and 29 when she had the last one. Her children are 59, 58 and 54 years old. Two girls and 1 boy.     ONCOLOGIC HISTORY      ONCOLOGIC HISTORY: The patient is an 82-year-old female who likes to be called Tati who has noticed a bump in the left breast. As a result, she went to her primary care physician and mammogram was ordered. On 2014 she underwent mammogram at [facility] and the mammogram showed evidence of a 1.3 x 0.7 x 0.9 cm irregular hypoechoic mass that extends into the skin and in the left breast at the 7 o'clock position 11 cm from the nipple. As a result a biopsy was recommended. The mammogra m on the right was negative. She underwent a bilateral digital mammogram which showed that she had scattered fibroglandular densities throughout both breasts within the posterior one-third of the left breast at the 6 o'clock position . At the inframammary fold there was an irregular mass that measured 1.8 cm in greatest dimension with spiculation and architectural distortion noted. No abnormality was seen on the right breast. Mildly prominent left axillary lymph node was noted. Ultrasound was done which showed that there was a 1.3 x 0.7 x 0.9 cm irregular mass that extended into the skin. As a result, the patient underwent ultrasound-guided biopsy on 2014. The pathology, accession #[id number]. 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 The histologic grade was 2, Kallie score was 6 out of 9 and the maximal span of invasive carcinoma in the core was 8 mm. The estrogen receptors were done and was greater than 90%, progestero n e receptor was 90%, HER-2 by immunohistochemistry was 2+ but by FISH HER/2 ratio was 1.4 which is negative.  Subsequently, the patient was referred to Dr. Bagley and MRI of the breast was performed on 05/02/2014 and MRI showed that within the left breas t at 6 o'clock position in the posterior one-third near the inframammary fold there was an irregular enhancing mass with internal metallic clip. There was linear enhancement extending anteriorly away from the mass. The mass itself measured 2 cm in the sup e rior to inferior dimension, 2 cm in anterior to posterior dimension and 1.9 cm in the medial to lateral dimension and extending anteriorly away from the mass and contiguous with the mass is a linear clump enhancement that measured about 2.5 cm in the ant e rior posterior dimension. This brings the greatest dimension of the mass and adjacent near clump enhancement to be a total of 4.4 cm. There were no other areas that were suspicious. The right breast was negative. As a result the patient underwent surger y . She underwent left breast lumpectomy with sentinel lymph node sampling on 05/22/2014. The pathology accession number is [id number]. Pathology is consistent with invasive ductal carcinoma intermediate grade, Kallie score 6, tumor size 1.5 x 1.3 x 1.2 c m. 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 Intermediate nuclear grade minor component margins were focally involved by DCIS of the superior margin. There is a focus of intermediate grade DCIS located approximately 1.3 cm from the invasive carcinoma, which involves the superior margin and distance of invasive carcinoma from the closest margin was 1 mm superiorly and distance of in situ carcinoma from the closest margins was involved superiorly. There were 3 lymph nodes removed. There was micrometast a sis in 1 lymph node and the size of the micrometastasis was 0.4 cm and it is a [id number] invasive ductal carcinoma. There was tumor invasion of dermis present. The patient then had to undergo a second surgery on 06/11/2014 because of positive margin. She u nderwent  re-excision of the superior margin of the left breast. The pathology accession number is [id number] and the patient underwent a left breast lumpectomy re-excision. Focal non-atypical ductal hyperplasia, extensive fat necrosis with fibrosis and mix e d inflammation, no atypia. The patient then was referred here for the evaluation of options of treatment. She has already seen Dr. Pat Sage yesterday with plans of starting radiation on Monday. She has healed up from the surgery. She is here to disc uss options of further treatment from us. Currently she has no complaints except for a cough, which is chronic, which is likely secondary to allergies as the cough gets worse during the allergy season.    Patient has been on Arimidex since 2014 with olive ns to continue a total of 5 years.    Radiation between 2014 to 2014.    The 2015 mammogram is benign. We will continue Arimidex.    2016 mammogram negative.    MEDICATIONS: The current medication list was reviewed with the patient and updates in the EMR this date per the medical assistant. Medication dosages and frequencies were confirmed to be accurate.     ALLERGIES: Not allergic to any medications.     SOCIAL HISTORY: She is  and lives with her . She is very much into gardening and reading. She does not smoke. She does not drink alcohol. She has no history of any previous blood transfusions  .   FAMILY HISTORY: Father  of a heart attack at 69. Mother had colon cancer and  of colon cancer at 76. She has 1 brother who is 77 and in good health. There is no other family history of breast cancer.             Current Outpatient Prescriptions on File Prior to Visit   Medication Sig Dispense Refill   • alendronate (FOSAMAX) 35 MG tablet TAKE 1 TABLET EVERY 7 DAYS (SUBSTITUTED FOR FOSAMAX) 12 tablet 0   • amitriptyline (ELAVIL) 25 MG tablet TAKE 1 TABLET BY MOUTH AT BEDTIME  30 tablet 4   • anastrozole (ARIMIDEX) 1 MG tablet TAKE 1  "TABLET EVERY DAY 90 tablet 1   • atorvastatin (LIPITOR) 10 MG tablet TAKE 1 TABLET EVERY DAY 90 tablet 1   • Azelastine-Fluticasone 137-50 MCG/ACT suspension 2 sprays into each nostril 2 (Two) Times a Day. 1 bottle 1   • Calcium Carb-Cholecalciferol (CALCIUM 1000 + D) 1000-800 MG-UNIT tablet Take  by mouth.     • escitalopram (LEXAPRO) 20 MG tablet Take 0.5 tablets by mouth Daily. 45 tablet 1   • hydrochlorothiazide (HYDRODIURIL) 25 MG tablet Take 1 tablet by mouth Daily. 90 tablet 1   • levothyroxine (SYNTHROID, LEVOTHROID) 75 MCG tablet TAKE 1 TABLET EVERY DAY 90 tablet 1     No current facility-administered medications on file prior to visit.          Review of Systems  A comprehensive 14 point review of systems was performed and was negative except as mentioned.    Objective      Vitals:    05/08/18 1142   BP: 116/68   Pulse: 88   Resp: 16   Temp: 98.8 °F (37.1 °C)   TempSrc: Oral   SpO2: 94%   Weight: 67.9 kg (149 lb 12.8 oz)   Height: 172 cm (67.72\")   PainSc: 0-No pain     Current Status 5/8/2018   ECOG score 0       Physical Exam     GENERAL:  Well-developed, well-nourished in no acute distress.   NECK:  Supple with good range of motion; no thyromegaly or masses, no JVD.  LYMPHATICS:  No cervical, supraclavicular, axillary or inguinal adenopathy.  CHEST:  Lungs clear to percussion and auscultation. Good airflow.  CARDIAC:  Regular rate and rhythm without murmurs, rubs or gallops. Normal S1,S2.  ABDOMEN:  Soft, nontender with no organomegaly or masses.  BREASTS: Right and left breast: no breast masses, no axillary adenopthy, no nipple discharge. No supraclavicular adenopathy.  No skin changes, no nipple discharge  EXTREMITIES:  No clubbing, cyanosis or edema.  NEUROLOGICAL:  Cranial Nerves II-XII grossly intact.  No focal neurological deficits.  PSYCHIATRIC:  Normal affect and mood.          RECENT LABS:  Hematology WBC   Date Value Ref Range Status   05/08/2018 5.04 4.00 - 10.00 10*3/mm3 Final     RBC   Date " Value Ref Range Status   05/08/2018 4.08 3.90 - 5.00 10*6/mm3 Final     Hemoglobin   Date Value Ref Range Status   05/08/2018 12.8 11.5 - 14.9 g/dL Final     Hematocrit   Date Value Ref Range Status   05/08/2018 39.1 34.0 - 45.0 % Final     Platelets   Date Value Ref Range Status   05/08/2018 225 150 - 375 10*3/mm3 Final        Assessment/Plan      1. This is a patient with history of T1N1MX invasive ductal carcinoma of the left breast status post surgery, status post radiation, currently she is tolerating Arimidex.      2. Mild hot flashes related to Arimidex.      3. Osteopenia for which she is taking calcium, vitamin D and Actonel.      4. Our plan is to continue Arimidex 1 mg daily. She is 3 years into treatment.     5. We will plan to bring her back in 6 months with CBC, CMP, LDH and we will get a bone density prior to that.    6. Fu with survivorship clinic , will schedule at next appointment.  7. Need to consider Prolia in future if the next bone density shows any worsening.        MD Yudi Gan William P., MD

## 2018-05-17 ENCOUNTER — OFFICE VISIT (OUTPATIENT)
Dept: OTHER | Facility: HOSPITAL | Age: 83
End: 2018-05-17
Attending: INTERNAL MEDICINE

## 2018-05-17 DIAGNOSIS — C50.919 MALIGNANT NEOPLASM OF FEMALE BREAST, UNSPECIFIED ESTROGEN RECEPTOR STATUS, UNSPECIFIED LATERALITY, UNSPECIFIED SITE OF BREAST (HCC): Primary | ICD-10-CM

## 2018-05-17 PROCEDURE — 99212-NC PR NO CHARGE CBC OFFICE OUTPATIENT VISIT 10 MINUTES: Performed by: NURSE PRACTITIONER

## 2018-05-17 NOTE — PROGRESS NOTES
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 Here today in our Cancer Survivorship Clinic, to review survivorship care plan.    SURVIVORSHIP DISCUSSION HELD TODAY:   Primary patient goal(s): wellness     Management of disease and treatment related effects: Patient unsure of need for visit when she arrived. Rates distress 2/10 on distress thermometer with mild effect on level of functioning and no patient identified areas of concern. She was interested in reviewing her treatment summary and NCCN guidelines for follow up so we spent some time in review of these. Overall she reports minimal difficulty with Arimidex, no joint aches or pains, denies hot flashes. We did spend some time discussing importance of monitoring bone density in light of her osteopenia and particularly discussed importance of weight bearing exercise, calcium and vitamin D supplementation.     Psychosocial and spiritual: Overall patient feels very positively about her treatment outcome. She lives with her  who is 92. She enjoys gardening and growing her own fruits and vegetables. She tries to stay active. She denies any distress or mood changes. Discussed availability of reiki and massage therapy available at the [facility] by appointment and written information provided. Discussed the possible benefits as a complementary nonpharmacologic modality for stress reduction, improving sleep and pain management.       Patient does not have advance care planning complete. Information provided regarding upcoming Advance Care Planning classes offered monthly at the [facility] which she states she will consider.          Maintaining personal wellness: We discussed the [employer]  "\"Stronger Together\" program which provides a 3 month [facility] family membership free of charge and written information provided.          Discussed NCCN recommendations for all cancer survivors of 150 minutes/week moderate intensity exercise, achieve and maintain a healthy BMI, plants-based whole-foods diet, avoid tobacco and second hand smoke, moderate alcohol intake - no more than 1 drink per day for women, 2 drinks per day for men.       After review of the Survivorship Treatment Summary & Care Plan, the patient verbalized understanding of recommendations for follow-up. As outlined in the care plan, they were advised to continue with follow-up care in accordance with the NCCN surveillance guidelines while transitioning back to their primary care physician for continued general preventive and healthcare needs. We discussed the importance of healthy eating, exercise and weight management. We reviewed current guidelines for routine screening of other cancers.     A copy of the Survivorship Treatment Summary & Care Plan for the patient (see below) was provided to and forwarded to the providers identified on the care team.    Breast Cancer Survivorship Plan      General Information   Patient name Angela Quinn   Date of birth 12/5/1931    Phone   Home Phone [phone]      Email [email or web address]      Cancer Treatment Team     Patient Care Team:  Marla Lyons MD as Consulting Physician (Hematology and Oncology)  Gregor Bagley MD as Referring Physician (Breast Surgery)  Kyra Sage MD as Consulting Physician (Radiation Oncology)    Provider Phone numbers  Care Team Provider: Mychal Soliz MD,  ([phone])  Care Team Provider: Marla Lyons MD,  ([phone])  Care Team Provider: Gregor Bagley MD,  ([phone])  Care Team Provider: Mychal Soliz MD,  ([phone])  Care Team Provider: Kyra Sage MD,  ([phone])     Post Treatment Care Team   Primary Care Physician Mychal LLANOS" MD Heydi  [address] [place] [address]   [phone]         Background Information   Medical history Past Medical History:   • Breast cancer   • Cancer    Left breast cancer, [id number] invasive ductal carcinoma   • Colon polyp, hyperplastic   • Depression   • Disease of thyroid gland   • [place] (hard of hearing)   • Hyperlipidemia   • Hypertension   • Osteopenia   • Radiation   • Sleep apnea      Surgical history Past Surgical History:   • BREAST BIOPSY   • BREAST LUMPECTOMY    left side   • CATARACT EXTRACTION   •  SECTION    x3   • COLONOSCOPY   • COLONOSCOPY    Procedure:  colonoscopy into cecum with saline injection, hot snare polypectomy, APC used in ascending polyp area;  Surgeon: Luke Linton MD;  Location: [facility] ENDOSCOPY;  Service:    • COLONOSCOPY    Procedure: COLONOSCOPY into cecum with polypectomies and methylene blue inj and saline inj. (5 cc.);  Surgeon: Luke Linton MD;  Location: [facility] ENDOSCOPY;  Service:    • COLONOSCOPY W/ BIOPSIES   • MYOMECTOMY    Partial      Tobacco use History   Smoking Status   • Never Smoker   Smokeless Tobacco   • Never Used      Family oncology history Cancer-related family history includes Cancer in her mother; Colon cancer in her mother.      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              Complications during Therapy:   No concerns stated   Modification to Treatment Plan:  No Modifications      Lifetime Dose Tracking:   No doses have been documented on this patient for the following tracked chemicals: Doxorubicin, Epirubicin, Idarubicin, Daunorubicin, Mitoxantrone, Bleomycin, Mitomycin, Doxorubicin Liposomal         [No treatment plan]         Persistent Treatment-Associated Adverse Effects at Completion of Therapy   It is important to recognize that not every person experiences the following adverse events after treatment.  You may not have any of these issues, a few or many adverse effects.  Experiences are highly variable.  Please discuss any adverse effects of cancer treatment with your cancer care team.      After Surgical Therapy   Breast Conserving Surgery (Lumpectomy)     Breast conserving surgery (lumpectomy) involves the removal of the breast mass (cancer lump) and a surrounding area of normal tissue. After surgery, there may be pain and soreness in the chest, underarm or shoulder which should get better over time. Nerves may be damaged in the breast and areas of lymph node removal which may result in numbness and other changes in sensation. Ask your doctor or nurse if you have issues with pain, numbness or tingling, or any changes in function or mobility.      Breast conserving surgery allows women to keep their breast but the breast may look different than it did before surgery. The breast may be smaller and may be different in size and shape. There will be a scar from the surgery and scar tissue may feel different. Radiation therapy can also affect the way the breast looks and feels. How  you think and feel about your body is important and coping with changes after breast surgery takes time.  It's important to look at your scar, which should become less red and swollen over time. It's important to touch your scar, too.  Your physician may give you instructions about massaging the scar to help with healing and to soften scar tissue. If you have a partner, let your partner look at and feel the scar when you're ready.  Working through feelings about the cancer and changes as a result of surgery may take time and support. Talk with your doctor or nurse about any issues with body image and coping.      Survivors of breast cancer should speak with their health care provider regarding the possibility of a genetic or family syndrome. If there does appear to be a family history or possible genetic syndrome, genetic counseling and testing may be recommended.    [place] Node Biopsy   Removal of the sentinel lymph node is the removal of the first lymph node to which cancer cells are most likely to spread. After surgery, there may be pain and soreness in the area where the node was removed.  Nerves may be damaged which may result in numbness or other changes in sensation. While sentinel node biopsy (as opposed to a lymph node dissection where more lymph nodes are removed) decreases the risk of developing lymphedema, the risk is not completely gone.     Lymphedema   Removal of lymph nodes can slow the normal flow of lymph in the area which can lead to swelling in that limb, also called lymphedema.  Survivors who also received radiation therapy to the area where a lymph node was removed may be at increased risk of developing lymphedema. In some cases, the lymphedema can occur years after cancer therapy was completed. Lymphedema can cause pain or discomfort, disfigurement, change in function, and increased risk of infection in the affected area and closest limb. Signs of lymphedema can include a feeling of fullness  or heaviness, changes in the skin (red, thick, stiff), aching, tightness, and difficulty moving or flexing nearby joints.  Other signs could be that your jewelry or clothes, like socks, pants or sleeves, may begin to feel tight on the affected limb. As signs of lymphedema could develop months or years after treatment, continue to monitor for signs and notify your doctor.      Several steps can be taken to help prevent and control lymphedema. Survivors should protect the potentially affected limb by avoiding cuts, scrapes, burns, insect bites, shots/vaccines, blood draws, and IV sticks in order to decrease the risk of developing an infection in the limb. In addition, the survivor should protect the limb from the sun to avoid sunburn.  Lastly, survivors should avoid tight clothing and jewelry, and blood pressures in the affected limb that might further slow the normal flow of lymph.      Survivors of cancer, including those at risk for lymphedema, can and should exercise. Survivors should start slow and gradually increase intensity while monitoring your limb for changes in swelling or redness. If either occurs, stop exercising and notify your doctor for further direction.            After Chemotherapy   No chemotherapy received.      After Radiation Therapy   Radiation therapy can cause early and late side effects.  Early side effects are those that happen during or shortly after treatment and are usually gone within a few weeks after treatment ends.  Late side effects may take months or years to develop and vary depending on the areas of the body included in the field of radiation and the radiation techniques that were used.      The most common early side effects are fatigue (feeling tired) and skin changes.  Other early side effects are usually related to the area being treated. If you continue to have some skin problems after treatment ends, be gentle with the skin in the treatment area until all signs of  irritation are gone and continue to care for your skin as your doctors and nurses have advised. If you continue to have fatigue, you may need to plan your activities to maximize energy, get extra rest while your body is still recovering and follow other instructions from your doctors and nurses such as exercise and activity.    Long term effects of radiation therapy vary greatly depending on the areas included in the field of radiation and the radiation techniques that were used. Breast tissue exposed to radiation may become more firm over time and you may notice changes in the size and shape of the breast. The skin where the breast was treated may have a different coloration, be more red or appear tanned. If the lymph nodes under the arm (axillary nodes) were in the radiation field, there may be an increased risk of developing lymphedema.  Lymphedema is a condition in which fluid collects in the arm or other areas such as the hand, fingers, chest or back and cause swelling. In rare cases, radiation therapy can increase the risk of a second cancer. Ask your doctors and nurses about the risk of long term effects. Keep your follow up appointments and keep up with recommended health screenings        Hormone Therapy    Hormones, like estrogen and progesterone, are naturally produced in the body. Typically these hormones help our body function in various ways, however, in some cases these hormones can also cause cancer to grow.  When your cancer is positive for hormone receptors, your doctor may recommend hormone therapy. Hormone therapy works by either blocking the effects of the hormone on the cancer cell, or by reducing the amount of hormone produced by the body.  In doing so, the cancer cells no longer receive or use the hormone to grow.      It is very important for you to take the medicine as prescribed by your doctor and keep your regular follow-up appointments. Some medications interfere with hormone therapy  medications, so be sure and discuss all medications that you take with your doctor.  Common side effects women experience from hormone therapy includes hot flashes, vaginal dryness, and lack of a period in women who have not yet reached menopause.  Some less common but serious side effects of hormone therapy may include increased risk for blood clots, joint pain, bone loss, weakness, mood changes, nausea, fatigue, and loss of interest in sexual activity, endometrial and uterine cancers, risk for stroke, heart attack, angina, and heart failure. Share with your doctor the side effects you experience so a plan can be made to minimize the effects. In addition, these medications will be taken long-term, in some cases 5-10 years. When taking oral hormone therapy, it may be helpful to create a calendar, use a pillbox, or set an alarm on your watch or phone to remind you daily to take the medication.      Care of your Venous Access Device   No Venous Access Device currently in place      General After Cancer Treatment        It is not uncommon for cancer to impact other areas of your life such as relationships, work and mental health.  If you develop financial concerns, resources are sometimes available to assist in these areas.  Depression and anxiety can present either during or after cancer diagnosis and treatment.  It is important to discuss with your physician any of these concerns so these resources can be made available to you.      General Cancer Support & Resources    [facility]    Cancer Resource Center & Multidisciplinary Clinic:  [address] [place] [address]  [phone]    Survivorship Clinical Nurse Specialist  Elsa Farley [facility] [phone]    Oncology Social Worker:  Mary Escobar [facility] - [phone]     Psychiatric Nurse Practitioner:  Bonita Person [facility] [phone]    Financial Counselor and Contact Information:  [facility] -  [phone]    Oncology Dietician Contact Information:  Kandy Anguiano RD - [phone]     Managing Anxiety or Depression:  Referral to support group, e.g. American Cancer Society (www.cancer.org, 252.472.4352), Cancer Support Community (www.startuply.org, 467.157.9593)    Referral to a community provider for cognitive behavioral therapy or counseling.    Psychiatric care or counseling and/or initiation of pharmacotherapy are available at the [facility] Psychosocial Supportive Oncology . Please call [phone] or talk to a member of your treatment team about a referral.    For anonymous information, resource requests or support you can also call the 24-hour Crisis and Information Center at [phone]      Fear of Cancer Coming Back:  Patients need to know that these feelings are normal, and they won’t cause the cancer to come back.    • Referral for cognitive behavioral therapy or counseling    • Referral to support group, e.g. American Cancer Society (www.cancer.org, 310.471.4240), Cancer Support Community (www.startuply.org, 754.260.8529)      Fatigue:  • Regular physical activity (goal of 150 minutes of activity per week)  • Evaluation for hypothyroidism, anemia, depression      Financial Concerns:  The financial challenges that people with cancer and their families face are very real.     For hospital bills, you may want to talk with a hospital financial counselor. You may be able to work out a monthly payment plan or even get a reduced rate. You may also want to stay in touch with your insurance company to make sure costs are covered.    For information about resources that are available you can go to the NCI database, “Organizations That Offer Support Services,” at http://www.cancer.gov, search terms “financial assistance.”     Or call the NCI toll-free 6-889-6-CANCER (1-198.572.2049) to ask for help.      Managing Hot Flashes:  Ask your doctor about appropriate medical  treatments. Medicines that may help manage hot flashes include Venlafaxine (Effexor) 37.5mg up to 75 mg daily OR Gabapentin (Neurontin) 300mg at bedtime up to 3 times/day.   OR referral to gynecologist      Managing Insomnia  · Practice good sleep hygiene (reduce/eliminate TV and electronic device screen time one hour before bed)  · Evaluation and management of other symptoms (hot flashes, anxiety, and pain)  · Regular physical activity (goal of 150 minutes of activity per week)  · Mindfulness based stress reduction      Concerns about Sexuality, Intimacy and Body Image    • Evaluation and treatment for anxiety, depression, as appropriate    • Referral to counseling and/or support group to address body image concerns, such as breast asymmetry, prosthesis, e.g. the American Cancer Society’s Look Good…Feel Better program (www.cancer.org, 515.101.5811)    • Recommendation for vaginal lubricant (Astroglide) or moisturizer (Replens, 1x     every 3 days)    • Screening for pain with intercourse      About Lymphedema:  According to the National Cancer [place], anywhere from 5-17% of women who have sentinel lymph node biopsy develops lymphedema. Among women who have axillary lymph node dissection, the percentage is higher - from 20-53% - and risk increases with the number of nodes taken out.     • Referral to lymphedema physical therapy specialist for lymphatic massage  • Weight training  • Maintenance of healthy weight    Tips to reduce the risk of injury or infection to the arm:  · Treat infections of the at-risk arm and hand right away.  · Wear gloves when doing house or garden work.  · Keep skin clean and well-moisturized.  · Use the arm not at risk when having blood drawn, getting injections or having blood pressure taken.  · Avoid sunburn and excess heat from saunas, hot baths, tanning and other sources.  · Don't cut nail cuticles.  · Use insect repellant when outdoors.  · Avoid injuries, including scratches and  "bruises, to the at-risk arm.  · Rest the at-risk arm in an elevated position (above the heart or shoulder).     If you have an infection, injury or any of the symptoms listed above, see your health care provider.        Changes in Memory or \"Brain Fog\"  Patients should know that 25% of cancer patients have cognitive dysfunction after treatment and it usually gets better over time    Some things you can do to manage \"brain fog\" or memory problems include:  · Mental exercises (e.g., word games, crossword puzzles)   · Setting up and following routines  · Repeating information  · Keeping a memory journal  · Avoiding multitasking  · Exercising  · Maintaining a healthy diet.   · There is some evidence that Group Cognitive Training is also effective  · Rule out depression, sleep disturbance      Maintain a Healthy Weight:  Aim for a target BMI of 20-25 m2  (Body mass index is 22.97 kg/m².)    An oncology specialized registered dietician is available at the [facility] for consultations to you. Please call [phone]      Prevention and Wellness:  · Achieve and maintain a healthy body weight as weight gain is a risk factor for development or recurrence of some cancers (BMI between 20-25m2).  · Current Body mass index is 22.97 kg/m².  · Regular physical activity (preferably daily). Strive for at least 150 minutes of moderate or 75 minutes of vigorous activity per week.  · Maintain a diet high in vegetables, fruits and whole grains and low in sugars and fats. Limit red meat and avoid processed foods.  · Avoid all tobacco and second hand smoke.  · Minimize alcohol intake  · No more than one drink per day for women and two drinks per day for men.  · Continue all standard non-cancer related healthcare with your primary care provider. Any new, unusual, and/or persistent symptoms should be brought to the attention of your provider.        Surveillance    How Frequent?    Medical Oncology visits 1 - 4 times per " year as clinically appropriate for 5 years, then annually      Lab tests As clinically indicated    Imaging exams      Mammography Annual clinical breast exam needed    Schedule a mammogram one year after the first mammogram that led to diagnosis, but no earlier than six months after radiation therapy. Obtain a mammogram every six to 12 months thereafter.      Lymphedema Monitor for lymphedema    Genetic Counseling Periodic screening for changes in family history and referral to genetic counseling as indicated    Gynecologic assessment Pap smear: Beginning at age 30, every three years if the last three PAP tests in a row were normal. Every year if the last PAP was abnormal. every 3 years as long as the last 3 were normal, every year if abnormal.     Women age 70 and older who have had 3 or more normal PAP tests in a row, and no abnormal PAP tests results in the last 10 years, may choose to stop having PAP tests.    Pelvic exam:  Continue to visit a gynecologist annually.      Bone Density study Bone densitometry every 1 to 2 years after initiation of aromatase inhibitor (if applicable) and/or at age 65 or older  Calcium and Vitamin D  Weight-bearing exercise  Avoidance of smoking/smoking cessation counseling, if appropriate  Bisphosphonate, if indicated per bone densitometry    Last DEXA Scan was 7/11/2017    Immunizations       Influenza       Herpes Zoster       Pneumococcal Yearly  Once  As appropriate    Tobacco Cessation The patient is not currently a tobacco user.  Counseling given: Not Answered         Monitor for ongoing toxicities:   None Specified      Call your doctor if you have any of these signs or symptoms   New or worsening symptoms.  Pain that is new, unrelieved or bothersome.  Difficulty with your emotions, anxiety, and/or depression.  Physical problems that affect your abilities in your daily life or those that are bothersome (for example, continued fatigue, trouble sleeping, sexual problems, or  edema).      Referrals provided   There are no referral needs at this time.           Self Care Plan                      Self Care Plan: What You Can Do to Stay Healthy after Treatment for Cancer       Cancer treatments may increase your chance of developing other health problems years after you have completed treatment. The purpose of this self care plan is to inform you about what steps you can take to maintain good health after cancer treatment. Keep in mind that every person treated for cancer is different and that these recommendations are not intended to be a substitute for the advice of a doctor or other health care professional. Please use these recommendations to talk with your health care provider about an appropriate follow up care plan for you.       Surveillance for Your Cancer    Recommendation Frequency Comments   Cancer surveillance visit with medical provider that is focused on detecting signs of recurrence of your cancer.   For additional information, visit   www.livestrong.org or   www.cancer.net/patient/Survivorship  Frequency depends on type and stage of cancer you had. (If you had a higher risk cancer, you may be seen more often).    Your doctor has provided you with a personalized cancer treatment summary and survivorship care plan. If you need another copy, ask your doctor.             General Cancer Screening for Women     Cancer screening tests are designed to find cancer or pre-cancerous areas before there are any symptoms and, generally, when treatments are most successful. Various organizations have developed guidelines for cancer screening for women. While these guidelines vary slightly between different organizations, they cover the same basic screening tests for breast, cervical and colorectal cancers.   In addition, during routine health examinations (at any age) your health care provider may also evaluate for cancers of the skin, mouth and thyroid. Not all screening tests are right  for everyone. Your personal and family cancer history, and/or the presence of a known genetic predisposition, can affect which tests are right for you, and at what age you begin them. Therefore, you should discuss these with your health care provider. Your care plan will also include a section on follow up care for your type of cancer, and these recommendations override the general screening recommendations for that particular type of cancer in the general population.     The American Cancer Society (ACS) recommends these screening guidelines for women:    Recommendation   Frequency Comments   Breast Cancer Screening   For more information, see the ACS document Breast Cancer: Early Detection.   www.cancer.org/ssLINK/breast-cancer-early-detection-checo  · Yearly mammograms starting at age 40, and continuing for as long as a woman is in good health.   · Clinical breast exam (CBE), performed by a health care professional, every three years for women in their 20s and 30s, and every year for women 40 and over.   · A monthly breast self-exam (BSE) is a good way to monitor breast health. Women should know how their breasts normally look and feel, and report any change promptly to their health care provider.  The ACS recommends that some women - because of their family history, a genetic tendency, or certain other factors - be screened with Magnetic Resonance Imaging (MRI) in addition to mammograms. The number of women who fall into this category is small (less than 2 percent of all [place] women). Talk with your doctor about your personal history and whether you should have additional tests at an earlier age.    Colon and Rectal Cancer Screening   For more information see the ACS document    Colorectal Cancer: Early Detection.   www.cancer.org/ssLINK/colorectal-cancer-early-detection-checo  Options for colon cancer screening can be divided into those that screen for both cancer and polyps, and those that just screen for cancer.  "Screening should begin at age 50 (unless you are considered \"high risk\" (see comments), using one of the following testing schedules:   Tests that find polyps and cancer   (Preferred over those that find cancer alone. If any of these tests are positive, a colonoscopy should be done.)   · Flexible sigmoidoscopy every five years, or   · Colonoscopy every 10 years, or   · Double-contrast barium enema every five years, or   · CT colonography (virtual colonoscopy) every five years     Tests that primarily test for cancer   · Yearly fecal occult blood test (FOBT)*, or   · Yearly fecal immunochemical test (FIT) *, or   · Stool DNA test (sDNA), interval uncertain*     * The multiple stool take-home test should be used. One test done by the doctor in the office is not adequate. A colonoscopy should be done if the test is positive.   Talk with your doctor about your medical history, and what colorectal cancer screening test and schedule is best for you.   Individuals at higher risk of colon cancer should have screening earlier and potentially more frequently. Those at higher risk of colon and rectal cancer:     · Individuals with a family history of colon or rectal cancer in a relative who was diagnosed before the age of 60   · Individuals with a history of polyps   · Individuals with inflammatory bowel disease (Crohn's disease or ulcerative colitis)   · Individuals with a genetic predisposition to colon or rectal cancer, such as hereditary non-polyposis colon cancer (HNPCC) syndrome or familial adenomatous polyposis (FAP) syndrome       Cervical Cancer Screening     For more information see the ACS document Cervical Cancer: Early Detection.   www.cancer.org/.../cervical-cancer-prevention-and-early-detection-checo  Cervical cancer screening should not begin before age 21. Screening Pap tests should be performed every three years between age 21 and 29   · Women age 30 or older should be screened every 3 years with a Pap test or " every five years with a combined Pap/Human Papillomavirus (HPV) test.   · Women 65 years of age or older who have had negative consecutive screening in the preceding 10 years should discontinue screening.   · Women who have had a total hysterectomy (removal of the uterus and cervix) should also stop having Pap tests, unless the surgery was done as a treatment for cervical cancer or pre-cancer. Women who have had a hysterectomy without removal of the cervix should continue to have Pap tests.   · Women who have had a history of a serious cervical precancer should continue screening for at least 20 years, even if that extends screening past age 65.   · Women who have been vaccinated against HPV should still follow these screening guidelines.  Treatment for most gynecological cancers involves hysterectomy and alters recommendations for Pap tests. Refer to your personalized cancer treatment summary and survivorship care plan to see what the Pap test recommendations are for you. If you need another copy of your care plan, please ask your doctor.        Sun Exposure and Skin Cancer Risk     Skin cancer is the most commonly diagnosed type of cancer, and rates are on the rise. However, this is one cancer that in most cases can be prevented or detected early. While you may hear that you need the sun to make vitamin D, in reality you only need a few minutes a day to do this. Exposure to ultraviolet (UV) rays, either by natural sunlight or tanning beds, can lead to skin cancer. In addition, UV rays lead to other forms of skin damage, including wrinkles, loss of skin elasticity, dark patches (sometimes called age spots or liver spots), and pre-cancerous skin changes (such as dry, scaly, rough patches). Although dark-skinned people are less likely to develop skin cancer, they can and do develop skin cancers, most often in areas that are not exposed to sun (on the soles of the feet, under nails, and genitals).   You can do a lot  "to protect yourself from damaging UV rays and to detect skin cancer early. Start by practicing sun safety, including using a broad spectrum sunscreen (which protects against UVA and UVB rays)  with an SPF of at least 30 every day, avoiding peak sun times (10 a.m. to 4 p.m., when the rays are strongest) and wearing protective clothing such as hats, sunglasses and long-sleeved shirts.   Examine your skin regularly so you become familiar with any moles or birthmarks. If a mole has changed in any way, you should have a health care provider examine the area. This includes a change in size, shape or color; the development of scaliness, bleeding, oozing, itchiness or pain; or the development of a sore that will not heal. If you have a lot of moles, it may be helpful to make note of moles using photographs or a \"mole map\".     For a guide to performing a skin exam, visit www.skincarephysicians.com/skincancernet/skin_examinations.html.      Healthy Lifestyle    For some cancer survivors, the experience is the motivation to making healthy lifestyle changes. It may seem insignificant, but these changes have been shown to reduce the risk of the cancer coming back or a new cancer developing. Below are some tips on adopting a healthier lifestyle. Maintaining a healthy weight is important in cancer prevention, as is physical activity and eating a healthy diet. Strive to incorporate all three pieces of the puzzle: healthy weight, balanced diet and regular exercise.    Recommendation Goal Comments   Maintain a healthy weight.     For more information, visit   http://www.nhlbi.nih.gov/health/public/heart/obesity/lose_wt/index.htm   www.win.niddk.nih.gov   Call the American Heart Association   1-818.476.6915   · Talk to your health care team about what a healthy weight is for you, and take steps to reach and maintain that weight.  · For many people reaching their ideal weight can be a challenge; however, losing even 5 to 10 pounds can " lower blood pressure, blood sugar and cholesterol levels.   · Weigh yourself weekly to monitor for weight gain/loss  Being overweight can increase your chance of your cancer coming back. Maintaining a healthy weight, physical activity and eating a healthy diet are all important in cancer prevention.   Being overweight can increase your chance of having high blood pressure, high blood sugar and/or high cholesterol, which can lead to heart disease, diabetes and stroke. If you would like more information about your blood sugar, cholesterol or blood pressure, talk with your primary care provider.      Eat a healthy diet, mostly from plant sources.     For more information, visit   http://www.bVisual.gov/food-groups/  · Eat healthy, including plenty of fruits and vegetables daily.   · Drink more water, less soda and juice.   · Limit how much alcohol you drink (if you drink at all).  Strive to have two-thirds of your plate be vegetables, fruits, whole grains and beans, while one-third or less should be an animal product. Choose fish and chicken and limit red meat and processed meats. Limit intake of alcohol to two drinks per day.   Exercise.     To learn more about recommendations for diet, activity and weight, visit   AICR’s Guidelines for Survivors   http://preventcancer.aicr.org/site/PageServer?pagename=patients_survivors_guidelines   ACS Eat Healthy and Get Active   www.cancer.org/Healthy/EatHealthyGetActive/index · Experts recommend at least 30 minutes of moderate-to-vigorous activity per day, five days a week.  Research shows that exercise can help you control your weight, improve your energy level, and help you sleep at night. The key is to find a physical activity you enjoy such as walking, dancing or gardening and do it regularly. If you have been inactive for a while, start out slowly. You can start out by exercising 10 minutes a day several days a week. If you feel dizzy, short of breath, or have chest  pain during exercise, stop exercising and talk with your primary care doctor.   Do not use tobacco in any form.     For more information, visit   http://www.cdc.gov/tobacco/campaign/tips/  · If you use tobacco, quit as soon as possible.  Smoking is the most preventable cause of death in the [place] If you would like more information, ask your doctor or you can call a national hotline at 6(809)-QUIT-NOW.    Keep your bones healthy.   For more information, visit   www.niams.nih.gov/Health.../Bone/Bone_Health/bone_health_for_life   For the FRAX (Fracture Risk Assessment) tool, visit:   www.shef.ac.uk/FRAX/ , to estimate 10-year risks for fractures  · Ask your primary care provider about screening for osteoporosis beginning at age 65 or at a younger age if your bone fracture risk is increased.   · The 10-year risk for osteoporotic fractures can be calculated for individuals by using the FRAX tool and could help to guide screening decisions for women younger than 65 years.   · Maximize your bone health by eating healthy, getting enough calcium and vitamin D, and exercising regularly.  Certain cancer treatments, such as chemotherapy or hormonal therapy, can cause bone loss. In addition, after menopause, women can lose up to 20 percent of their bone density. The good news is that women can maximize their bone density by eating healthy, getting enough calcium and vitamin D, and exercising regularly.     Age (years) Calcium per day Vitamin D per day   19 to 49 1000 milligrams 600 units   50 or over 1200 milligrams 800 units     ·    Have regular check-ups by a healthcare professional.     For more information about healthy screening tests for men visit the U.S. Department of Health and Human Services.   http://www.womenshealth.gov/screening-tests-and-vaccines/screening-tests-for-men/  For more information about adult vaccinations visit the CDC:   http://www.cdc.gov/vaccines/recs/schedules/adult-schedule.htm  · Keep up-to-date  on general health screening tests, including cholesterol, blood pressure and glucose (blood sugar) levels.   · Get an annual influenza vaccine (flu shot).   · Get vaccinated with the pneumococcal vaccine, which prevents a type of pneumonia, and re-vaccinated as determined by your health care team.   · Don’t forget dental and eye health!  · The American Optometric Association recommends adults have their eyes examined every two years until age 60, then annually. People who wear glasses or corrective lenses or are at high risk for eye problems (i.e., diabetics, family history of eye disease) should be seen more frequently.   · The American Dental Association recommends adults see their dentist at least once a year.

## 2018-05-27 DIAGNOSIS — E03.9 ADULT HYPOTHYROIDISM: ICD-10-CM

## 2018-05-27 DIAGNOSIS — R03.0 ELEVATED BLOOD PRESSURE READING WITHOUT DIAGNOSIS OF HYPERTENSION: ICD-10-CM

## 2018-05-29 RX ORDER — LEVOTHYROXINE SODIUM 0.07 MG/1
TABLET ORAL
Qty: 90 TABLET | Refills: 1 | Status: SHIPPED | OUTPATIENT
Start: 2018-05-29 | End: 2018-11-25 | Stop reason: SDUPTHER

## 2018-05-29 RX ORDER — HYDROCHLOROTHIAZIDE 25 MG/1
TABLET ORAL
Qty: 90 TABLET | Refills: 1 | Status: SHIPPED | OUTPATIENT
Start: 2018-05-29 | End: 2018-11-25 | Stop reason: SDUPTHER

## 2018-06-25 ENCOUNTER — OFFICE VISIT (OUTPATIENT)
Dept: INTERNAL MEDICINE | Age: 83
End: 2018-06-25

## 2018-06-25 VITALS
OXYGEN SATURATION: 97 % | WEIGHT: 146.2 LBS | HEIGHT: 68 IN | TEMPERATURE: 97.6 F | BODY MASS INDEX: 22.16 KG/M2 | DIASTOLIC BLOOD PRESSURE: 62 MMHG | HEART RATE: 61 BPM | SYSTOLIC BLOOD PRESSURE: 124 MMHG

## 2018-06-25 DIAGNOSIS — I10 ESSENTIAL HYPERTENSION: Primary | ICD-10-CM

## 2018-06-25 DIAGNOSIS — Z00.00 ROUTINE HEALTH MAINTENANCE: ICD-10-CM

## 2018-06-25 DIAGNOSIS — E78.5 HYPERLIPIDEMIA, UNSPECIFIED HYPERLIPIDEMIA TYPE: ICD-10-CM

## 2018-06-25 PROBLEM — R05.9 COUGH: Status: RESOLVED | Noted: 2017-01-03 | Resolved: 2018-06-25

## 2018-06-25 LAB
CHOLEST SERPL-MCNC: 183 MG/DL (ref 0–200)
HDLC SERPL-MCNC: 63 MG/DL (ref 40–60)
LDLC SERPL CALC-MCNC: 103 MG/DL (ref 0–100)
TRIGL SERPL-MCNC: 85 MG/DL (ref 0–150)
VLDLC SERPL CALC-MCNC: 17 MG/DL (ref 5–40)

## 2018-06-25 PROCEDURE — 99213 OFFICE O/P EST LOW 20 MIN: CPT | Performed by: INTERNAL MEDICINE

## 2018-06-25 NOTE — PROGRESS NOTES
"[facility]  MD Anglea BOB KURT Hutchisonjuliana / 86 y.o. / female  06/25/2018      ASSESSMENT & PLAN:    Problem List Items Addressed This Visit        Unprioritized    HLD (hyperlipidemia)    Relevant Medications    atorvastatin (LIPITOR) 10 MG tablet    Other Relevant Orders    Lipid Panel    Routine health maintenance    Hypertension - Primary    Relevant Medications    hydrochlorothiazide (HYDRODIURIL) 25 MG tablet        Orders Placed This Encounter   Procedures   • Lipid Panel       Summary/Discussion:    Number-one hypertension stable on current medication.  Plan: Same meds, blood pressure check 4 months.    #2 hyperlipidemia status be determined on medication.  Plan: Same meds, check lipids today follow-up results by mail adjust treatment if indicated.    #3 routine health maintenance, schedule annual wellness visit, patient encouraged to augment fluid intake to approximately 64 ounces per day.    Return in about 4 months (around 10/25/2018) for Annual wellness visit.    ____________________________________________________________________    VITALS    Visit Vitals  /62   Pulse 61   Temp 97.6 °F (36.4 °C)   Ht 172 cm (67.72\")   Wt 66.3 kg (146 lb 3.2 oz)   SpO2 97%   BMI 22.42 kg/m²       BP Readings from Last 3 Encounters:   06/25/18 124/62   05/08/18 116/68   12/29/17 130/70     Wt Readings from Last 3 Encounters:   06/25/18 66.3 kg (146 lb 3.2 oz)   05/08/18 67.9 kg (149 lb 12.8 oz)   12/29/17 65.1 kg (143 lb 9.6 oz)      Body mass index is 22.42 kg/m².    CC:  Main reason(s) for today's visit: Hypertension and Hyperlipidemia      HPI:     Patient presents today to follow-up hypertension and hyperlipidemia.    Hypertension: She is compliant with medication, is not monitor blood pressure the outpatient setting, she does not use dietary table salt.  No medication side effects noted    Hyperlipidemia: She is compliant with low fat diet and atorvastatin.  She is fasting for lab work today.  " There are no medication side effects noted.    Routine health maintenance: Patient has colonoscopy scheduled for December, mammogram scheduled, and is willing to have an annual wellness visit arranged.  I did suggest that she get the shingles shot as well.    Patient relates an incident approximately one week ago where she became weak diaphoretic and lightheaded.  She does not drink quite enough fluid, and is also on a diuretic.  The following morning she was fine and asymptomatic.  I suspect this represents a bit of dehydration and we did discuss the need for up to 64 ounces of fluid per day in 6-8 servings.     Laboratory review May 2018 CMP and CBC normal.      Patient Care Team:  Mychal Soliz MD as PCP - General  Mychal Soliz MD as PCP - Family Medicine  Mychal Soliz MD as PCP - [facility] Attributed  Marla Lyons MD as Consulting Physician (Hematology and Oncology)  Gregor Bagley MD as Referring Physician (Breast Surgery)  Kyra Sage MD as Consulting Physician (Radiation Oncology)  ____________________________________________________________________    REVIEW OF SYSTEMS    Review of Systems   Respiratory: Negative for chest tightness and shortness of breath.    Cardiovascular: Negative for chest pain and palpitations.   Neurological: Negative for dizziness, syncope, speech difficulty, weakness, light-headedness and headaches.         PHYSICAL EXAMINATION    Physical Exam   Constitutional: She is oriented to person, place, and time. She appears well-developed and well-nourished. No distress.   Cardiovascular: Normal rate, regular rhythm, normal heart sounds and intact distal pulses.  Exam reveals no gallop and no friction rub.    No murmur heard.  Pulmonary/Chest: Effort normal and breath sounds normal. She has no wheezes. She has no rales.   Musculoskeletal: She exhibits no edema.   Neurological: She is alert and oriented to person, place, and time.   Skin: Skin is warm and dry. No rash noted.    Psychiatric: She has a normal mood and affect. Her behavior is normal. Judgment and thought content normal.   Nursing note and vitals reviewed.        REVIEWED DATA:    Labs:     Lab Results   Component Value Date     05/08/2018    K 3.5 05/08/2018    AST 23 05/08/2018    ALT 12 05/08/2018    BUN 18 05/08/2018    CREATININE 0.78 05/08/2018    CREATININE 0.68 11/21/2017    CREATININE 0.65 05/15/2017    EGFRIFNONA 70 05/08/2018    EGFRIFAFRI 96 01/04/2017       Lab Results   Component Value Date    GLUCOSE 103 05/08/2018    GLUCOSE 61 (L) 11/21/2017    GLUCOSE 97 05/15/2017       Lab Results   Component Value Date    LDL 82 01/04/2017    LDL 78 10/18/2016    LDL 91 07/15/2015    HDL 67 (H) 01/04/2017    TRIG 148 01/04/2017       Lab Results   Component Value Date    TSH 3.540 01/04/2017       Lab Results   Component Value Date    WBC 5.04 05/08/2018    HGB 12.8 05/08/2018    HGB 13.1 11/21/2017    HGB 12.8 06/13/2017     05/08/2018       No results found for: PSA      Imaging:         Medical Tests:         Summary of old records / correspondence / consultant report:         Request outside records:         ALLERGIES  Allergies   Allergen Reactions   • No Known Drug Allergy         MEDICATIONS  Current Outpatient Prescriptions   Medication Sig Dispense Refill   • alendronate (FOSAMAX) 35 MG tablet TAKE 1 TABLET EVERY 7 DAYS (SUBSTITUTED FOR FOSAMAX) 12 tablet 0   • amitriptyline (ELAVIL) 25 MG tablet TAKE 1 TABLET BY MOUTH AT BEDTIME  30 tablet 4   • anastrozole (ARIMIDEX) 1 MG tablet TAKE 1 TABLET EVERY DAY 90 tablet 1   • atorvastatin (LIPITOR) 10 MG tablet TAKE 1 TABLET EVERY DAY 90 tablet 1   • Azelastine-Fluticasone 137-50 MCG/ACT suspension 2 sprays into each nostril 2 (Two) Times a Day. 1 bottle 1   • Calcium Carb-Cholecalciferol (CALCIUM 1000 + D) 1000-800 MG-UNIT tablet Take  by mouth.     • chlorhexidine (PERIDEX) 0.12 % solution      • hydrochlorothiazide (HYDRODIURIL) 25 MG tablet TAKE 1  TABLET EVERY DAY 90 tablet 1   • levothyroxine (SYNTHROID, LEVOTHROID) 75 MCG tablet TAKE 1 TABLET EVERY DAY 90 tablet 1     No current facility-administered medications for this visit.        PFSH:     The following portions of the patient's history were reviewed and updated as appropriate: Allergies / Current Medications / Past Medical History / Surgical History / Social History / Family History    PROBLEM LIST   Patient Active Problem List   Diagnosis   • Breast CA   • Hypothyroidism   • HLD (hyperlipidemia)   • Routine health maintenance   • Imbalance   • Altered bowel habits   • Colon polyp, hyperplastic   • Colon polyps   • Depression   • Diverticulitis of intestine   • Osteoarthritis   • Hearing loss   • Hypertension   • Obstructive sleep apnea syndrome   • Osteoporosis   • Urinary incontinence   • Medicare annual wellness visit, initial   • Encounter for immunization   • Peripheral polyneuropathy   • Vaginal discharge   • Osteopenia of both lower legs   • Fibroids, submucosal       PAST MEDICAL HISTORY  Past Medical History:   Diagnosis Date   • Breast cancer    • Cancer     Left breast cancer, [id number] invasive ductal carcinoma   • Colon polyp, hyperplastic 2016   • Depression    • Disease of thyroid gland    • [place] (hard of hearing)    • Hyperlipidemia    • Hypertension    • Osteopenia    • Radiation    • Sleep apnea        SURGICAL HISTORY  Past Surgical History:   Procedure Laterality Date   • BREAST BIOPSY     • BREAST LUMPECTOMY      left side   • CATARACT EXTRACTION     •  SECTION      x3   • COLONOSCOPY     • COLONOSCOPY N/A 2016    Procedure:  colonoscopy into cecum with saline injection, hot snare polypectomy, APC used in ascending polyp area;  Surgeon: Luke Linton MD;  Location: [facility] ENDOSCOPY;  Service:    • COLONOSCOPY N/A 2016    Procedure: COLONOSCOPY into cecum with polypectomies and methylene blue inj and saline inj. (5 cc.);  Surgeon: Luke Linton MD;   Location: [facility] ENDOSCOPY;  Service:    • COLONOSCOPY W/ BIOPSIES  2009   • MYOMECTOMY  08/11/2017    Partial       SOCIAL HISTORY  Social History     Social History   • Marital status:      Spouse name: Gregor   • Number of children: 3     Occupational History   • Office    •  Retired     Social History Main Topics   • Smoking status: Never Smoker   • Smokeless tobacco: Never Used   • Alcohol use No   • Drug use: No   • Sexual activity: Defer     Other Topics Concern   • Not on file     Social History Narrative           FAMILY HISTORY  Family History   Problem Relation Age of Onset   • Colon cancer Mother    • Cancer Mother    • Coronary artery disease Father    • Heart attack Father    • Heart disease Father    • No Known Problems Brother        Health Maintenance Due   Topic   • ZOSTER VACCINE (3 of 3)   • COLONOSCOPY    • MEDICARE ANNUAL WELLNESS        Overdue health maintenance interventions  reviewed with patient.    Dictated utilizing Dragon voice recognition software

## 2018-06-28 RX ORDER — ANASTROZOLE 1 MG/1
TABLET ORAL
Qty: 90 TABLET | Refills: 1 | Status: SHIPPED | OUTPATIENT
Start: 2018-06-28 | End: 2019-01-17 | Stop reason: SDUPTHER

## 2018-06-29 RX ORDER — ALENDRONATE SODIUM 35 MG/1
TABLET ORAL
Qty: 12 TABLET | Refills: 0 | Status: SHIPPED | OUTPATIENT
Start: 2018-06-29 | End: 2018-11-06 | Stop reason: SDUPTHER

## 2018-08-28 DIAGNOSIS — G62.9 PERIPHERAL POLYNEUROPATHY: ICD-10-CM

## 2018-08-28 RX ORDER — AMITRIPTYLINE HYDROCHLORIDE 25 MG/1
25 TABLET, FILM COATED ORAL
Qty: 90 TABLET | Refills: 3 | Status: SHIPPED | OUTPATIENT
Start: 2018-08-28 | End: 2019-09-16 | Stop reason: SDUPTHER

## 2018-10-23 ENCOUNTER — LAB (OUTPATIENT)
Dept: LAB | Facility: HOSPITAL | Age: 83
End: 2018-10-23

## 2018-10-23 ENCOUNTER — TRANSCRIBE ORDERS (OUTPATIENT)
Dept: ADMINISTRATIVE | Facility: HOSPITAL | Age: 83
End: 2018-10-23

## 2018-10-23 ENCOUNTER — OFFICE VISIT (OUTPATIENT)
Dept: ONCOLOGY | Facility: CLINIC | Age: 83
End: 2018-10-23

## 2018-10-23 VITALS
HEIGHT: 68 IN | BODY MASS INDEX: 22.88 KG/M2 | RESPIRATION RATE: 16 BRPM | SYSTOLIC BLOOD PRESSURE: 122 MMHG | DIASTOLIC BLOOD PRESSURE: 56 MMHG | OXYGEN SATURATION: 98 % | WEIGHT: 151 LBS | TEMPERATURE: 98.4 F | HEART RATE: 85 BPM

## 2018-10-23 DIAGNOSIS — C50.919 MALIGNANT NEOPLASM OF FEMALE BREAST, UNSPECIFIED ESTROGEN RECEPTOR STATUS, UNSPECIFIED LATERALITY, UNSPECIFIED SITE OF BREAST (HCC): ICD-10-CM

## 2018-10-23 DIAGNOSIS — Z12.31 VISIT FOR SCREENING MAMMOGRAM: Primary | ICD-10-CM

## 2018-10-23 DIAGNOSIS — M85.861 OSTEOPENIA OF BOTH LOWER LEGS: ICD-10-CM

## 2018-10-23 DIAGNOSIS — Z12.39 SCREENING BREAST EXAMINATION: Primary | ICD-10-CM

## 2018-10-23 DIAGNOSIS — M85.862 OSTEOPENIA OF BOTH LOWER LEGS: ICD-10-CM

## 2018-10-23 LAB
ALBUMIN SERPL-MCNC: 4 G/DL (ref 3.5–5.2)
ALBUMIN/GLOB SERPL: 1.3 G/DL (ref 1.1–2.4)
ALP SERPL-CCNC: 91 U/L (ref 38–116)
ALT SERPL W P-5'-P-CCNC: 14 U/L (ref 0–33)
ANION GAP SERPL CALCULATED.3IONS-SCNC: 9.7 MMOL/L
AST SERPL-CCNC: 21 U/L (ref 0–32)
BASOPHILS # BLD AUTO: 0.03 10*3/MM3 (ref 0–0.1)
BASOPHILS NFR BLD AUTO: 0.6 % (ref 0–1.1)
BILIRUB SERPL-MCNC: 0.6 MG/DL (ref 0.1–1.2)
BUN BLD-MCNC: 11 MG/DL (ref 6–20)
BUN/CREAT SERPL: 15.9 (ref 7.3–30)
CALCIUM SPEC-SCNC: 9.1 MG/DL (ref 8.5–10.2)
CHLORIDE SERPL-SCNC: 97 MMOL/L (ref 98–107)
CO2 SERPL-SCNC: 32.3 MMOL/L (ref 22–29)
CREAT BLD-MCNC: 0.69 MG/DL (ref 0.6–1.1)
DEPRECATED RDW RBC AUTO: 46.7 FL (ref 37–49)
EOSINOPHIL # BLD AUTO: 0.16 10*3/MM3 (ref 0–0.36)
EOSINOPHIL NFR BLD AUTO: 3.1 % (ref 1–5)
ERYTHROCYTE [DISTWIDTH] IN BLOOD BY AUTOMATED COUNT: 13.1 % (ref 11.7–14.5)
GFR SERPL CREATININE-BSD FRML MDRD: 81 ML/MIN/1.73
GLOBULIN UR ELPH-MCNC: 3.2 GM/DL (ref 1.8–3.5)
GLUCOSE BLD-MCNC: 128 MG/DL (ref 74–124)
HCT VFR BLD AUTO: 39.1 % (ref 34–45)
HGB BLD-MCNC: 12.6 G/DL (ref 11.5–14.9)
IMM GRANULOCYTES # BLD: 0.02 10*3/MM3 (ref 0–0.03)
IMM GRANULOCYTES NFR BLD: 0.4 % (ref 0–0.5)
LYMPHOCYTES # BLD AUTO: 1.71 10*3/MM3 (ref 1–3.5)
LYMPHOCYTES NFR BLD AUTO: 32.9 % (ref 20–49)
MCH RBC QN AUTO: 31.4 PG (ref 27–33)
MCHC RBC AUTO-ENTMCNC: 32.2 G/DL (ref 32–35)
MCV RBC AUTO: 97.5 FL (ref 83–97)
MONOCYTES # BLD AUTO: 0.48 10*3/MM3 (ref 0.25–0.8)
MONOCYTES NFR BLD AUTO: 9.2 % (ref 4–12)
NEUTROPHILS # BLD AUTO: 2.79 10*3/MM3 (ref 1.5–7)
NEUTROPHILS NFR BLD AUTO: 53.8 % (ref 39–75)
NRBC BLD MANUAL-RTO: 0 /100 WBC (ref 0–0)
PLATELET # BLD AUTO: 216 10*3/MM3 (ref 150–375)
PMV BLD AUTO: 10.5 FL (ref 8.9–12.1)
POTASSIUM BLD-SCNC: 3.8 MMOL/L (ref 3.5–4.7)
PROT SERPL-MCNC: 7.2 G/DL (ref 6.3–8)
RBC # BLD AUTO: 4.01 10*6/MM3 (ref 3.9–5)
SODIUM BLD-SCNC: 139 MMOL/L (ref 134–145)
WBC NRBC COR # BLD: 5.19 10*3/MM3 (ref 4–10)

## 2018-10-23 PROCEDURE — 99213 OFFICE O/P EST LOW 20 MIN: CPT | Performed by: INTERNAL MEDICINE

## 2018-10-23 PROCEDURE — 85025 COMPLETE CBC W/AUTO DIFF WBC: CPT | Performed by: INTERNAL MEDICINE

## 2018-10-23 PROCEDURE — 36415 COLL VENOUS BLD VENIPUNCTURE: CPT | Performed by: INTERNAL MEDICINE

## 2018-10-23 PROCEDURE — 80053 COMPREHEN METABOLIC PANEL: CPT | Performed by: INTERNAL MEDICINE

## 2018-10-23 NOTE — PROGRESS NOTES
Subjective .     REASONS FOR FOLLOWUP:    [id number] invasive ductal carcinoma in the left breast. The mammogram showed the lesion to be 1.3 x 1.7 cm. She had a digital mammogram that showed a 1.8 cm mass in the left breast at 6 o'clock position. The biopsy was consistent wi t h invasive ductal carcinoma, grade 2, [place] score of 6 out of 9, ER positive greater than 90%, AL positive greater than 90%, HER-2/shannon 2+ by immunohistochemistry but negative by FISH. She is status post lumpectomy which showed a 1.3 x 1.7 cm mass, gr a de 2, [place] score of 6 out of 9 and there was a component of DCIS. A second focus of DCIS was present along the superior margin away from the actual mass and the superior margin was involved. As a result she underwent re-excision and this was negati ve. She had 3 lymph nodes removed and there was macrometastasis in 1 lymph node which was about 0.4 cm. Currently the patient refuses chemotherapy and is going to be started on hormonal treatment with Arimidex.      HISTORY OF PRESENT ILLNESS:  The patient is a 86 y.o. year old female who is here for follow-up with the above-mentioned history.    History of Present Illness     The patient is an elderly female, currently here for followup. She is on Arimidex for her [id number] invasive ductal carcinoma of the left breast.  Given her age patient had refused chemotherapy and currently is just on Arimidex.  She is tolerating it very well.  She started Arimidex since June 2014.  She is 4 years out.  She does not have much of arthralgias.  She is on Fosamax for osteopenia.  Her last bone density was in July 2017.  She is overdue for mammogram.  Last mammogram was June 2017.  We will obtain screening mammogram in 1 week.      PAST MEDICAL HISTORY:   1. Significant for high cholesterol.   2. Hyperthyroidism.   3. Depression.   4. History of hypertension.  5.    PAST SURGICAL HISTORY: Three C-sections.     OB/GYN HISTORY: She started menstrual periods  at age 14. She attained menopause around 50. She is  3, para 3, no miscarriages. She was 23 years old when she had her first child and 29 when she had the last one. Her children are 59, 58 and 54 years old. Two girls and 1 boy.     ONCOLOGIC HISTORY      ONCOLOGIC HISTORY: The patient is an 82-year-old female who likes to be called Tati who has noticed a bump in the left breast. As a result, she went to her primary care physician and mammogram was ordered. On 2014 she underwent mammogram at [facility] and the mammogram showed evidence of a 1.3 x 0.7 x 0.9 cm irregular hypoechoic mass that extends into the skin and in the left breast at the 7 o'clock position 11 cm from the nipple. As a result a biopsy was recommended. The mammogra m on the right was negative. She underwent a bilateral digital mammogram which showed that she had scattered fibroglandular densities throughout both breasts within the posterior one-third of the left breast at the 6 o'clock position . At the inframammary fold there was an irregular mass that measured 1.8 cm in greatest dimension with spiculation and architectural distortion noted. No abnormality was seen on the right breast. Mildly prominent left axillary lymph node was noted. Ultrasound was done which showed that there was a 1.3 x 0.7 x 0.9 cm irregular mass that extended into the skin. As a result, the patient underwent ultrasound-guided biopsy on 2014. The pathology, accession #[id number]. 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 The histologic grade was 2, Brinkley score was 6 out of 9 and the maximal span of invasive carcinoma in the core was 8 mm. The estrogen receptors were done and was greater than 90%, progestero n e receptor was 90%, HER-2 by immunohistochemistry was 2+ but by FISH HER/2 ratio was 1.4 which is negative. Subsequently, the patient was referred to   [facility] and MRI of the breast was performed on 05/02/2014 and MRI showed that within the left breas t at 6 o'clock position in the posterior one-third near the inframammary fold there was an irregular enhancing mass with internal metallic clip. There was linear enhancement extending anteriorly away from the mass. The mass itself measured 2 cm in the sup e rior to inferior dimension, 2 cm in anterior to posterior dimension and 1.9 cm in the medial to lateral dimension and extending anteriorly away from the mass and contiguous with the mass is a linear clump enhancement that measured about 2.5 cm in the ant e rior posterior dimension. This brings the greatest dimension of the mass and adjacent near clump enhancement to be a total of 4.4 cm. There were no other areas that were suspicious. The right breast was negative. As a result the patient underwent surger y . She underwent left breast lumpectomy with sentinel lymph node sampling on 05/22/2014. The pathology accession number is [id number]. Pathology is consistent with invasive ductal carcinoma intermediate grade, Otto score 6, tumor size 1.5 x 1.3 x 1.2 c m. 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 Intermediate nuclear grade minor component margins were focally involved by DCIS of the superior margin. There is a focus of intermediate grade DCIS located approximately 1.3 cm from the invasive carcinoma, which involves the superior margin and distance of invasive carcinoma from the closest margin was 1 mm superiorly and distance of in situ carcinoma from the closest margins was involved superiorly. There were 3 lymph nodes removed. There was micrometast a sis in 1 lymph node and the size of the micrometastasis was 0.4 cm and it is a [id number] invasive ductal carcinoma. There was tumor invasion of dermis present. The patient then had to undergo a second surgery on 06/11/2014 because of positive margin. She u nderwent re-excision of the superior margin of the left  breast. The pathology accession number is [id number] and the patient underwent a left breast lumpectomy re-excision. Focal non-atypical ductal hyperplasia, extensive fat necrosis with fibrosis and mix e d inflammation, no atypia. The patient then was referred here for the evaluation of options of treatment. She has already seen Dr. Pat Sage yesterday with plans of starting radiation on Monday. She has healed up from the surgery. She is here to disc uss options of further treatment from us. Currently she has no complaints except for a cough, which is chronic, which is likely secondary to allergies as the cough gets worse during the allergy season.    Patient has been on Arimidex since 2014 with olive ns to continue a total of 5 years.    Radiation between 2014 to 2014.    The 2015 mammogram is benign. We will continue Arimidex.    2016 mammogram negative.    MEDICATIONS: The current medication list was reviewed with the patient and updates in the EMR this date per the medical assistant. Medication dosages and frequencies were confirmed to be accurate.     ALLERGIES: Not allergic to any medications.     SOCIAL HISTORY: She is  and lives with her . She is very much into gardening and reading. She does not smoke. She does not drink alcohol. She has no history of any previous blood transfusions  .   FAMILY HISTORY: Father  of a heart attack at 69. Mother had colon cancer and  of colon cancer at 76. She has 1 brother who is 77 and in good health. There is no other family history of breast cancer.             Current Outpatient Prescriptions on File Prior to Visit   Medication Sig Dispense Refill   • alendronate (FOSAMAX) 35 MG tablet TAKE 1 TABLET EVERY 7 DAYS (SUBSTITUTED FOR FOSAMAX) 12 tablet 0   • amitriptyline (ELAVIL) 25 MG tablet Take 1 tablet by mouth every night at bedtime. 90 tablet 3   • anastrozole (ARIMIDEX) 1 MG tablet TAKE 1 TABLET EVERY DAY 90 tablet 1   •  "atorvastatin (LIPITOR) 10 MG tablet TAKE 1 TABLET EVERY DAY 90 tablet 1   • Azelastine-Fluticasone 137-50 MCG/ACT suspension 2 sprays into each nostril 2 (Two) Times a Day. 1 bottle 1   • Calcium Carb-Cholecalciferol (CALCIUM 1000 + D) 1000-800 MG-UNIT tablet Take  by mouth.     • chlorhexidine (PERIDEX) 0.12 % solution      • hydrochlorothiazide (HYDRODIURIL) 25 MG tablet TAKE 1 TABLET EVERY DAY 90 tablet 1   • levothyroxine (SYNTHROID, LEVOTHROID) 75 MCG tablet TAKE 1 TABLET EVERY DAY 90 tablet 1     No current facility-administered medications on file prior to visit.          Review of Systems   Constitutional: Negative for appetite change, chills, diaphoresis, fatigue, fever and unexpected weight change.   HENT: Negative for hearing loss, sore throat and trouble swallowing.    Respiratory: Negative for cough, chest tightness, shortness of breath and wheezing.    Cardiovascular: Negative for chest pain, palpitations and leg swelling.   Gastrointestinal: Negative for abdominal distention, abdominal pain, constipation, diarrhea, nausea and vomiting.   Genitourinary: Negative for dysuria, frequency, hematuria and urgency.   Musculoskeletal: Negative for joint swelling.        No muscle weakness.   Skin: Negative for rash and wound.   Neurological: Negative for seizures, syncope, speech difficulty, weakness, numbness and headaches.   Hematological: Negative for adenopathy. Does not bruise/bleed easily.   Psychiatric/Behavioral: Negative for behavioral problems, confusion and suicidal ideas.     Objective      Vitals:    10/23/18 1124   BP: 122/56   Pulse: 85   Resp: 16   Temp: 98.4 °F (36.9 °C)   TempSrc: Oral   SpO2: 98%   Weight: 68.5 kg (151 lb)   Height: 172 cm (67.72\")   PainSc: 0-No pain     Current Status 10/23/2018   ECOG score 0       Physical Exam       GENERAL:  Well-developed, well-nourished in no acute distress.   SKIN:  Warm, dry without rashes, purpura or petechiae.  EYES:  Pupils equal, round and " reactive to light.  EOMs intact.  Conjunctivae normal.  EARS:  Hearing intact.  NOSE:  Septum midline.  No excoriations or nasal discharge.  MOUTH:  Tongue is well-papillated; no stomatitis or ulcers.  Lips normal.  THROAT:  Oropharynx without lesions or exudates.  NECK:  Supple with good range of motion; no thyromegaly or masses, no JVD.  LYMPHATICS:  No cervical, supraclavicular, axillary or inguinal adenopathy.  CHEST:  Lungs clear to auscultation. Good airflow.  BREASTS: Right and left breast: no breast masses, no axillary adenopthy, no nipple discharge. No supraclavicular adenopathy.  No skin changes, no nipple discharge    CARDIAC:  Regular rate and rhythm without murmurs, rubs or gallops. Normal S1,S2.  ABDOMEN:  Soft, nontender with no hepatosplenomegaly or masses.  EXTREMITIES:  No clubbing, cyanosis or edema.  NEUROLOGICAL:  Cranial Nerves II-XII grossly intact.  No focal neurological deficits.  PSYCHIATRIC:  Normal affect and mood.          RECENT LABS:  Hematology WBC   Date Value Ref Range Status   10/23/2018 5.19 4.00 - 10.00 10*3/mm3 Final     RBC   Date Value Ref Range Status   10/23/2018 4.01 3.90 - 5.00 10*6/mm3 Final     Hemoglobin   Date Value Ref Range Status   10/23/2018 12.6 11.5 - 14.9 g/dL Final     Hematocrit   Date Value Ref Range Status   10/23/2018 39.1 34.0 - 45.0 % Final     Platelets   Date Value Ref Range Status   10/23/2018 216 150 - 375 10*3/mm3 Final        Assessment/Plan      1. This is a patient with history of T1N1MX invasive ductal carcinoma of the left breast status post surgery, status post radiation, currently she is tolerating Arimidex.      2. Mild hot flashes related to Arimidex.      3. Osteopenia for which she is taking calcium, vitamin D and fosomax.      4. Our plan is to continue Arimidex 1 mg daily. She is 4 years into treatment.     5. We will plan to bring her back in 6 months with CBC, CMP, LDH and we will get a bone density prior to that.    6.  Obtain  screening mammogram in 1 week.  We will discuss the results on the phone.  She is to call me for the results.    MD Yudi Gan William P., MD

## 2018-10-24 ENCOUNTER — OFFICE VISIT (OUTPATIENT)
Dept: INTERNAL MEDICINE | Age: 83
End: 2018-10-24

## 2018-10-24 VITALS
BODY MASS INDEX: 23.76 KG/M2 | SYSTOLIC BLOOD PRESSURE: 118 MMHG | OXYGEN SATURATION: 98 % | HEART RATE: 95 BPM | TEMPERATURE: 97.3 F | HEIGHT: 67 IN | WEIGHT: 151.4 LBS | DIASTOLIC BLOOD PRESSURE: 74 MMHG

## 2018-10-24 DIAGNOSIS — Z00.00 MEDICARE ANNUAL WELLNESS VISIT, SUBSEQUENT: Primary | ICD-10-CM

## 2018-10-24 PROCEDURE — G0439 PPPS, SUBSEQ VISIT: HCPCS | Performed by: NURSE PRACTITIONER

## 2018-10-24 NOTE — PATIENT INSTRUCTIONS
Medicare Wellness  Personal Prevention Plan of Service     Date of Office Visit:  10/24/2018  Encounter Provider:  NATASHA Turner  Place of Service:  [facility]  Patient Name: Angela Quinn  :  1931    As part of the Medicare Wellness portion of your visit today, we are providing you with this personalized preventive plan of services (PPPS). This plan is based upon recommendations of the [place] Preventive Services Task Force (USPSTF) and the Advisory Committee on Immunization Practices (ACIP).    This lists the preventive care services that should be considered, and provides dates of when you are due. Items listed as completed are up-to-date and do not require any further intervention.      Age-Appropriate Screening Schedule:  (Refer to the list below for future screening recommendations based on patient's age, sex and/or medical conditions. Orders for these recommended tests are listed in the plan section. The patient has been provided with a written plan)    Health Maintenance Topics  Health Maintenance   Topic Date Due   • ZOSTER VACCINE (3 of 3) 2014   • COLONOSCOPY  2017   • MAMMOGRAM  2018   • INFLUENZA VACCINE  2018   • LIPID PANEL  2019   • DXA SCAN  2019   • TDAP/TD VACCINES (2 - Td) 2026   • PNEUMOCOCCAL VACCINES (65+ LOW/MEDIUM RISK)  Completed       Health Maintenance Topics Due or Over-Due  Health Maintenance Due   Topic Date Due   • ZOSTER VACCINE (3 of 3) 2014   • COLONOSCOPY  2017   • MAMMOGRAM  2018   • INFLUENZA VACCINE  2018       ADDITIONAL RESOURCES:    For excellent information on senior health and wellness refer to the [employer] [place] [employer] web-site at:    [email or web address] .[email or web address].GOV

## 2018-10-24 NOTE — PROGRESS NOTES
"10/24/2018    MEDICARE ANNUAL WELLNESS VISIT    Angela Quinn is a 86 y.o. female who presents for SUBSEQUENT AWV    Patient's general assessment of her health since a year ago:     - Compared to one year ago, she feels her physical health is about the same without significant change.    - Compared to one year ago, she feels her mental health is about the same without significant change.      HPI for other active medical problems:         * The required components of Health Risk Assessment (HRA) that were completed by the patient and/or my staff are contained within this note and in the scanned documents titled \"Health Risk Assessment\" within the media section of the patient's chart in [employer].       HISTORY    Recent Hospitalizations:    Recent hospitalization? : No    If YES, location, date, and diagnoses:     · Location:   · Date:   · Principle Discharge Dx:   · Secondary Dx:       Patient Care Team:    Patient Care Team:  Mychal Soliz MD as PCP - General  Mychal Soliz MD as PCP - [facility]  Marla Lyons MD as PCP - Claims Attributed  Marla Lyons MD as Consulting Physician (Hematology and Oncology)  Gregor Bagley MD as Referring Physician (Breast Surgery)  Kyra Sage MD as Consulting Physician (Radiation Oncology)  Dorene Malloy MD as Consulting Physician (Ophthalmology)  Luke Linton MD as Consulting Physician (Gastroenterology)      Allergies:  Patient has no known allergies.    Medications:  Outpatient Medications Prior to Visit   Medication Sig Dispense Refill   • alendronate (FOSAMAX) 35 MG tablet TAKE 1 TABLET EVERY 7 DAYS (SUBSTITUTED FOR FOSAMAX) 12 tablet 0   • amitriptyline (ELAVIL) 25 MG tablet Take 1 tablet by mouth every night at bedtime. 90 tablet 3   • anastrozole (ARIMIDEX) 1 MG tablet TAKE 1 TABLET EVERY DAY 90 tablet 1   • atorvastatin (LIPITOR) 10 MG tablet TAKE 1 TABLET EVERY DAY 90 tablet 1   • Azelastine-Fluticasone 137-50 MCG/ACT suspension 2 " sprays into each nostril 2 (Two) Times a Day. 1 bottle 1   • Calcium Carb-Cholecalciferol (CALCIUM 1000 + D) 1000-800 MG-UNIT tablet Take  by mouth.     • chlorhexidine (PERIDEX) 0.12 % solution      • hydrochlorothiazide (HYDRODIURIL) 25 MG tablet TAKE 1 TABLET EVERY DAY 90 tablet 1   • levothyroxine (SYNTHROID, LEVOTHROID) 75 MCG tablet TAKE 1 TABLET EVERY DAY 90 tablet 1     No facility-administered medications prior to visit.        PFSH:     The following portions of the patient's history were reviewed and updated as appropriate: Allergies / Current Medications / Past Medical History / Surgical History / Social History / Family History    Problem List:  Patient Active Problem List   Diagnosis   • Breast CA (CMS/HCC)   • Hypothyroidism   • HLD (hyperlipidemia)   • Routine health maintenance   • Imbalance   • Altered bowel habits   • Colon polyp, hyperplastic   • Colon polyps   • Depression   • Diverticulitis of intestine   • Osteoarthritis   • Hearing loss   • Hypertension   • Obstructive sleep apnea syndrome   • Osteoporosis   • Urinary incontinence   • Medicare annual wellness visit, initial   • Encounter for immunization   • Peripheral polyneuropathy   • Vaginal discharge   • Osteopenia of both lower legs   • Fibroids, submucosal       Past Medical History:  Past Medical History:   Diagnosis Date   • Breast cancer (CMS/HCC)    • Cancer (CMS/HCC)     Left breast cancer, [id number] invasive ductal carcinoma   • Cataract    • Colon polyp, hyperplastic 2016   • Depression    • Disease of thyroid gland    • Glaucoma    • Kaw (hard of hearing)    • Hyperlipidemia    • Hypertension    • Osteopenia    • Radiation    • Sleep apnea        Past Surgical History:  Past Surgical History:   Procedure Laterality Date   • BREAST BIOPSY     • BREAST LUMPECTOMY      left side   • CATARACT EXTRACTION     •  SECTION      x3   • COLONOSCOPY     • COLONOSCOPY N/A 2016    Procedure:  colonoscopy into cecum with  "saline injection, hot snare polypectomy, APC used in ascending polyp area;  Surgeon: Luke Linton MD;  Location: [facility];  Service:    • COLONOSCOPY N/A 12/12/2016    Procedure: COLONOSCOPY into cecum with polypectomies and methylene blue inj and saline inj. (5 cc.);  Surgeon: Luke Linton MD;  Location: [facility];  Service:    • COLONOSCOPY W/ BIOPSIES  2009   • MYOMECTOMY  08/11/2017    Partial       Social History:  Social History     Social History   • Marital status:      Spouse name: Gregor   • Number of children: 3     Occupational History   • Office    •  Retired     Social History Main Topics   • Smoking status: Never Smoker   • Smokeless tobacco: Never Used   • Alcohol use No   • Drug use: No   • Sexual activity: Defer     Other Topics Concern   • Not on file     Social History Narrative           Family History:  Family History   Problem Relation Age of Onset   • Colon cancer Mother    • Cancer Mother    • Coronary artery disease Father    • Heart attack Father    • Heart disease Father    • No Known Problems Brother          PATIENT ASSESSMENT    Vitals:  /74   Pulse 95   Temp 97.3 °F (36.3 °C)   Ht 170.2 cm (67\")   Wt 68.7 kg (151 lb 6.4 oz)   SpO2 98%   BMI 23.71 kg/m²   BP Readings from Last 3 Encounters:   10/24/18 118/74   10/23/18 122/56   06/25/18 124/62     Wt Readings from Last 3 Encounters:   10/24/18 68.7 kg (151 lb 6.4 oz)   10/23/18 68.5 kg (151 lb)   06/25/18 66.3 kg (146 lb 3.2 oz)      Body mass index is 23.71 kg/m².    Pain Score    10/24/18 0809   PainSc: 0-No pain         Review of Systems:    Review of Systems   Constitutional: Negative for activity change, appetite change and fatigue.   Respiratory: Negative for shortness of breath.    Cardiovascular: Negative for chest pain.         Physical Exam:    Physical Exam   Constitutional: She is oriented to person, place, and time. Vital signs are normal. She appears well-developed and " well-nourished. She is cooperative. She does not appear ill. No distress.   HENT:   Right Ear: Decreased hearing is noted.   Left Ear: Decreased hearing is noted.   Cardiovascular: Normal rate, regular rhythm, S1 normal, S2 normal and normal heart sounds.    No murmur heard.  Pulmonary/Chest: Effort normal and breath sounds normal. She has no decreased breath sounds. She has no wheezes. She has no rhonchi. She has no rales.   Neurological: She is alert and oriented to person, place, and time.   Skin: Skin is warm, dry and intact.   Psychiatric: She has a normal mood and affect. Her speech is normal and behavior is normal. Judgment and thought content normal. Cognition and memory are normal.   Nursing note and vitals reviewed.        Reviewed Data:    Labs:   Lab Results   Component Value Date     10/23/2018    K 3.8 10/23/2018    AST 21 10/23/2018    ALT 14 10/23/2018    BUN 11 10/23/2018    CREATININE 0.69 10/23/2018    CREATININE 0.78 05/08/2018    CREATININE 0.68 11/21/2017    EGFRIFNONA 81 10/23/2018    EGFRIFAFRI 96 01/04/2017       Lab Results   Component Value Date    GLU 98 01/04/2017    GLU 95 07/15/2015       Lab Results   Component Value Date     (H) 06/25/2018    LDL 82 01/04/2017    LDL 78 10/18/2016    HDL 63 (H) 06/25/2018    TRIG 85 06/25/2018       Lab Results   Component Value Date    TSH 3.540 01/04/2017          Lab Results   Component Value Date    WBC 5.19 10/23/2018    HGB 12.6 10/23/2018    HGB 12.8 05/08/2018    HGB 13.1 11/21/2017     10/23/2018                 No results found for: PSA    Imaging:          Medical Tests:          Screening for Glaucoma:  Previous screening for glaucoma?: Yes      Hearing Loss Screen: Patient not wearing hearing aids today  Finger Rub Hearing Test (right ear): failed  Finger Rub Hearing Test (left ear): failed      Urinary Incontinence Screen:  Episodes of urinary incontinence? : No      Depression Screen:  PHQ-2/PHQ-9 Depression  Screening 10/23/2018   Little interest or pleasure in doing things 0   Feeling down, depressed, or hopeless 0   Trouble falling or staying asleep, or sleeping too much -   Feeling tired or having little energy -   Poor appetite or overeating -   Feeling bad about yourself - or that you are a failure or have let yourself or your family down -   Trouble concentrating on things, such as reading the newspaper or watching television -   Moving or speaking so slowly that other people could have noticed. Or the opposite - being so fidgety or restless that you have been moving around a lot more than usual -   Thoughts that you would be better off dead, or of hurting yourself in some way -   Total Score 0   If you checked off any problems, how difficult have these problems made it for you to do your work, take care of things at home, or get along with other people? -        PHQ-2: 0 (Not depressed)    PHQ-9: 0 (Negative screening for depression)       FUNCTIONAL, FALL RISK, & COGNITIVE SCREENING (Components below):    DATA:    Functional & Cognitive Status 10/23/2018   Do you have difficulty preparing food and eating? No   Do you have difficulty bathing yourself, getting dressed or grooming yourself? No   Do you have difficulty using the toilet? No   Do you have difficulty moving around from place to place? No   Do you have trouble with steps or getting out of a bed or a chair? No   In the past year have you fallen or experienced a near fall? Yes   Do you need help using the phone?  No   Are you deaf or do you have serious difficulty hearing?  Yes   Do you need help with transportation? No   Do you need help shopping? No   Do you need help preparing meals?  No   Do you need help with housework?  No   Do you need help with laundry? No   Do you need help taking your medications? No   Do you need help managing money? No   Do you ever drive or ride in a car without wearing a seat belt? No   Do you have difficulty concentrating,  remembering or making decisions? No       Fall Risk Assessment  Fallen in past 6 months: 0--> No  Mental Status: 0--> no mental status change  Mobility: 2--> New mobility issue  Medications: 5--> Diuretics  Total Fall Risk Score: 9    A) Assessment of Functional Ability:  (Assessment of ability to perform ADL's (showering/bathing, using toilet, dressing, feeding self, moving self around) and IADL's (use telephone, shop, prepare food, housekeep, do laundry, transport independently, take medications independently, and handle finances)    Degree of functional impairment: MILD (based on assessment noted above)      B) Assessment of Fall Risk:     - Fall within the last year? : No   - Feel unsteady when standing or walking? : No   - Worry about falling? : Yes    Need for further evaluation of gait, strength, and balance? : Yes    Timed Up and Go (TUG):   (>= 12 seconds indicates high risk for falling)    Observable abnormalities included: Normal gait pattern  slow tentative pace       C. Assessment of Cognitive Function:    Mini-Cog Test:     1) Registration (3 objects): N/A   2) Clock Draw: Passed? : Yes   3) Number of objects recalled: 2    Further evaluation required? : No      COUNSELING    A. Identification of Health Risk Factors:    Risk factors include: polypharmacy and increased fall risk      B. Age-Appropriate Screening Schedule:  (Refer to the list below for future screening recommendations based on patient's age, sex and/or medical conditions. Orders for these recommended tests are listed in the plan section. The patient has been provided with a written plan)    Health Maintenance Topics  Health Maintenance   Topic Date Due   • ZOSTER VACCINE (3 of 3) 04/19/2014   • COLONOSCOPY  12/12/2017   • MAMMOGRAM  06/23/2018   • INFLUENZA VACCINE  08/01/2018   • LIPID PANEL  06/25/2019   • DXA SCAN  07/11/2019   • TDAP/TD VACCINES (2 - Td) 02/22/2026   • PNEUMOCOCCAL VACCINES (65+ LOW/MEDIUM RISK)  Completed        Health Maintenance Topics Due or Over-Due  Health Maintenance Due   Topic Date Due   • ZOSTER VACCINE (3 of 3) 04/19/2014   • COLONOSCOPY  12/12/2017   • MAMMOGRAM  06/23/2018   • INFLUENZA VACCINE  08/01/2018         C. Advanced Care Planning:    has NO advanced directive - not interested in additional information      D. Patient Self-Management and Personalized Health Advice:    She has been provided with personalized counseling/information (including brochures/handouts) about:     -- fall prevention    She has been recommended for the following preventative services which has been performed today, will be ordered today or ordered/performed on upcoming follow-up visit:     -- fall risk assessment / plan of care completed, urinary incontinence assessment done, vaccination for influenza administered/recommended      E. Miscellaneous Items:    -Aspirin use counseling: Does not need ASA (and currently is not on it)    -Discussed BMI with her. The BMI is in the acceptable range    -Reviewed use of high risk medication in the elderly: YES    -Reviewed for potential of harmful drug interactions in the elderly: YES      IV. WRAP-UP    Assessment & Plan:    1) MEDICARE ANNUAL WELLNESS VISIT    2) OTHER MEDICAL CONDITIONS ADDRESSED TODAY:              Problem List Items Addressed This Visit     None      Visit Diagnoses     Medicare annual wellness visit, subsequent    -  Primary                  No orders of the defined types were placed in this encounter.      Discussion / Summary:    1. Medicare annual wellness visit, subsequent            Medications as of TODAY:              Current Outpatient Prescriptions   Medication Sig Dispense Refill   • alendronate (FOSAMAX) 35 MG tablet TAKE 1 TABLET EVERY 7 DAYS (SUBSTITUTED FOR FOSAMAX) 12 tablet 0   • amitriptyline (ELAVIL) 25 MG tablet Take 1 tablet by mouth every night at bedtime. 90 tablet 3   • anastrozole (ARIMIDEX) 1 MG tablet TAKE 1 TABLET EVERY DAY 90 tablet 1    • atorvastatin (LIPITOR) 10 MG tablet TAKE 1 TABLET EVERY DAY 90 tablet 1   • Azelastine-Fluticasone 137-50 MCG/ACT suspension 2 sprays into each nostril 2 (Two) Times a Day. 1 bottle 1   • Calcium Carb-Cholecalciferol (CALCIUM 1000 + D) 1000-800 MG-UNIT tablet Take  by mouth.     • chlorhexidine (PERIDEX) 0.12 % solution      • hydrochlorothiazide (HYDRODIURIL) 25 MG tablet TAKE 1 TABLET EVERY DAY 90 tablet 1   • levothyroxine (SYNTHROID, LEVOTHROID) 75 MCG tablet TAKE 1 TABLET EVERY DAY 90 tablet 1   • Multiple Vitamins-Minerals (MULTIVITAMIN ADULT PO) Take  by mouth.       No current facility-administered medications for this visit.          FOLLOW-UP:            No Follow-up on file.              Future Appointments  Date Time Provider Department Center   11/15/2018 3:30 PM [facility] 2  [facility]   4/9/2019 9:30 AM LAB CHAIR 1 [facility] CARROLL  [facility]   4/9/2019 10:00 AM Marla Lyons MD [facility]         ______________________________________________________________________      A printed After Visit Summary (AVS) including the Personalized Prevention  Plan Services (PPPS) was given to the patient at check-out today.         **Dragon Disclaimer:   Much of this encounter note is an electronic transcription/translation of spoken language to printed text. The electronic translation of spoken language may permit erroneous, or at times, nonsensical words or phrases to be inadvertently transcribed. Although I have reviewed the note for such errors, some may still exist.

## 2018-11-06 RX ORDER — ALENDRONATE SODIUM 35 MG/1
TABLET ORAL
Qty: 12 TABLET | Refills: 0 | Status: SHIPPED | OUTPATIENT
Start: 2018-11-06 | End: 2019-06-28 | Stop reason: SDUPTHER

## 2018-11-15 ENCOUNTER — HOSPITAL ENCOUNTER (OUTPATIENT)
Dept: MAMMOGRAPHY | Facility: HOSPITAL | Age: 83
Discharge: HOME | End: 2018-11-15
Attending: INTERNAL MEDICINE | Admitting: INTERNAL MEDICINE

## 2018-11-15 DIAGNOSIS — Z12.31 VISIT FOR SCREENING MAMMOGRAM: ICD-10-CM

## 2018-11-15 PROCEDURE — 77067 SCR MAMMO BI INCL CAD: CPT

## 2018-11-15 PROCEDURE — 77063 BREAST TOMOSYNTHESIS BI: CPT

## 2018-11-25 DIAGNOSIS — E03.9 ADULT HYPOTHYROIDISM: ICD-10-CM

## 2018-11-25 DIAGNOSIS — R03.0 ELEVATED BLOOD PRESSURE READING WITHOUT DIAGNOSIS OF HYPERTENSION: ICD-10-CM

## 2018-11-26 RX ORDER — HYDROCHLOROTHIAZIDE 25 MG/1
TABLET ORAL
Qty: 90 TABLET | Refills: 1 | Status: SHIPPED | OUTPATIENT
Start: 2018-11-26 | End: 2019-04-23 | Stop reason: SDUPTHER

## 2018-11-26 RX ORDER — LEVOTHYROXINE SODIUM 0.07 MG/1
TABLET ORAL
Qty: 90 TABLET | Refills: 1 | Status: SHIPPED | OUTPATIENT
Start: 2018-11-26 | End: 2019-04-23 | Stop reason: SDUPTHER

## 2019-01-17 RX ORDER — ANASTROZOLE 1 MG/1
TABLET ORAL
Qty: 90 TABLET | Refills: 1 | Status: SHIPPED | OUTPATIENT
Start: 2019-01-17 | End: 2019-04-24 | Stop reason: SDUPTHER

## 2019-02-05 ENCOUNTER — OFFICE VISIT (OUTPATIENT)
Dept: INTERNAL MEDICINE | Age: 84
End: 2019-02-05

## 2019-02-05 VITALS
OXYGEN SATURATION: 96 % | HEIGHT: 67 IN | HEART RATE: 93 BPM | SYSTOLIC BLOOD PRESSURE: 138 MMHG | TEMPERATURE: 98.2 F | BODY MASS INDEX: 24.2 KG/M2 | WEIGHT: 154.2 LBS | DIASTOLIC BLOOD PRESSURE: 84 MMHG

## 2019-02-05 DIAGNOSIS — I10 ESSENTIAL HYPERTENSION: ICD-10-CM

## 2019-02-05 DIAGNOSIS — E78.5 HYPERLIPIDEMIA, UNSPECIFIED HYPERLIPIDEMIA TYPE: ICD-10-CM

## 2019-02-05 DIAGNOSIS — E03.9 HYPOTHYROIDISM, UNSPECIFIED TYPE: Primary | ICD-10-CM

## 2019-02-05 DIAGNOSIS — M81.0 OSTEOPOROSIS, UNSPECIFIED OSTEOPOROSIS TYPE, UNSPECIFIED PATHOLOGICAL FRACTURE PRESENCE: ICD-10-CM

## 2019-02-05 PROCEDURE — 99214 OFFICE O/P EST MOD 30 MIN: CPT | Performed by: NURSE PRACTITIONER

## 2019-02-05 NOTE — PROGRESS NOTES
Subjective   Angela Quinn is a 87 y.o. female.     Hypertension   This is a chronic problem. The problem is controlled. Pertinent negatives include no anxiety, blurred vision, chest pain, headaches, malaise/fatigue, neck pain, orthopnea, palpitations, peripheral edema, PND, shortness of breath or sweats. Risk factors for coronary artery disease include post-menopausal state. Current antihypertension treatment includes diuretics. There are no compliance problems.    Hypothyroidism   This is a chronic problem. Pertinent negatives include no chest pain, headaches or neck pain. Associated symptoms comments: Reports increased hair shedding recently.   Hyperlipidemia   This is a chronic problem. Exacerbating diseases include hypothyroidism. There are no known factors aggravating her hyperlipidemia. Pertinent negatives include no chest pain or shortness of breath. Current antihyperlipidemic treatment includes statins. There are no compliance problems.         The following portions of the patient's history were reviewed and updated as appropriate: allergies, current medications, past family history, past medical history, past social history, past surgical history and problem list.    Review of Systems   Constitutional: Negative for malaise/fatigue.   Eyes: Negative for blurred vision.   Respiratory: Negative for shortness of breath.    Cardiovascular: Negative for chest pain, palpitations, orthopnea and PND.   Musculoskeletal: Negative for neck pain.       Objective   Physical Exam   Constitutional: She appears well-developed and well-nourished. She is active. She does not appear ill. No distress.   Cardiovascular: Normal rate, regular rhythm and normal heart sounds.   Pulmonary/Chest: Effort normal and breath sounds normal. She has no decreased breath sounds. She has no wheezes. She has no rhonchi. She has no rales.   Neurological: She is alert.   Nursing note and vitals reviewed.        Assessment/Plan   Problems  Addressed this Visit        Cardiovascular and Mediastinum    HLD (hyperlipidemia)    Hypertension    Relevant Orders    Basic metabolic panel       Endocrine    Hypothyroidism - Primary    Relevant Orders    TSH Rfx On Abnormal To Free T4       Musculoskeletal and Integument    Osteoporosis        1. Hypothyroidism, unspecified type  Last thyroid labs 1/2017. Recheck thyroid labs today, adjust medication dose as necessary.    - TSH Rfx On Abnormal To Free T4    2. Essential hypertension  Controlled, continue current medication.  Check labs today.  Follow up in office for blood pressure recheck in 6 months.    - Basic metabolic panel    3. Hyperlipidemia, unspecified hyperlipidemia type  Control, last fasting lipid panel 6 2018.  We'll recheck at next visit.    4. Osteoporosis, unspecified osteoporosis type, unspecified pathological fracture presence

## 2019-02-06 LAB
BUN SERPL-MCNC: 15 MG/DL (ref 8–23)
BUN/CREAT SERPL: 19.7 (ref 7–25)
CALCIUM SERPL-MCNC: 9.4 MG/DL (ref 8.6–10.5)
CHLORIDE SERPL-SCNC: 96 MMOL/L (ref 98–107)
CO2 SERPL-SCNC: 32.8 MMOL/L (ref 22–29)
CREAT SERPL-MCNC: 0.76 MG/DL (ref 0.57–1)
GLUCOSE SERPL-MCNC: 85 MG/DL (ref 65–99)
POTASSIUM SERPL-SCNC: 3.8 MMOL/L (ref 3.5–5.2)
SODIUM SERPL-SCNC: 142 MMOL/L (ref 136–145)
TSH SERPL DL<=0.005 MIU/L-ACNC: 4.11 MIU/ML (ref 0.27–4.2)

## 2019-04-09 ENCOUNTER — OFFICE VISIT (OUTPATIENT)
Dept: ONCOLOGY | Facility: CLINIC | Age: 84
End: 2019-04-09

## 2019-04-09 ENCOUNTER — TELEPHONE (OUTPATIENT)
Dept: ONCOLOGY | Facility: CLINIC | Age: 84
End: 2019-04-09

## 2019-04-09 ENCOUNTER — LAB (OUTPATIENT)
Dept: LAB | Facility: HOSPITAL | Age: 84
End: 2019-04-09

## 2019-04-09 VITALS
WEIGHT: 153.9 LBS | SYSTOLIC BLOOD PRESSURE: 136 MMHG | DIASTOLIC BLOOD PRESSURE: 86 MMHG | OXYGEN SATURATION: 98 % | RESPIRATION RATE: 16 BRPM | TEMPERATURE: 98.4 F | HEART RATE: 101 BPM | HEIGHT: 67 IN | BODY MASS INDEX: 24.16 KG/M2

## 2019-04-09 DIAGNOSIS — M81.0 OSTEOPOROSIS, UNSPECIFIED OSTEOPOROSIS TYPE, UNSPECIFIED PATHOLOGICAL FRACTURE PRESENCE: ICD-10-CM

## 2019-04-09 DIAGNOSIS — C50.919 MALIGNANT NEOPLASM OF FEMALE BREAST, UNSPECIFIED ESTROGEN RECEPTOR STATUS, UNSPECIFIED LATERALITY, UNSPECIFIED SITE OF BREAST (HCC): Primary | ICD-10-CM

## 2019-04-09 DIAGNOSIS — C50.919 MALIGNANT NEOPLASM OF FEMALE BREAST, UNSPECIFIED ESTROGEN RECEPTOR STATUS, UNSPECIFIED LATERALITY, UNSPECIFIED SITE OF BREAST (HCC): ICD-10-CM

## 2019-04-09 DIAGNOSIS — Z12.39 SCREENING BREAST EXAMINATION: ICD-10-CM

## 2019-04-09 LAB
ALBUMIN SERPL-MCNC: 4 G/DL (ref 3.5–5.2)
ALBUMIN/GLOB SERPL: 1.2 G/DL (ref 1.1–2.4)
ALP SERPL-CCNC: 96 U/L (ref 38–116)
ALT SERPL W P-5'-P-CCNC: 16 U/L (ref 0–33)
ANION GAP SERPL CALCULATED.3IONS-SCNC: 11.8 MMOL/L
AST SERPL-CCNC: 22 U/L (ref 0–32)
BASOPHILS # BLD AUTO: 0.08 10*3/MM3 (ref 0–0.2)
BASOPHILS NFR BLD AUTO: 1.5 % (ref 0–1.5)
BILIRUB SERPL-MCNC: 0.6 MG/DL (ref 0.2–1.2)
BUN BLD-MCNC: 12 MG/DL (ref 6–20)
BUN/CREAT SERPL: 17.6 (ref 7.3–30)
CALCIUM SPEC-SCNC: 9.5 MG/DL (ref 8.5–10.2)
CHLORIDE SERPL-SCNC: 98 MMOL/L (ref 98–107)
CO2 SERPL-SCNC: 31.2 MMOL/L (ref 22–29)
CREAT BLD-MCNC: 0.68 MG/DL (ref 0.6–1.1)
DEPRECATED RDW RBC AUTO: 48.9 FL (ref 37–54)
EOSINOPHIL # BLD AUTO: 0.18 10*3/MM3 (ref 0–0.4)
EOSINOPHIL NFR BLD AUTO: 3.3 % (ref 0.3–6.2)
ERYTHROCYTE [DISTWIDTH] IN BLOOD BY AUTOMATED COUNT: 13.6 % (ref 12.3–15.4)
GFR SERPL CREATININE-BSD FRML MDRD: 82 ML/MIN/1.73
GLOBULIN UR ELPH-MCNC: 3.3 GM/DL (ref 1.8–3.5)
GLUCOSE BLD-MCNC: 91 MG/DL (ref 74–124)
HCT VFR BLD AUTO: 39.6 % (ref 34–46.6)
HGB BLD-MCNC: 12.6 G/DL (ref 12–15.9)
IMM GRANULOCYTES # BLD AUTO: 0.01 10*3/MM3 (ref 0–0.05)
IMM GRANULOCYTES NFR BLD AUTO: 0.2 % (ref 0–0.5)
LYMPHOCYTES # BLD AUTO: 1.42 10*3/MM3 (ref 0.7–3.1)
LYMPHOCYTES NFR BLD AUTO: 26.2 % (ref 19.6–45.3)
MCH RBC QN AUTO: 31 PG (ref 26.6–33)
MCHC RBC AUTO-ENTMCNC: 31.8 G/DL (ref 31.5–35.7)
MCV RBC AUTO: 97.5 FL (ref 79–97)
MONOCYTES # BLD AUTO: 0.62 10*3/MM3 (ref 0.1–0.9)
MONOCYTES NFR BLD AUTO: 11.4 % (ref 5–12)
NEUTROPHILS # BLD AUTO: 3.12 10*3/MM3 (ref 1.4–7)
NEUTROPHILS NFR BLD AUTO: 57.4 % (ref 42.7–76)
NRBC BLD AUTO-RTO: 0 /100 WBC (ref 0–0)
PLATELET # BLD AUTO: 205 10*3/MM3 (ref 140–450)
PMV BLD AUTO: 11.2 FL (ref 6–12)
POTASSIUM BLD-SCNC: 3.7 MMOL/L (ref 3.5–4.7)
PROT SERPL-MCNC: 7.3 G/DL (ref 6.3–8)
RBC # BLD AUTO: 4.06 10*6/MM3 (ref 3.77–5.28)
SODIUM BLD-SCNC: 141 MMOL/L (ref 134–145)
WBC NRBC COR # BLD: 5.43 10*3/MM3 (ref 3.4–10.8)

## 2019-04-09 PROCEDURE — 85025 COMPLETE CBC W/AUTO DIFF WBC: CPT | Performed by: INTERNAL MEDICINE

## 2019-04-09 PROCEDURE — 99214 OFFICE O/P EST MOD 30 MIN: CPT | Performed by: INTERNAL MEDICINE

## 2019-04-09 PROCEDURE — 36415 COLL VENOUS BLD VENIPUNCTURE: CPT | Performed by: INTERNAL MEDICINE

## 2019-04-09 PROCEDURE — 80053 COMPREHEN METABOLIC PANEL: CPT | Performed by: INTERNAL MEDICINE

## 2019-04-09 NOTE — TELEPHONE ENCOUNTER
----- Message from Marla Lyons MD sent at 4/9/2019 10:50 AM EDT -----  Please obtain DEXA scan on this patient in 6 months prior to MD appointment.  Please notify the this to the patient and let her know that she is due for DEXA scan  Marla Lyons MD

## 2019-04-09 NOTE — PROGRESS NOTES
Subjective .     REASONS FOR FOLLOWUP:    [id number] invasive ductal carcinoma in the left breast. The mammogram showed the lesion to be 1.3 x 1.7 cm. She had a digital mammogram that showed a 1.8 cm mass in the left breast at 6 o'clock position. The biopsy was consistent wi t h invasive ductal carcinoma, grade 2, [place] score of 6 out of 9, ER positive greater than 90%, RI positive greater than 90%, HER-2/shannon 2+ by immunohistochemistry but negative by FISH. She is status post lumpectomy which showed a 1.3 x 1.7 cm mass, gr a de 2, [place] score of 6 out of 9 and there was a component of DCIS. A second focus of DCIS was present along the superior margin away from the actual mass and the superior margin was involved. As a result she underwent re-excision and this was negati ve. She had 3 lymph nodes removed and there was macrometastasis in 1 lymph node which was about 0.4 cm. Currently the patient refuses chemotherapy and is going to be started on hormonal treatment with Arimidex.      HISTORY OF PRESENT ILLNESS:  The patient is a 87 y.o. year old female who is here for follow-up with the above-mentioned history.    History of Present Illness     The patient is an elderly female, currently here for followup. She is on Arimidex for her [id number] invasive ductal carcinoma of the left breast.  Given her age patient had refused chemotherapy and currently is just on Arimidex.  She is tolerating it very well.  She started Arimidex since June 2014.  She is 4 years out.  She does not have much of arthralgias.  She is on Fosamax for osteopenia.  Her last bone density was in July 2017.    Patient is due for bone density in July 2019.    She had a mammogram November 2018 and have reviewed the mammogram.  It is negative.  She has minimal joint pains with Arimidex and tolerating very well.  She is 5 years out.  We will continue Arimidex since she has tolerated it so well.    She tells me her daughter Lesley Gonzales has been  recently diagnosed with breast cancer and has had recent surgery by Dr. Liriano.  Prior to this there was no family history of breast cancer at all.      PAST MEDICAL HISTORY:   1. Significant for high cholesterol.   2. Hyperthyroidism.   3. Depression.   4. History of hypertension.  5.    PAST SURGICAL HISTORY: Three C-sections.     OB/GYN HISTORY: She started menstrual periods at age 14. She attained menopause around 50. She is  3, para 3, no miscarriages. She was 23 years old when she had her first child and 29 when she had the last one. Her children are 59, 58 and 54 years old. Two girls and 1 boy.     ONCOLOGIC HISTORY      ONCOLOGIC HISTORY: The patient is an 82-year-old female who likes to be called Tati who has noticed a bump in the left breast. As a result, she went to her primary care physician and mammogram was ordered. On 2014 she underwent mammogram at [facility] and the mammogram showed evidence of a 1.3 x 0.7 x 0.9 cm irregular hypoechoic mass that extends into the skin and in the left breast at the 7 o'clock position 11 cm from the nipple. As a result a biopsy was recommended. The mammogra m on the right was negative. She underwent a bilateral digital mammogram which showed that she had scattered fibroglandular densities throughout both breasts within the posterior one-third of the left breast at the 6 o'clock position . At the inframammary fold there was an irregular mass that measured 1.8 cm in greatest dimension with spiculation and architectural distortion noted. No abnormality was seen on the right breast. Mildly prominent left axillary lymph node was noted. Ultrasound was done which showed that there was a 1.3 x 0.7 x 0.9 cm irregular mass that extended into the skin. As a result, the patient underwent ultrasound-guided biopsy on 2014. The pathology, accession #[id number]. Pathology was consistent with invasive mammary carcinoma d uctal type with focal  associated ductal carcinoma in situ. The histologic grade was 2, [place] score was 6 out of 9 and the maximal span of invasive carcinoma in the core was 8 mm. The estrogen receptors were done and was greater than 90%, progestero n e receptor was 90%, HER-2 by immunohistochemistry was 2+ but by FISH HER/2 ratio was 1.4 which is negative. Subsequently, the patient was referred to Dr. Bagley and MRI of the breast was performed on 05/02/2014 and MRI showed that within the left breas t at 6 o'clock position in the posterior one-third near the inframammary fold there was an irregular enhancing mass with internal metallic clip. There was linear enhancement extending anteriorly away from the mass. The mass itself measured 2 cm in the sup e rior to inferior dimension, 2 cm in anterior to posterior dimension and 1.9 cm in the medial to lateral dimension and extending anteriorly away from the mass and contiguous with the mass is a linear clump enhancement that measured about 2.5 cm in the ant e rior posterior dimension. This brings the greatest dimension of the mass and adjacent near clump enhancement to be a total of 4.4 cm. There were no other areas that were suspicious. The right breast was negative. As a result the patient underwent surger y . She underwent left breast lumpectomy with sentinel lymph node sampling on 05/22/2014. The pathology accession number is [id number]. Pathology is consistent with invasive ductal carcinoma intermediate grade, Kallie score 6, tumor size 1.5 x 1.3 x 1.2 c m. 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 Intermediate nuclear grade minor component margins were focally involved by DCIS of the superior margin. There is a focus of intermediate grade DCIS located approximately 1.3 cm from the invasive carcinoma, which involves the superior margin and distance of invasive carcinoma from the closest margin was 1 mm superiorly and distance of in situ carcinoma from the closest margins was  involved superiorly. There were 3 lymph nodes removed. There was micrometast a sis in 1 lymph node and the size of the micrometastasis was 0.4 cm and it is a [id number] invasive ductal carcinoma. There was tumor invasion of dermis present. The patient then had to undergo a second surgery on 2014 because of positive margin. She u nderwent re-excision of the superior margin of the left breast. The pathology accession number is [id number] and the patient underwent a left breast lumpectomy re-excision. Focal non-atypical ductal hyperplasia, extensive fat necrosis with fibrosis and mix e d inflammation, no atypia. The patient then was referred here for the evaluation of options of treatment. She has already seen Dr. Pat Sage yesterday with plans of starting radiation on Monday. She has healed up from the surgery. She is here to disc uss options of further treatment from us. Currently she has no complaints except for a cough, which is chronic, which is likely secondary to allergies as the cough gets worse during the allergy season.    Patient has been on Arimidex since 2014 with olive ns to continue a total of 5 years.    Radiation between 2014 to 2014.    The 2015 mammogram is benign. We will continue Arimidex.    2016 mammogram negative.    MEDICATIONS: The current medication list was reviewed with the patient and updates in the EMR this date per the medical assistant. Medication dosages and frequencies were confirmed to be accurate.     ALLERGIES: Not allergic to any medications.     SOCIAL HISTORY: She is  and lives with her . She is very much into gardening and reading. She does not smoke. She does not drink alcohol. She has no history of any previous blood transfusions  .   FAMILY HISTORY: Father  of a heart attack at 69. Mother had colon cancer and  of colon cancer at 76. She has 1 brother who is 77 and in good health. There is no other family history of breast  cancer.             Current Outpatient Medications on File Prior to Visit   Medication Sig Dispense Refill   • alendronate (FOSAMAX) 35 MG tablet TAKE 1 TABLET EVERY 7 DAYS (SUBSTITUTED FOR FOSAMAX) 12 tablet 0   • amitriptyline (ELAVIL) 25 MG tablet Take 1 tablet by mouth every night at bedtime. 90 tablet 3   • anastrozole (ARIMIDEX) 1 MG tablet TAKE 1 TABLET EVERY DAY 90 tablet 1   • atorvastatin (LIPITOR) 10 MG tablet TAKE 1 TABLET EVERY DAY 90 tablet 1   • Azelastine-Fluticasone 137-50 MCG/ACT suspension 2 sprays into each nostril 2 (Two) Times a Day. 1 bottle 1   • Calcium Carb-Cholecalciferol (CALCIUM 1000 + D) 1000-800 MG-UNIT tablet Take  by mouth.     • chlorhexidine (PERIDEX) 0.12 % solution      • hydrochlorothiazide (HYDRODIURIL) 25 MG tablet TAKE 1 TABLET EVERY DAY 90 tablet 1   • levothyroxine (SYNTHROID, LEVOTHROID) 75 MCG tablet TAKE 1 TABLET EVERY DAY 90 tablet 1   • Multiple Vitamins-Minerals (MULTIVITAMIN ADULT PO) Take  by mouth.       No current facility-administered medications on file prior to visit.          Review of Systems   Constitutional: Negative for appetite change, chills, diaphoresis, fatigue, fever and unexpected weight change.   HENT: Negative for hearing loss, sore throat and trouble swallowing.    Respiratory: Negative for cough, chest tightness, shortness of breath and wheezing.    Cardiovascular: Negative for chest pain, palpitations and leg swelling.   Gastrointestinal: Negative for abdominal distention, abdominal pain, constipation, diarrhea, nausea and vomiting.   Genitourinary: Negative for dysuria, frequency, hematuria and urgency.   Musculoskeletal: Negative for joint swelling.        No muscle weakness.   Skin: Negative for rash and wound.   Neurological: Negative for seizures, syncope, speech difficulty, weakness, numbness and headaches.   Hematological: Negative for adenopathy. Does not bruise/bleed easily.   Psychiatric/Behavioral: Positive for sleep disturbance  "(04/09/19). Negative for behavioral problems, confusion and suicidal ideas. The patient is nervous/anxious (04/09/19).      Objective      Vitals:    04/09/19 0956   BP: 136/86   Pulse: 101   Resp: 16   Temp: 98.4 °F (36.9 °C)   TempSrc: Oral   SpO2: 98%   Weight: 69.8 kg (153 lb 14.4 oz)   Height: 170.2 cm (67.01\")   PainSc: 0-No pain     Current Status 4/9/2019   ECOG score 0       Physical Exam       GENERAL:  Well-developed, well-nourished in no acute distress.   NECK:  Supple with good range of motion; no thyromegaly or masses, no JVD.  LYMPHATICS:  No cervical, supraclavicular, axillary or inguinal adenopathy.  CHEST:  Lungs clear to auscultation. Good airflow.  BREASTS: Right and left breast: no breast masses, no axillary adenopthy, no nipple discharge. No supraclavicular adenopathy.  No skin changes, no nipple discharge    CARDIAC:  Regular rate and rhythm without murmurs, rubs or gallops. Normal S1,S2.  ABDOMEN:  Soft, nontender with no hepatosplenomegaly or masses.  EXTREMITIES:  No clubbing, cyanosis or edema.  NEUROLOGICAL:  Cranial Nerves II-XII grossly intact.  No focal neurological deficits.  PSYCHIATRIC:  Normal affect and mood.          RECENT LABS:  Hematology WBC   Date Value Ref Range Status   04/09/2019 5.43 3.40 - 10.80 10*3/mm3 Final     RBC   Date Value Ref Range Status   04/09/2019 4.06 3.77 - 5.28 10*6/mm3 Final     Hemoglobin   Date Value Ref Range Status   04/09/2019 12.6 12.0 - 15.9 g/dL Final     Hematocrit   Date Value Ref Range Status   04/09/2019 39.6 34.0 - 46.6 % Final     Platelets   Date Value Ref Range Status   04/09/2019 205 140 - 450 10*3/mm3 Final        Assessment/Plan      1. This is a patient with history of T1N1MX invasive ductal carcinoma of the left breast status post surgery, status post radiation, currently she is tolerating Arimidex.      2. Mild arthralgias related to Arimidex.      3. Osteopenia for which she is taking calcium, vitamin D and fosomax.      4. Our " plan is to continue Arimidex 1 mg daily. She is 5  years into treatment.  Continue Arimidex    5. We will plan to bring her back in 6 months with CBC, CMP, LDH and we will get a bone density prior to that.      6.  Obtain DEXA scan 1 week prior to MD appointment    MD Yudi Gan William P., MD

## 2019-04-23 DIAGNOSIS — E78.5 HYPERLIPIDEMIA, UNSPECIFIED HYPERLIPIDEMIA TYPE: ICD-10-CM

## 2019-04-23 DIAGNOSIS — R03.0 ELEVATED BLOOD PRESSURE READING WITHOUT DIAGNOSIS OF HYPERTENSION: ICD-10-CM

## 2019-04-23 DIAGNOSIS — E03.9 ADULT HYPOTHYROIDISM: ICD-10-CM

## 2019-04-23 RX ORDER — ATORVASTATIN CALCIUM 10 MG/1
10 TABLET, FILM COATED ORAL DAILY
Qty: 90 TABLET | Refills: 1 | Status: SHIPPED | OUTPATIENT
Start: 2019-04-23 | End: 2020-09-09

## 2019-04-23 RX ORDER — HYDROCHLOROTHIAZIDE 25 MG/1
25 TABLET ORAL DAILY
Qty: 90 TABLET | Refills: 1 | Status: SHIPPED | OUTPATIENT
Start: 2019-04-23 | End: 2019-09-25 | Stop reason: SDUPTHER

## 2019-04-23 RX ORDER — LEVOTHYROXINE SODIUM 0.07 MG/1
75 TABLET ORAL DAILY
Qty: 90 TABLET | Refills: 1 | Status: SHIPPED | OUTPATIENT
Start: 2019-04-23 | End: 2019-10-16 | Stop reason: SDUPTHER

## 2019-04-25 RX ORDER — ANASTROZOLE 1 MG/1
TABLET ORAL
Qty: 90 TABLET | Refills: 1 | Status: SHIPPED | OUTPATIENT
Start: 2019-04-25 | End: 2020-02-10

## 2019-06-28 ENCOUNTER — TELEPHONE (OUTPATIENT)
Dept: INTERNAL MEDICINE | Age: 84
End: 2019-06-28

## 2019-06-28 RX ORDER — ALENDRONATE SODIUM 35 MG/1
35 TABLET ORAL
Qty: 12 TABLET | Refills: 3 | Status: SHIPPED | OUTPATIENT
Start: 2019-06-28 | End: 2020-09-09

## 2019-06-28 NOTE — TELEPHONE ENCOUNTER
Pt called and needs refill on alendronate 35mg sent to [facility].    Pls advise,    [phone]

## 2019-08-07 ENCOUNTER — HOSPITAL ENCOUNTER (EMERGENCY)
Facility: HOSPITAL | Age: 84
Discharge: HOME | End: 2019-08-07
Attending: EMERGENCY MEDICINE | Admitting: EMERGENCY MEDICINE

## 2019-08-07 ENCOUNTER — OFFICE VISIT (OUTPATIENT)
Dept: INTERNAL MEDICINE | Age: 84
End: 2019-08-07

## 2019-08-07 VITALS
TEMPERATURE: 98.3 F | DIASTOLIC BLOOD PRESSURE: 93 MMHG | WEIGHT: 146.6 LBS | OXYGEN SATURATION: 96 % | HEIGHT: 67 IN | HEART RATE: 101 BPM | BODY MASS INDEX: 23.01 KG/M2 | SYSTOLIC BLOOD PRESSURE: 152 MMHG | RESPIRATION RATE: 18 BRPM

## 2019-08-07 VITALS
SYSTOLIC BLOOD PRESSURE: 124 MMHG | BODY MASS INDEX: 22.6 KG/M2 | DIASTOLIC BLOOD PRESSURE: 72 MMHG | HEIGHT: 67 IN | HEART RATE: 95 BPM | OXYGEN SATURATION: 98 % | WEIGHT: 144 LBS | TEMPERATURE: 97 F

## 2019-08-07 DIAGNOSIS — I10 ESSENTIAL HYPERTENSION: ICD-10-CM

## 2019-08-07 DIAGNOSIS — Z86.79 HISTORY OF HYPERTENSION: ICD-10-CM

## 2019-08-07 DIAGNOSIS — R06.09 DYSPNEA ON EXERTION: ICD-10-CM

## 2019-08-07 DIAGNOSIS — I48.91 ATRIAL FIBRILLATION WITH RAPID VENTRICULAR RESPONSE (HCC): Primary | ICD-10-CM

## 2019-08-07 DIAGNOSIS — R07.89 SENSATION OF CHEST PRESSURE: ICD-10-CM

## 2019-08-07 DIAGNOSIS — E78.5 HYPERLIPIDEMIA, UNSPECIFIED HYPERLIPIDEMIA TYPE: ICD-10-CM

## 2019-08-07 DIAGNOSIS — I48.91 ATRIAL FIBRILLATION, UNSPECIFIED TYPE (HCC): Primary | ICD-10-CM

## 2019-08-07 LAB
ANION GAP SERPL CALCULATED.3IONS-SCNC: 12 MMOL/L (ref 5–15)
BASOPHILS # BLD AUTO: 0.05 10*3/MM3 (ref 0–0.2)
BASOPHILS NFR BLD AUTO: 0.7 % (ref 0–1.5)
BUN BLD-MCNC: 14 MG/DL (ref 8–23)
BUN/CREAT SERPL: 20.9 (ref 7–25)
CALCIUM SPEC-SCNC: 9.4 MG/DL (ref 8.6–10.5)
CHLORIDE SERPL-SCNC: 94 MMOL/L (ref 98–107)
CO2 SERPL-SCNC: 31 MMOL/L (ref 22–29)
CREAT BLD-MCNC: 0.67 MG/DL (ref 0.57–1)
DEPRECATED RDW RBC AUTO: 49.2 FL (ref 37–54)
EOSINOPHIL # BLD AUTO: 0.1 10*3/MM3 (ref 0–0.4)
EOSINOPHIL NFR BLD AUTO: 1.5 % (ref 0.3–6.2)
ERYTHROCYTE [DISTWIDTH] IN BLOOD BY AUTOMATED COUNT: 14 % (ref 12.3–15.4)
GFR SERPL CREATININE-BSD FRML MDRD: 83 ML/MIN/1.73
GLUCOSE BLD-MCNC: 84 MG/DL (ref 65–99)
HCT VFR BLD AUTO: 41.9 % (ref 34–46.6)
HGB BLD-MCNC: 13.3 G/DL (ref 12–15.9)
HOLD SPECIMEN: NORMAL
HOLD SPECIMEN: NORMAL
IMM GRANULOCYTES # BLD AUTO: 0.02 10*3/MM3 (ref 0–0.05)
IMM GRANULOCYTES NFR BLD AUTO: 0.3 % (ref 0–0.5)
LYMPHOCYTES # BLD AUTO: 1.34 10*3/MM3 (ref 0.7–3.1)
LYMPHOCYTES NFR BLD AUTO: 20 % (ref 19.6–45.3)
MAGNESIUM SERPL-MCNC: 2.4 MG/DL (ref 1.6–2.4)
MCH RBC QN AUTO: 30.4 PG (ref 26.6–33)
MCHC RBC AUTO-ENTMCNC: 31.7 G/DL (ref 31.5–35.7)
MCV RBC AUTO: 95.7 FL (ref 79–97)
MONOCYTES # BLD AUTO: 0.84 10*3/MM3 (ref 0.1–0.9)
MONOCYTES NFR BLD AUTO: 12.6 % (ref 5–12)
NEUTROPHILS # BLD AUTO: 4.34 10*3/MM3 (ref 1.7–7)
NEUTROPHILS NFR BLD AUTO: 64.9 % (ref 42.7–76)
NRBC BLD AUTO-RTO: 0 /100 WBC (ref 0–0.2)
PLATELET # BLD AUTO: 214 10*3/MM3 (ref 140–450)
PMV BLD AUTO: 11.9 FL (ref 6–12)
POTASSIUM BLD-SCNC: 3.1 MMOL/L (ref 3.5–5.2)
RBC # BLD AUTO: 4.38 10*6/MM3 (ref 3.77–5.28)
SODIUM BLD-SCNC: 137 MMOL/L (ref 136–145)
TROPONIN T SERPL-MCNC: <0.01 NG/ML (ref 0–0.03)
TSH SERPL DL<=0.05 MIU/L-ACNC: 3.44 MIU/ML (ref 0.27–4.2)
WBC NRBC COR # BLD: 6.69 10*3/MM3 (ref 3.4–10.8)
WHOLE BLOOD HOLD SPECIMEN: NORMAL
WHOLE BLOOD HOLD SPECIMEN: NORMAL

## 2019-08-07 PROCEDURE — 93010 ELECTROCARDIOGRAM REPORT: CPT | Performed by: INTERNAL MEDICINE

## 2019-08-07 PROCEDURE — 85025 COMPLETE CBC W/AUTO DIFF WBC: CPT | Performed by: EMERGENCY MEDICINE

## 2019-08-07 PROCEDURE — 84443 ASSAY THYROID STIM HORMONE: CPT | Performed by: EMERGENCY MEDICINE

## 2019-08-07 PROCEDURE — 84484 ASSAY OF TROPONIN QUANT: CPT | Performed by: EMERGENCY MEDICINE

## 2019-08-07 PROCEDURE — 99214 OFFICE O/P EST MOD 30 MIN: CPT | Performed by: NURSE PRACTITIONER

## 2019-08-07 PROCEDURE — 80048 BASIC METABOLIC PNL TOTAL CA: CPT | Performed by: EMERGENCY MEDICINE

## 2019-08-07 PROCEDURE — 99283 EMERGENCY DEPT VISIT LOW MDM: CPT

## 2019-08-07 PROCEDURE — 93000 ELECTROCARDIOGRAM COMPLETE: CPT | Performed by: NURSE PRACTITIONER

## 2019-08-07 PROCEDURE — 93005 ELECTROCARDIOGRAM TRACING: CPT | Performed by: EMERGENCY MEDICINE

## 2019-08-07 PROCEDURE — 83735 ASSAY OF MAGNESIUM: CPT | Performed by: EMERGENCY MEDICINE

## 2019-08-07 RX ORDER — POTASSIUM CHLORIDE 750 MG/1
40 CAPSULE, EXTENDED RELEASE ORAL ONCE
Status: COMPLETED | OUTPATIENT
Start: 2019-08-07 | End: 2019-08-07

## 2019-08-07 RX ADMIN — RIVAROXABAN 20 MG: 20 TABLET, FILM COATED ORAL at 14:01

## 2019-08-07 RX ADMIN — METOPROLOL TARTRATE 25 MG: 25 TABLET ORAL at 14:01

## 2019-08-07 RX ADMIN — POTASSIUM CHLORIDE 40 MEQ: 750 CAPSULE, EXTENDED RELEASE ORAL at 14:01

## 2019-08-07 NOTE — PROGRESS NOTES
"Angela Quinn / 87 y.o. / female  Encounter Date: 08/07/2019    ASSESSMENT & PLAN:    Problem List Items Addressed This Visit        Cardiovascular and Mediastinum    HLD (hyperlipidemia)    Relevant Medications    atorvastatin (LIPITOR) 10 MG tablet    Hypertension    Relevant Medications    hydrochlorothiazide (HYDRODIURIL) 25 MG tablet      Other Visit Diagnoses     Atrial fibrillation with rapid ventricular response (CMS/HCC)    -  Primary    Dyspnea on exertion        Relevant Orders    ECG 12 Lead    Sensation of chest pressure        Relevant Orders    ECG 12 Lead        Orders Placed This Encounter   Procedures   • ECG 12 Lead     No orders of the defined types were placed in this encounter.      Summary/Discussion:  1. EKG ordered in office exhibited: afib with RVR, rate 107. In combination with complaints of chest pressure and DIA x 3 months, patient has been instructed to go to the ER for further evaluation and treatment plan. She was taken to the ED with EKG reports via wheelchair. She will need antithrombotic therapy and is a possible candidate for cardioversion.     Return if symptoms worsen or fail to improve, for Follow up after ED evaluation as indicated.  ________________________________________________________________    VITALS:    Visit Vitals  /72   Pulse 95   Temp 97 °F (36.1 °C) (Temporal)   Ht 170.2 cm (67.01\")   Wt 65.3 kg (144 lb)   SpO2 98%   BMI 22.55 kg/m²       BP Readings from Last 3 Encounters:   08/07/19 124/72   04/09/19 136/86   02/05/19 138/84     Wt Readings from Last 3 Encounters:   08/07/19 65.3 kg (144 lb)   04/09/19 69.8 kg (153 lb 14.4 oz)   02/05/19 69.9 kg (154 lb 3.2 oz)      Body mass index is 22.55 kg/m².    CC: Main reason(s) for today's visit: Hypothyroidism and Hypertension      HPI    Patient is a 87 y.o. female who is here for follow up.  She is a new patient to me.  These are new problems to me. I am providing cross coverage for her PCP, Helen Dean while " "she is on leave.   She presents today for six-month follow-up on essential hypertension, hyperlipidemia, hypothyroidism maintenance and monitoring.  She is due for fasting lipid panel and thyroid levels today.  During exam she reports to me new onset of dyspnea on exertion and sensation of chest discomfort including the upper chest and bilateral upper arms for the past 1 to 3 months.  She states that she was ignoring the symptoms early on so she is not exactly sure of the onset time.  Dyspnea occurs when climbing 9 stairs in her house, or even walking on flat ground.  She describes the dyspnea as \"needing more oxygen\" and that she is \"huffing and puffing\" when she stops to take a break. She denies history of cardiac or respiratory conditions to her knowledge.    EKG: there are no previous tracings available for comparison, atrial fibrillation with RVR, rate 107.    Chronic essential hypertension:  Since prior visit: compliant with medication(s), does not check blood pressure at home and c/o fatigue.  Most recent in-office blood pressure readings:   BP Readings from Last 3 Encounters:   08/07/19 124/72   04/09/19 136/86   02/05/19 138/84     Chronic hyperlipidemia:  Current therapy include atorvastatin.  Due for fasting lipid panel today.   Most recent labs:   Lab Results   Component Value Date     (H) 06/25/2018    LDL 82 01/04/2017    LDL 78 10/18/2016    HDL 63 (H) 06/25/2018    HDL 67 (H) 01/04/2017    TRIG 85 06/25/2018       Patient Care Team:  Helen Dean APRN as PCP - General (Internal Medicine)  Marla Lyons MD as PCP - Claims Attributed  Marla Lyons MD as Consulting Physician (Hematology and Oncology)  Gregor Bagley MD as Referring Physician (Breast Surgery)  Kyra Sage MD as Consulting Physician (Radiation Oncology)  Dorene Malloy MD as Consulting Physician (Ophthalmology)  Luke Linton MD as Consulting Physician " (Gastroenterology)  ____________________________________________________________________    REVIEW OF SYSTEMS    Review of Systems   Constitutional: Positive for fatigue. Negative for diaphoresis and unexpected weight change.   Eyes: Negative for photophobia and visual disturbance.   Respiratory: Positive for shortness of breath (DIA). Negative for cough and wheezing.    Cardiovascular: Negative for chest pain, palpitations and leg swelling.        SEE HPI: chest pressure sensation, upper chest and BRIAN upper arms with stressors, 3-5 min duration, self-resolves.    Gastrointestinal: Negative.  Negative for nausea.   Musculoskeletal: Negative.    Skin: Negative.    Neurological: Negative for dizziness, weakness, light-headedness, numbness and headaches.   Psychiatric/Behavioral: Positive for agitation. The patient is nervous/anxious.        PHYSICAL EXAMINATION    Physical Exam   Constitutional: She is oriented to person, place, and time. She appears well-developed and well-nourished. No distress.   Eyes: Conjunctivae and EOM are normal. Pupils are equal, round, and reactive to light.   Cardiovascular: Intact distal pulses. An irregular rhythm present. Tachycardia present.   Pulmonary/Chest: Effort normal and breath sounds normal. No respiratory distress. She has no wheezes.   Abdominal: Soft. Bowel sounds are normal. She exhibits no distension.   Musculoskeletal: Normal range of motion.   Neurological: She is alert and oriented to person, place, and time.   Skin: Skin is warm and dry. She is not diaphoretic.   Psychiatric: She has a normal mood and affect.   Anxious about health and significant stress about family issues   Vitals reviewed.    REVIEWED DATA:    Labs:   Lab Results   Component Value Date     04/09/2019    K 3.7 04/09/2019    AST 22 04/09/2019    ALT 16 04/09/2019    BUN 12 04/09/2019    CREATININE 0.68 04/09/2019    CREATININE 0.76 02/05/2019    CREATININE 0.69 10/23/2018    EGFRIFNONA 82  04/09/2019    EGFRIFAFRI 87 02/05/2019       Lab Results   Component Value Date    GLUCOSE 91 04/09/2019    GLUCOSE 128 (H) 10/23/2018    GLUCOSE 103 05/08/2018       Lab Results   Component Value Date     (H) 06/25/2018    LDL 82 01/04/2017    LDL 78 10/18/2016    HDL 63 (H) 06/25/2018    TRIG 85 06/25/2018       Lab Results   Component Value Date    TSH 4.11 02/05/2019          Lab Results   Component Value Date    WBC 5.43 04/09/2019    HGB 12.6 04/09/2019    HGB 12.6 10/23/2018    HGB 12.8 05/08/2018     04/09/2019         Imaging:      Medical Tests:      Summary of old records / correspondence / consultant report:      Request outside records:   ______________________________________________________________________    ALLERGIES  No Known Allergies     MEDICATIONS  Current Outpatient Medications on File Prior to Visit   Medication Sig   • alendronate (FOSAMAX) 35 MG tablet Take 1 tablet by mouth Every 7 (Seven) Days.   • amitriptyline (ELAVIL) 25 MG tablet Take 1 tablet by mouth every night at bedtime.   • anastrozole (ARIMIDEX) 1 MG tablet TAKE 1 TABLET EVERY DAY   • atorvastatin (LIPITOR) 10 MG tablet Take 1 tablet by mouth Daily.   • Azelastine-Fluticasone 137-50 MCG/ACT suspension 2 sprays into each nostril 2 (Two) Times a Day.   • Calcium Carb-Cholecalciferol (CALCIUM 1000 + D) 1000-800 MG-UNIT tablet Take  by mouth.   • chlorhexidine (PERIDEX) 0.12 % solution    • hydrochlorothiazide (HYDRODIURIL) 25 MG tablet Take 1 tablet by mouth Daily.   • levothyroxine (SYNTHROID, LEVOTHROID) 75 MCG tablet Take 1 tablet by mouth Daily.   • Multiple Vitamins-Minerals (MULTIVITAMIN ADULT PO) Take  by mouth.     No current facility-administered medications on file prior to visit.        PFSH:     The following portions of the patient's history were reviewed and updated as appropriate: Allergies / Current Medications / Past Medical History / Surgical History / Social History / Family History    PROBLEM LIST    Patient Active Problem List   Diagnosis   • Breast CA (CMS/HCC)   • Hypothyroidism   • HLD (hyperlipidemia)   • Routine health maintenance   • Imbalance   • Altered bowel habits   • Colon polyp, hyperplastic   • Colon polyps   • Depression   • Diverticulitis of intestine   • Osteoarthritis   • Hearing loss   • Hypertension   • Obstructive sleep apnea syndrome   • Osteoporosis   • Urinary incontinence   • Medicare annual wellness visit, initial   • Encounter for immunization   • Peripheral polyneuropathy   • Vaginal discharge   • Osteopenia of both lower legs   • Fibroids, submucosal       PAST MEDICAL HISTORY  Past Medical History:   Diagnosis Date   • Breast cancer (CMS/HCC)    • Cancer (CMS/HCC)     Left breast cancer, [id number] invasive ductal carcinoma   • Cataract    • Colon polyp, hyperplastic 2016   • Depression    • Disease of thyroid gland    • Glaucoma    • [place] (hard of hearing)    • Hyperlipidemia    • Hypertension    • Osteopenia    • Radiation    • Sleep apnea        SURGICAL HISTORY  Past Surgical History:   Procedure Laterality Date   • BREAST BIOPSY     • BREAST LUMPECTOMY      left side   • CATARACT EXTRACTION     •  SECTION      x3   • COLONOSCOPY     • COLONOSCOPY N/A 2016    Procedure:  colonoscopy into cecum with saline injection, hot snare polypectomy, APC used in ascending polyp area;  Surgeon: Luke Linton MD;  Location: [facility] ENDOSCOPY;  Service:    • COLONOSCOPY N/A 2016    Procedure: COLONOSCOPY into cecum with polypectomies and methylene blue inj and saline inj. (5 cc.);  Surgeon: Luke Linton MD;  Location: [facility] ENDOSCOPY;  Service:    • COLONOSCOPY W/ BIOPSIES     • MYOMECTOMY  2017    Partial       SOCIAL HISTORY  Social History     Socioeconomic History   • Marital status:      Spouse name: Gregor   • Number of children: 3   • Years of education: Not on file   • Highest education level: Not on file   Occupational History   •  Occupation: Office     Employer: RETIRED   Tobacco Use   • Smoking status: Never Smoker   • Smokeless tobacco: Never Used   Substance and Sexual Activity   • Alcohol use: No   • Drug use: No   • Sexual activity: Defer   Social History Narrative           FAMILY HISTORY  Family History   Problem Relation Age of Onset   • Colon cancer Mother    • Cancer Mother    • Coronary artery disease Father    • Heart attack Father    • Heart disease Father    • No Known Problems Brother

## 2019-08-07 NOTE — ED PROVIDER NOTES
EMERGENCY DEPARTMENT ENCOUNTER    CHIEF COMPLAINT  Chief Complaint: SOA  History given by: pt  History limited by: nothing  Room Number: 20/20  PMD: Helen Dean NATASHA VALENTINE      HPI:  Pt is a 87 y.o. female who presents complaining of exertional SOA that started a 'few months' ago. Pt was sent to the ED from her PMD's office today. She states she has difficulty walking up stairs but can work 'all day' in her garden without difficulty. She denies cough, V/D and all other complaints at this time. She denies a hx of a-fib. She denies recent medication changes. Pt is not a smoker or drinker. She takes Lipitor for her HLD and hydrochlorothiazide her her HTN.     Duration:  'a few months'   Onset: gradual  Timing: exertional  Location: lungs  Radiation: none  Quality: exertional SOA  Intensity/Severity: mild  Progression: unchanged  Associated Symptoms: none  Aggravating Factors: exertion  Alleviating Factors: none  Previous Episodes: none  Treatment before arrival: none    PAST MEDICAL HISTORY  Active Ambulatory Problems     Diagnosis Date Noted   • Breast CA (CMS/HCC) 02/16/2016   • Hypothyroidism 02/16/2016   • HLD (hyperlipidemia) 02/16/2016   • Routine health maintenance 02/22/2016   • Imbalance 02/22/2016   • Altered bowel habits 02/22/2016   • Colon polyp, hyperplastic 06/13/2016   • Colon polyps 06/13/2016   • Depression 12/27/2016   • Diverticulitis of intestine 12/27/2016   • Osteoarthritis 12/27/2016   • Hearing loss 12/27/2016   • Hypertension 12/27/2016   • Obstructive sleep apnea syndrome 12/27/2016   • Osteoporosis 02/24/2001   • Urinary incontinence 12/27/2016   • Medicare annual wellness visit, initial 01/03/2017   • Encounter for immunization 01/03/2017   • Peripheral polyneuropathy 01/03/2017   • Vaginal discharge 06/22/2017   • Osteopenia of both lower legs 11/21/2017   • Fibroids, submucosal 08/02/2017     Resolved Ambulatory Problems     Diagnosis Date Noted   • Blood pressure elevated without history  of HTN 2016   • Cough 2017     Past Medical History:   Diagnosis Date   • Breast cancer (CMS/HCC)    • Cancer (CMS/HCC)    • Cataract    • Colon polyp, hyperplastic 2016   • Depression    • Disease of thyroid gland    • Glaucoma    • [place] (hard of hearing)    • Hyperlipidemia    • Hypertension    • Osteopenia    • Radiation    • Sleep apnea        PAST SURGICAL HISTORY  Past Surgical History:   Procedure Laterality Date   • BREAST BIOPSY     • BREAST LUMPECTOMY      left side   • CATARACT EXTRACTION     •  SECTION      x3   • COLONOSCOPY     • COLONOSCOPY N/A 2016    Procedure:  colonoscopy into cecum with saline injection, hot snare polypectomy, APC used in ascending polyp area;  Surgeon: Luke Linton MD;  Location: [facility] ENDOSCOPY;  Service:    • COLONOSCOPY N/A 2016    Procedure: COLONOSCOPY into cecum with polypectomies and methylene blue inj and saline inj. (5 cc.);  Surgeon: Luke Linton MD;  Location: [facility] ENDOSCOPY;  Service:    • COLONOSCOPY W/ BIOPSIES     • MYOMECTOMY  2017    Partial       FAMILY HISTORY  Family History   Problem Relation Age of Onset   • Colon cancer Mother    • Cancer Mother    • Coronary artery disease Father    • Heart attack Father    • Heart disease Father    • No Known Problems Brother        SOCIAL HISTORY  Social History     Socioeconomic History   • Marital status:      Spouse name: Gregor   • Number of children: 3   • Years of education: Not on file   • Highest education level: Not on file   Occupational History   • Occupation: Office     Employer: RETIRED   Tobacco Use   • Smoking status: Never Smoker   • Smokeless tobacco: Never Used   Substance and Sexual Activity   • Alcohol use: No   • Drug use: No   • Sexual activity: Defer   Social History Narrative           ALLERGIES  Patient has no known allergies.    REVIEW OF SYSTEMS  Review of Systems   Constitutional: Negative for fever.   HENT: Negative for  sore throat.    Eyes: Negative.    Respiratory: Positive for shortness of breath (exertional). Negative for cough.    Cardiovascular: Negative for chest pain.   Gastrointestinal: Negative for abdominal pain, diarrhea and vomiting.   Genitourinary: Negative for dysuria.   Musculoskeletal: Negative for neck pain.   Skin: Negative for rash.   Allergic/Immunologic: Negative.    Neurological: Negative for weakness, numbness and headaches.   Hematological: Negative.    Psychiatric/Behavioral: Negative.    All other systems reviewed and are negative.      PHYSICAL EXAM  ED Triage Vitals   Temp Heart Rate Resp BP SpO2   08/07/19 1058 08/07/19 1058 08/07/19 1058 08/07/19 1100 08/07/19 1058   98.3 °F (36.8 °C) 53 16 129/93 98 %     Physical Exam   Constitutional: She is oriented to person, place, and time and well-developed, well-nourished, and in no distress. No distress.   HENT:   Head: Normocephalic.   Mouth/Throat: Mucous membranes are normal.   Eyes: EOM are normal.   Neck: Normal range of motion.   Cardiovascular: An irregularly irregular rhythm present. Tachycardia present. Exam reveals no gallop and no friction rub.   No murmur heard.  Mild irregularly irregular tachycardia   Pulmonary/Chest: Effort normal. No respiratory distress. She has no wheezes. She has no rhonchi. She has no rales.   Abdominal: Soft. She exhibits no distension. There is no tenderness. There is no guarding.   Musculoskeletal: Normal range of motion. She exhibits no deformity.   Neurological: She is alert and oriented to person, place, and time. GCS score is 15.   moving all extremities, no focal deficits   Skin: Skin is warm and dry.   Psychiatric:   Calm, cooperative       LAB RESULTS  Lab Results (last 24 hours)     Procedure Component Value Units Date/Time    CBC & Differential [[id number]] Collected:  08/07/19 1133    Specimen:  Blood Updated:  08/07/19 1152    Narrative:       The following orders were created for panel order CBC &  Differential.  Procedure                               Abnormality         Status                     ---------                               -----------         ------                     CBC Auto Differential[[id number]]        Abnormal            Final result                 Please view results for these tests on the individual orders.    Basic Metabolic Panel [[id number]]  (Abnormal) Collected:  08/07/19 1133    Specimen:  Blood Updated:  08/07/19 1230     Glucose 84 mg/dL      BUN 14 mg/dL      Creatinine 0.67 mg/dL      Sodium 137 mmol/L      Potassium 3.1 mmol/L      Chloride 94 mmol/L      CO2 31.0 mmol/L      Calcium 9.4 mg/dL      eGFR Non African Amer 83 mL/min/1.73      BUN/Creatinine Ratio 20.9     Anion Gap 12.0 mmol/L     Narrative:       GFR Normal >60  Chronic Kidney Disease <60  Kidney Failure <15    Troponin [[id number]]  (Normal) Collected:  08/07/19 1133    Specimen:  Blood Updated:  08/07/19 1240     Troponin T <0.010 ng/mL     Narrative:       Troponin T Reference Range:  <= 0.03 ng/mL-   Negative for AMI  >0.03 ng/mL-     Abnormal for myocardial necrosis.  Clinicians would have to utilize clinical acumen, EKG, Troponin and serial changes to determine if it is an Acute Myocardial Infarction or myocardial injury due to an underlying chronic condition.     Magnesium [[id number]]  (Normal) Collected:  08/07/19 1133    Specimen:  Blood Updated:  08/07/19 1230     Magnesium 2.4 mg/dL     TSH [[id number]]  (Normal) Collected:  08/07/19 1133    Specimen:  Blood Updated:  08/07/19 1240     TSH 3.440 mIU/mL     CBC Auto Differential [[id number]]  (Abnormal) Collected:  08/07/19 1133    Specimen:  Blood Updated:  08/07/19 1152     WBC 6.69 10*3/mm3      RBC 4.38 10*6/mm3      Hemoglobin 13.3 g/dL      Hematocrit 41.9 %      MCV 95.7 fL      MCH 30.4 pg      MCHC 31.7 g/dL      RDW 14.0 %      RDW-SD 49.2 fl      MPV 11.9 fL      Platelets 214 10*3/mm3      Neutrophil % 64.9 %      Lymphocyte % 20.0 %       Monocyte % 12.6 %      Eosinophil % 1.5 %      Basophil % 0.7 %      Immature Grans % 0.3 %      Neutrophils, Absolute 4.34 10*3/mm3      Lymphocytes, Absolute 1.34 10*3/mm3      Monocytes, Absolute 0.84 10*3/mm3      Eosinophils, Absolute 0.10 10*3/mm3      Basophils, Absolute 0.05 10*3/mm3      Immature Grans, Absolute 0.02 10*3/mm3      nRBC 0.0 /100 WBC           I ordered the above labs and reviewed the results      PROCEDURES  Procedures    EKG    EKG Time: 1108  Rhythm/Rate: a-fib 108  LAD with an LAFB  Isolated PVC  No acute ischemic changes  No STEMI     Interpreted Contemporaneously by me, independently viewed  A-fib is new compared to prior May 2014 with a stable LAFB      PROGRESS AND CONSULTS      1106. Ordered labwork for workup.     1246. Ordered Micro-K.     1247. Placed call to G.     1347. Discussed case with YANDEL Worley, who would like the pt started on Xarelto.     1350. BP- 129/93 HR- 53 Temp- 98.3 °F (36.8 °C) O2 sat- 98%. Rechecked the patient who is in NAD and is resting comfortably. Informed pt of consult with YANDEL Worley, and plan to discharge the pt home with Metoprolol and Xarelto and LCG follow-up. Pt understands and agrees with the plan, all questions answered.    1353. Ordered Lopressor and Xarelto.       MEDICAL DECISION MAKING  Results were reviewed/discussed with the patient and they were also made aware of online access. Pt also made aware that some labs, such as cultures, will not be resulted during ER visit and follow up with PMD is necessary.     MDM  Number of Diagnoses or Management Options  Atrial fibrillation, unspecified type (CMS/HCC):   History of hypertension:   Diagnosis management comments: Patient referred here by primary care for new onset atrial fibrillation.  She has had some mild dyspnea with exertion, but denies any other acute symptoms.  Laboratory work-up was unremarkable today.  Patient has a [id number] of 4.  I discussed the case with Dr. Jackson  "with LCD, and he does recommend starting anticoagulation at this time due to her elevated risk as long as she is not a increased risk for falls.  After discussion with the patient, she denies any recent or recurrent falls, and is aware of the potential risks and side effects of blood thinners, but she is willing to move forward with Xarelto.  I will put her on Lopressor as well, 5 mg daily to start with, with cardiology follow-up.  I have advised her to follow-up with her primary care provider as well.  She is well-appearing, asymptomatic and is asking to leave at this time.       Amount and/or Complexity of Data Reviewed  Clinical lab tests: ordered and reviewed (Troponin <0.010)  Tests in the medicine section of CPT®: ordered and reviewed (See EKG procedure note.)  Decide to obtain previous medical records or to obtain history from someone other than the patient: yes (Epic)  Review and summarize past medical records: yes (Note from NATASHA Desirae Rishabh 8/7/2019, \"EKG ordered in office exhibited: afib with RVR, rate 107. In combination with complaints of chest pressure and DIA x 3 months, patient has been instructed to go to the ER for further evaluation and treatment plan. She was taken to the ED with EKG reports via wheelchair. She will need antithrombotic therapy and is a possible candidate for cardioversion.\")  Discuss the patient with other providers: yes (Dr Jackson, [facility])    Patient Progress  Patient progress: stable         DIAGNOSIS  Final diagnoses:   Atrial fibrillation, unspecified type (CMS/HCC)   History of hypertension       DISPOSITION  DISCHARGE    Patient discharged in stable condition.    Reviewed implications of results, diagnosis, meds, responsibility to follow up, warning signs and symptoms of possible worsening, potential complications and reasons to return to ER.    Patient/Family voiced understanding of above instructions.    Discussed plan for discharge, as there is no emergent indication for " admission. Patient referred to primary care provider for BP management due to today's BP. Pt/family is agreeable and understands need for follow up and repeat testing.  Pt is aware that discharge does not mean that nothing is wrong but it indicates no emergency is present that requires admission and they must continue care with follow-up as given below or physician of their choice.     FOLLOW-UP  [facility]  [address] [id number] [phone]    As needed, If symptoms worsen    Helen Dean, APRN  [address]  [place] [address]  [phone]    Schedule an appointment as soon as possible for a visit       [facility] CARDIOLOGY  [address]  [facility] [address] [phone]  Schedule an appointment as soon as possible for a visit   for follow up of your atrial fibrilation - call tomorrow to set up appointment         Medication List      New Prescriptions    metoprolol tartrate 25 MG tablet  Commonly known as:  LOPRESSOR  Take 1 tablet by mouth Daily.     rivaroxaban 20 MG tablet  Commonly known as:  XARELTO  Take 1 tablet by mouth Daily.        Latest Documented Vital Signs:  As of 1:57 PM  BP- 129/93 HR- 53 Temp- 98.3 °F (36.8 °C) O2 sat- 98%    --  Documentation assistance provided by paul Be for Dr Omari MD.  Information recorded by the scribe was done at my direction and has been verified and validated by me.     Gracia Be  08/07/19 7822       Perico Salcido MD  08/07/19 0374

## 2019-08-07 NOTE — DISCHARGE INSTRUCTIONS
Begin taking the new medications as prescribed, follow-up with your primary care provider and with cardiology for recheck.  Take appropriate fall precautions to limit any potential head trauma.  You are at increased risk of bleeding with the new medication, please return to the emergency department if you happen to notice any bright red blood per rectum or black/sticky stools, or for any other worsening symptoms.

## 2019-08-07 NOTE — ED NOTES
Pt to Ed with c/o DIA x weeks, states worse going up steps, has noticed worse recently. Presents in afib, new onset. Denies CP, SOA while seated in stretcher.      Valerie Irizarry RN  08/07/19 [id number]

## 2019-08-08 ENCOUNTER — EPISODE CHANGES (OUTPATIENT)
Dept: CASE MANAGEMENT | Facility: OTHER | Age: 84
End: 2019-08-08

## 2019-08-09 ENCOUNTER — EPISODE CHANGES (OUTPATIENT)
Dept: CASE MANAGEMENT | Facility: OTHER | Age: 84
End: 2019-08-09

## 2019-08-12 ENCOUNTER — PATIENT OUTREACH (OUTPATIENT)
Dept: CASE MANAGEMENT | Facility: OTHER | Age: 84
End: 2019-08-12

## 2019-08-12 NOTE — OUTREACH NOTE
"Care Plan Note      Responses   Lifestyle Goals  Have more energy, Routine follow-up with doctor(s)   Barriers  Disease education   Self Management  Other (See Comment) [Cardiology f/u for Afib education & monitoring.]   Annual Wellness Visit:   Patient Has Completed   Specific Disease Process Teaching  Heart Disease   Does patient have depression diagnosis?  No   Advanced Directives:  Patient Has   Medication Adherence  Other (see comment), Medications understood [New Rx's: Xarelto and Metoprolol for Afib.]        The main concerns and/or symptoms the patient would like to address are:  New onset Afib, seen at PCP office and sent to the ED 8/7/19.  Pt reports \"a funny feeling\" which prompts her to cough, and experiencing fatigue especially in the legs and sits down to rest. Other times she's active without the symptoms.  Pt is taking the new medications Xarelto and Metoprolol as directed, voiced understanding of purpose. Pt has dry mouth with taking HCTZ.  Discussed adequate hydration, sit and stand up slowly after being supine, and safety precautions. Pt will schedule f/u cardiology appt today. AWV is current, DXA is scheduled for 9/17/19. Pt will call GI office to schedule colonoscopy if due this year. Pt has advance directives. Declines MyChart.     Education/instruction provided by Care Coordinator:  Informed of care advisor role, contact number.     Follow Up Outreach Due:  2 weeks or as needed    Deejay Pedraza RN    8/12/2019, 3:06 PM    "

## 2019-08-12 NOTE — OUTREACH NOTE
Care Coordination Assessment    Documented/Reviewed By:  Deejay Pedraza RN Date/time:  8/12/2019  3:00 PM   Assessment completed with:  patient  Enrolled in care management program:  Yes  Living arrangement:  spouse  Support system:  spouse  Type of residence:  private residence  Equipment used at home:  none  Inadequate nutrition:  No  Medication adherence problem:  No  Experiencing side effects from current medications:  No  History of fall(s) in last 6 months:  No  Difficulty keeping appointments:  No  Family aware of the patient's advance care planning wishes:  Yes  Chronic pain:  No

## 2019-08-16 ENCOUNTER — OFFICE VISIT (OUTPATIENT)
Dept: CARDIOLOGY | Facility: CLINIC | Age: 84
End: 2019-08-16

## 2019-08-16 VITALS
HEART RATE: 110 BPM | DIASTOLIC BLOOD PRESSURE: 64 MMHG | BODY MASS INDEX: 22.6 KG/M2 | SYSTOLIC BLOOD PRESSURE: 120 MMHG | HEIGHT: 67 IN | WEIGHT: 144 LBS

## 2019-08-16 DIAGNOSIS — I10 ESSENTIAL HYPERTENSION: ICD-10-CM

## 2019-08-16 DIAGNOSIS — G47.33 OSA (OBSTRUCTIVE SLEEP APNEA): ICD-10-CM

## 2019-08-16 DIAGNOSIS — I48.19 PERSISTENT ATRIAL FIBRILLATION (HCC): Primary | ICD-10-CM

## 2019-08-16 DIAGNOSIS — E78.5 HYPERLIPIDEMIA, UNSPECIFIED HYPERLIPIDEMIA TYPE: ICD-10-CM

## 2019-08-16 PROCEDURE — 99204 OFFICE O/P NEW MOD 45 MIN: CPT | Performed by: INTERNAL MEDICINE

## 2019-08-16 PROCEDURE — 93000 ELECTROCARDIOGRAM COMPLETE: CPT | Performed by: INTERNAL MEDICINE

## 2019-08-16 NOTE — PROGRESS NOTES
[facility]      Patient Name: Angela Quinn  :[id number]  Age: 87 y.o.  Encounter Provider:  Blake Lara Jr, MD      Chief Complaint:   Chief Complaint   Patient presents with   • Shortness of Breath         HPI  Angela Quinn is a 87 y.o. female past medical history of hypothyroidism, obstructive sleep apnea, hypertension who presents for initial evaluation of atrial fibrillation.  She was seen for routine follow-up of the previously mentioned medical problems on  and found to be in a regular rhythm.  An EKG showed atrial fibrillation and the patient was sent to the ER.  ER records states that she was there for chest pain and was noted to be in A. fib however the patient vehemently denies this and states that she was asymptomatic when sent to the ER. She has hypothyroidism but was noted to have normal TSH while in the ER.  Heart rate was controlled and troponins were negative so the patient was sent home on new prescription of metoprolol and rivaroxaban.  She denies chest pain shortness of air palpitations.  No dizziness or syncope.  No orthopnea PND or edema.  She is unfortunately only taking her metoprolol tartrate once daily.  She denies any bleeding complications with rivaroxaban.  She is very active and feels no limitation of her activity.  She denies tobacco alcohol or illicit drug use.  She was diagnosed with obstructive sleep apnea and was using her CPAP up until 2 years ago when she felt that she no longer needed it.      The following portions of the patient's history were reviewed and updated as appropriate: allergies, current medications, past family history, past medical history, past social history, past surgical history and problem list.      Review of Systems   Constitution: Negative for chills and fever.   HENT: Negative for hoarse voice and sore throat.    Eyes: Negative for double vision and photophobia.   Cardiovascular: Negative for chest pain, leg  "swelling, near-syncope, orthopnea, palpitations, paroxysmal nocturnal dyspnea and syncope.   Respiratory: Negative for cough and wheezing.    Skin: Negative for poor wound healing and rash.   Musculoskeletal: Negative for arthritis and joint swelling.   Gastrointestinal: Negative for bloating, abdominal pain, hematemesis and hematochezia.   Neurological: Negative for dizziness and focal weakness.   Psychiatric/Behavioral: Negative for depression and suicidal ideas.       OBJECTIVE:   Vital Signs  Vitals:    08/16/19 1023   BP: 120/64   Pulse: 110     Estimated body mass index is 22.55 kg/m² as calculated from the following:    Height as of this encounter: 170.2 cm (67\").    Weight as of this encounter: 65.3 kg (144 lb).    Physical Exam   Constitutional: She is oriented to person, place, and time. She appears well-developed and well-nourished.   HENT:   Head: Normocephalic and atraumatic.   Eyes: Conjunctivae are normal. Pupils are equal, round, and reactive to light.   Neck: No JVD present. No thyromegaly present.   Cardiovascular: Exam reveals no gallop and no friction rub.   No murmur heard.  Irregular rhythm   Pulmonary/Chest: No respiratory distress. She exhibits no tenderness.   Abdominal: Bowel sounds are normal. She exhibits no distension.   Musculoskeletal: She exhibits no edema or tenderness.   Neurological: She is alert and oriented to person, place, and time.   Skin: No rash noted. No erythema.   Psychiatric: She has a normal mood and affect. Judgment normal.   Vitals reviewed.        ECG 12 Lead  Date/Time: 8/16/2019 1:59 PM  Performed by: Blake Lara Jr., MD  Authorized by: Blake Lara Jr., MD   Comparison: compared with previous ECG from 8/7/2019  Similar to previous ECG  Rhythm: atrial fibrillation  Rate: tachycardic  Conduction: left anterior fascicular block  QRS axis: left    Clinical impression: abnormal EKG                ASSESSMENT:      Diagnosis Plan   1. Persistent atrial fibrillation " (CMS/[facility])     2. Essential hypertension     3. Hyperlipidemia, unspecified hyperlipidemia type     4. NISHI (obstructive sleep apnea)           PLAN OF CARE:     1. Persistent atrial fibrillation -rate poorly controlled today but patient is not taking short acting metoprolol correctly.  Will increase dosing to twice daily and check a Holter monitor later in the week.  Chads 2 vasc score of 4 currently on rivaroxaban tolerating well.  Patient is asymptomatic I feel that adequate rate control would be prudent strategy at this time.  If rate control proves difficult to achieve we may consider cardioversion.  Check echocardiogram to exclude valvular cause of atrial fibrillation although physical exam does not support valvular etiology.  I have asked her to consider being reevaluated being for sleep apnea.  I will make a referral to sleep clinic.  2. Hypertension -seemingly well controlled will ask the patient to keep a twice daily blood pressure log.  We have discussed sodium restriction at length.  3. Sleep apnea -she still has CPAP equipment at home will make referral to sleep clinic    Return to clinic 3 months    Atrial Fibrillation and Atrial Flutter  Assessment  • The patient has persistent atrial fibrillation  • The patient's CHADS2-VASc score is 4  • A [id number] score of 2 or more is considered a high risk for a thromboembolic event  • Rivaroxaban prescribed    Plan  • Continue in atrial fibrillation with rate control  • Continue rivaroxaban for antithrombotic therapy, bleeding issues discussed  • Continue beta blocker for rhythm control    Subjective - Objective  • The average duration of atrial fibrillation episodes is >7 days to 3 months             Discharge Medications           Accurate as of 8/16/19  1:55 PM. If you have any questions, ask your nurse or doctor.               Changes to Medications      Instructions Start Date   metoprolol tartrate 25 MG tablet  Commonly known as:  LOPRESSOR  What  changed:  when to take this  Changed by:  Blake Lara Jr, MD   25 mg, Oral, 2 Times Daily         Continue These Medications      Instructions Start Date   alendronate 35 MG tablet  Commonly known as:  FOSAMAX   35 mg, Oral, Every 7 Days      amitriptyline 25 MG tablet  Commonly known as:  ELAVIL   25 mg, Oral, Every Night at Bedtime      anastrozole 1 MG tablet  Commonly known as:  ARIMIDEX   TAKE 1 TABLET EVERY DAY      atorvastatin 10 MG tablet  Commonly known as:  LIPITOR   10 mg, Oral, Daily      Azelastine-Fluticasone 137-50 MCG/ACT suspension   2 sprays, Nasal, 2 Times Daily      CALCIUM 1000 + D 1000-800 MG-UNIT tablet  Generic drug:  Calcium Carb-Cholecalciferol   Oral      chlorhexidine 0.12 % solution  Commonly known as:  PERIDEX   No dose, route, or frequency recorded.      hydrochlorothiazide 25 MG tablet  Commonly known as:  HYDRODIURIL   25 mg, Oral, Daily      levothyroxine 75 MCG tablet  Commonly known as:  SYNTHROID, LEVOTHROID   75 mcg, Oral, Daily      MULTIVITAMIN ADULT PO   Oral      nitrofurantoin (macrocrystal-monohydrate) 100 MG capsule  Commonly known as:  MACROBID   100 mg, Oral, 2 Times Daily      rivaroxaban 20 MG tablet  Commonly known as:  XARELTO   20 mg, Oral, Daily             Thank you for allowing me to participate in the care of your patient,      Sincerely,   Blake Lara Jr, MD  [facility]  08/16/19  1:55 PM    **Dragon Disclaimer:**  Much of this encounter note is an electronic transcription/translation of spoken language to printed text. The electronic translation of spoken language may permit erroneous, or at times, nonsensical words or phrases to be inadvertently transcribed. Although I have reviewed the note for such errors, some may still exist.

## 2019-08-16 NOTE — PATIENT INSTRUCTIONS
Please take the medication called metoprolol once in the morning and once in the evening.  This medication will be refilled for you.  You will receive a phone call from the sleep clinic for evaluation of sleep apnea.  You will receive a phone call to schedule the heart rate monitor and the ultrasound of your heart.

## 2019-08-26 ENCOUNTER — OFFICE VISIT (OUTPATIENT)
Dept: SLEEP MEDICINE | Facility: HOSPITAL | Age: 84
End: 2019-08-26

## 2019-08-26 ENCOUNTER — PATIENT OUTREACH (OUTPATIENT)
Dept: CASE MANAGEMENT | Facility: OTHER | Age: 84
End: 2019-08-26

## 2019-08-26 VITALS
HEART RATE: 76 BPM | HEIGHT: 67 IN | DIASTOLIC BLOOD PRESSURE: 80 MMHG | OXYGEN SATURATION: 99 % | BODY MASS INDEX: 22.54 KG/M2 | SYSTOLIC BLOOD PRESSURE: 124 MMHG | WEIGHT: 143.6 LBS

## 2019-08-26 DIAGNOSIS — I49.9 IRREGULAR HEART BEAT: ICD-10-CM

## 2019-08-26 DIAGNOSIS — G47.33 OSA (OBSTRUCTIVE SLEEP APNEA): ICD-10-CM

## 2019-08-26 DIAGNOSIS — G47.10 HYPERSOMNIA: Primary | ICD-10-CM

## 2019-08-26 PROCEDURE — G0463 HOSPITAL OUTPT CLINIC VISIT: HCPCS

## 2019-08-26 NOTE — OUTREACH NOTE
Patient Outreach Note    Spoke with Mrs Quinn for f/u call. Had cardiology visit 8/16 for cardiac monitoring, received teaching for Afib. Had pulmonary sleep medicine appt today, was educated on the importance of continuous nightly Cpap use as prescribed and used 9 years ago. Pt stopped wearing the Cpap about 2 years ago, instructed on the need for a repeat sleep study. Pt has scheduled overnight study on 10/17.  Pt likes to garden outside. Discussed drinking adequate fluids to avoid dehydration, and to rest if SOB or fatigue. Pt voiced no further needs at present, appreciated the call.     Deejay Pedraza RN    8/26/2019, 4:35 PM

## 2019-08-26 NOTE — PROGRESS NOTES
"[facility]  Telephone: [phone] / Fax: [phone] [place]  Telephone: [phone] / Fax: [phone] Luiza Garcia    Referring Physician: Helen Dean APRN  PCP: Helen Dean APRN    Reason for consult:  sleep apnea    Angela Quinn is a 87 y.o.female  was seen in the Sleep Disorders Center today for re-evaluation of sleep apnea. She was recently diagnosed with irregular heart rate and was recommended to see us to obtain repeat sleep study to see if she needs to resume NISHI treatment. She was originally diagnosed with NISHI in 2010, here at [facility] with AHI 21. She used her machine for awhile but \"slowly weaned herself off and stopped using it\". She denies weight loss. She reports occasional witnessed apneas and hypersomnia. She has a hard time sleeping in strange environment. Her sleep schedule is 10:30pm-7:30am. She falls asleep immediately but wakes up somewhat tired and requires perhaps one nap during the day to feel more refreshed.    SH- no tobacco, no alcohol, 1 coffee per day, no drugs    ROS-+irregular heartbeat, +cough+SOA. Rest is negative.    Angela Quinn  has a past medical history of Breast cancer (CMS/HCC), Cancer (CMS/HCC), Cataract, Colon polyp, hyperplastic (6/13/2016), Depression, Disease of thyroid gland, Glaucoma, [place] (hard of hearing), Hyperlipidemia, Hypertension, Osteopenia, Radiation, and Sleep apnea.    Current Medications:    Current Outpatient Medications:   •  alendronate (FOSAMAX) 35 MG tablet, Take 1 tablet by mouth Every 7 (Seven) Days., Disp: 12 tablet, Rfl: 3  •  amitriptyline (ELAVIL) 25 MG tablet, Take 1 tablet by mouth every night at bedtime., Disp: 90 tablet, Rfl: 3  •  anastrozole (ARIMIDEX) 1 MG tablet, TAKE 1 TABLET EVERY DAY, Disp: 90 tablet, Rfl: 1  •  atorvastatin (LIPITOR) 10 MG tablet, Take 1 tablet by mouth Daily., Disp: 90 tablet, Rfl: 1  •  Azelastine-Fluticasone 137-50 MCG/ACT suspension, 2 sprays into each nostril 2 (Two) " "Times a Day., Disp: 1 bottle, Rfl: 1  •  Calcium Carb-Cholecalciferol (CALCIUM 1000 + D) 1000-800 MG-UNIT tablet, Take  by mouth., Disp: , Rfl:   •  chlorhexidine (PERIDEX) 0.12 % solution, , Disp: , Rfl:   •  hydrochlorothiazide (HYDRODIURIL) 25 MG tablet, Take 1 tablet by mouth Daily., Disp: 90 tablet, Rfl: 1  •  levothyroxine (SYNTHROID, LEVOTHROID) 75 MCG tablet, Take 1 tablet by mouth Daily., Disp: 90 tablet, Rfl: 1  •  metoprolol tartrate (LOPRESSOR) 25 MG tablet, Take 1 tablet by mouth 2 (Two) Times a Day., Disp: 30 tablet, Rfl: 0  •  Multiple Vitamins-Minerals (MULTIVITAMIN ADULT PO), Take  by mouth., Disp: , Rfl:   •  nitrofurantoin, macrocrystal-monohydrate, (MACROBID) 100 MG capsule, Take 1 capsule by mouth 2 (Two) Times a Day., Disp: 14 capsule, Rfl: 0  •  rivaroxaban (XARELTO) 20 MG tablet, Take 1 tablet by mouth Daily., Disp: 30 tablet, Rfl: 0    I have reviewed Past Medical History, Past Surgical History, Medication List, Social History and Family History as entered in Sleep Questionnaire and EPIC.    ESS  2   Vital Signs /80 (BP Location: Right arm, Patient Position: Sitting, Cuff Size: Adult)   Pulse 76   Ht 170.2 cm (67\")   Wt 65.1 kg (143 lb 9.6 oz)   SpO2 99%   BMI 22.49 kg/m²  Body mass index is 22.49 kg/m².    General Alert and oriented. No acute distress noted   Pharynx/Throat Class IV Mallampati airway, large tongue, no evidence of redundant lateral pharyngeal tissue. No oral lesions. No thrush. Moist mucous membranes.   Head Normocephalic. Symmetrical. Atraumatic.    Nose No septal deviation. No drainage   Chest Wall Normal shape. Symmetric expansion with respiration. No tenderness.   Neck Trachea midline, no thyromegaly or adenopathy    Lungs Clear to auscultation bilaterally. No wheezes. No rhonchi. No rales. Respirations regular, even and unlabored.   Heart Regular rhythm and normal rate. Normal S1 and S2. No murmur   Abdomen Soft, non-tender and non-distended. Normal bowel " sounds. No masses.   Extremities Moves all extremities well. No edema   Psychiatric Normal mood and affect.        Testing   PSG 2010 [facility] AHI 21      Impression:  1. Hypersomnia    2. History of NISHI (obstructive sleep apnea)    3. Irregular heart beat          Plan:  I discussed the pathophysiology of obstructive sleep apnea with the patient.  We discussed the adverse outcomes associated with untreated sleep-disordered breathing.  We discussed treatment modalities of obstructive sleep apnea including CPAP device as well as oral mandibular advancement device. Sleep study will be scheduled to establish definitive diagnosis of sleep disorder breathing.  Weight loss will be strongly beneficial in order to reduce the severity of sleep-disordered breathing.  Patient has narrow oropharyngeal structure.  Caution during activities that require prolonged concentration is strongly advised.  Patient will be notified of sleep study results after sleep study is completed.  If sleep apnea is only mild,  oral mandibular advancement device may be one of the treatment options.  However if sleep apnea is moderately severe, CPAP treatment will be strongly encouraged.  The patient is not opposed to treatment with CPAP device if we confirm significant obstructive sleep apnea on polysomnography. Prescription for Ambien was provided to bring to the Sleep lab to improve sleep efficiency.  Patient was asked to not take the Ambien at home.    Thank you for allowing me to participate in your patient's care.    The patient will follow up with Dr. Jackman after completion of polysomnography.    NATASHA Kwon  [facility]  Phone: [phone]      Part of this note may be an electronic transcription/translation of spoken language to printed text using the Dragon Dictation System. Some errors may exist even though the document was edited.

## 2019-09-05 ENCOUNTER — HOSPITAL ENCOUNTER (OUTPATIENT)
Dept: CARDIOLOGY | Facility: HOSPITAL | Age: 84
Discharge: HOME | End: 2019-09-05
Admitting: INTERNAL MEDICINE

## 2019-09-05 VITALS
WEIGHT: 143 LBS | HEIGHT: 67 IN | BODY MASS INDEX: 22.44 KG/M2 | DIASTOLIC BLOOD PRESSURE: 64 MMHG | SYSTOLIC BLOOD PRESSURE: 120 MMHG

## 2019-09-05 DIAGNOSIS — I48.19 PERSISTENT ATRIAL FIBRILLATION (HCC): ICD-10-CM

## 2019-09-05 PROCEDURE — 0399T ADULT TRANSTHORACIC ECHO COMPLETE W/ CONT IF NECESSARY PER PROTOCOL: CPT | Performed by: INTERNAL MEDICINE

## 2019-09-05 PROCEDURE — 0399T HC MYOCARDL STRAIN IMAG QUAN ASSMT PER SESS: CPT

## 2019-09-05 PROCEDURE — 93306 TTE W/DOPPLER COMPLETE: CPT

## 2019-09-05 PROCEDURE — 93306 TTE W/DOPPLER COMPLETE: CPT | Performed by: INTERNAL MEDICINE

## 2019-09-06 ENCOUNTER — EPISODE CHANGES (OUTPATIENT)
Dept: CASE MANAGEMENT | Facility: OTHER | Age: 84
End: 2019-09-06

## 2019-09-06 LAB
AORTIC ROOT ANNULUS: 1.8 CM
ASCENDING AORTA: 3.5 CM
BH CV ECHO MEAS - ACS: 2 CM
BH CV ECHO MEAS - AO MAX PG (FULL): 2.8 MMHG
BH CV ECHO MEAS - AO MAX PG: 4.8 MMHG
BH CV ECHO MEAS - AO MEAN PG (FULL): 1.5 MMHG
BH CV ECHO MEAS - AO MEAN PG: 2.6 MMHG
BH CV ECHO MEAS - AO V2 MAX: 109.6 CM/SEC
BH CV ECHO MEAS - AO V2 MEAN: 73.5 CM/SEC
BH CV ECHO MEAS - AO V2 VTI: 21.2 CM
BH CV ECHO MEAS - ASC AORTA: 3.5 CM
BH CV ECHO MEAS - AVA(I,A): 1.5 CM^2
BH CV ECHO MEAS - AVA(I,D): 1.5 CM^2
BH CV ECHO MEAS - AVA(V,A): 1.8 CM^2
BH CV ECHO MEAS - AVA(V,D): 1.8 CM^2
BH CV ECHO MEAS - BSA(HAYCOCK): 1.8 M^2
BH CV ECHO MEAS - BSA: 1.8 M^2
BH CV ECHO MEAS - BZI_BMI: 22.4 KILOGRAMS/M^2
BH CV ECHO MEAS - BZI_METRIC_HEIGHT: 170.2 CM
BH CV ECHO MEAS - BZI_METRIC_WEIGHT: 64.9 KG
BH CV ECHO MEAS - EDV(MOD-SP2): 63 ML
BH CV ECHO MEAS - EDV(MOD-SP4): 70 ML
BH CV ECHO MEAS - EDV(TEICH): 107.6 ML
BH CV ECHO MEAS - EF(CUBED): 62.7 %
BH CV ECHO MEAS - EF(MOD-BP): 53 %
BH CV ECHO MEAS - EF(MOD-SP2): 54 %
BH CV ECHO MEAS - EF(MOD-SP4): 51.4 %
BH CV ECHO MEAS - EF(TEICH): 54.1 %
BH CV ECHO MEAS - ESV(MOD-SP2): 29 ML
BH CV ECHO MEAS - ESV(MOD-SP4): 34 ML
BH CV ECHO MEAS - ESV(TEICH): 49.4 ML
BH CV ECHO MEAS - IVS/LVPW: 1.1
BH CV ECHO MEAS - IVSD: 1 CM
BH CV ECHO MEAS - LAT PEAK E' VEL: 14 CM/SEC
BH CV ECHO MEAS - LV DIASTOLIC VOL/BSA (35-75): 39.9 ML/M^2
BH CV ECHO MEAS - LV MASS(C)D: 165.6 GRAMS
BH CV ECHO MEAS - LV MASS(C)DI: 94.4 GRAMS/M^2
BH CV ECHO MEAS - LV MAX PG: 2 MMHG
BH CV ECHO MEAS - LV MEAN PG: 1.1 MMHG
BH CV ECHO MEAS - LV SYSTOLIC VOL/BSA (12-30): 19.4 ML/M^2
BH CV ECHO MEAS - LV V1 MAX: 71.4 CM/SEC
BH CV ECHO MEAS - LV V1 MEAN: 49.4 CM/SEC
BH CV ECHO MEAS - LV V1 VTI: 11.1 CM
BH CV ECHO MEAS - LVIDD: 4.8 CM
BH CV ECHO MEAS - LVIDS: 3.5 CM
BH CV ECHO MEAS - LVLD AP2: 7.6 CM
BH CV ECHO MEAS - LVLD AP4: 7 CM
BH CV ECHO MEAS - LVLS AP2: 6.6 CM
BH CV ECHO MEAS - LVLS AP4: 6.2 CM
BH CV ECHO MEAS - LVOT AREA (M): 2.8 CM^2
BH CV ECHO MEAS - LVOT AREA: 2.8 CM^2
BH CV ECHO MEAS - LVOT DIAM: 1.9 CM
BH CV ECHO MEAS - LVPWD: 0.92 CM
BH CV ECHO MEAS - MED PEAK E' VEL: 10 CM/SEC
BH CV ECHO MEAS - MR MAX PG: 134.5 MMHG
BH CV ECHO MEAS - MR MAX VEL: 579.9 CM/SEC
BH CV ECHO MEAS - MR MEAN PG: 86.8 MMHG
BH CV ECHO MEAS - MR MEAN VEL: 446.3 CM/SEC
BH CV ECHO MEAS - MR VTI: 177.6 CM
BH CV ECHO MEAS - MV DEC SLOPE: 422.8 CM/SEC^2
BH CV ECHO MEAS - MV DEC TIME: 0.2 SEC
BH CV ECHO MEAS - MV E MAX VEL: 75.6 CM/SEC
BH CV ECHO MEAS - MV MAX PG: 5.8 MMHG
BH CV ECHO MEAS - MV MEAN PG: 3.6 MMHG
BH CV ECHO MEAS - MV P1/2T MAX VEL: 74.8 CM/SEC
BH CV ECHO MEAS - MV P1/2T: 51.8 MSEC
BH CV ECHO MEAS - MV V2 MAX: 120.8 CM/SEC
BH CV ECHO MEAS - MV V2 MEAN: 92.1 CM/SEC
BH CV ECHO MEAS - MV V2 VTI: 19.5 CM
BH CV ECHO MEAS - MVA P1/2T LCG: 2.9 CM^2
BH CV ECHO MEAS - MVA(P1/2T): 4.2 CM^2
BH CV ECHO MEAS - MVA(VTI): 1.6 CM^2
BH CV ECHO MEAS - PA MAX PG (FULL): 1.2 MMHG
BH CV ECHO MEAS - PA MAX PG: 2.1 MMHG
BH CV ECHO MEAS - PA V2 MAX: 72.7 CM/SEC
BH CV ECHO MEAS - PVA(V,A): 1.4 CM^2
BH CV ECHO MEAS - PVA(V,D): 1.4 CM^2
BH CV ECHO MEAS - QP/QS: 0.68
BH CV ECHO MEAS - RAP SYSTOLE: 15 MMHG
BH CV ECHO MEAS - RV MAX PG: 0.87 MMHG
BH CV ECHO MEAS - RV MEAN PG: 0.42 MMHG
BH CV ECHO MEAS - RV V1 MAX: 46.6 CM/SEC
BH CV ECHO MEAS - RV V1 MEAN: 28 CM/SEC
BH CV ECHO MEAS - RV V1 VTI: 9.9 CM
BH CV ECHO MEAS - RVOT AREA: 2.2 CM^2
BH CV ECHO MEAS - RVOT DIAM: 1.7 CM
BH CV ECHO MEAS - RVSP: 44.2 MMHG
BH CV ECHO MEAS - SI(CUBED): 39.6 ML/M^2
BH CV ECHO MEAS - SI(LVOT): 17.9 ML/M^2
BH CV ECHO MEAS - SI(MOD-SP2): 19.4 ML/M^2
BH CV ECHO MEAS - SI(MOD-SP4): 20.5 ML/M^2
BH CV ECHO MEAS - SI(TEICH): 33.2 ML/M^2
BH CV ECHO MEAS - SV(CUBED): 69.4 ML
BH CV ECHO MEAS - SV(LVOT): 31.4 ML
BH CV ECHO MEAS - SV(MOD-SP2): 34 ML
BH CV ECHO MEAS - SV(MOD-SP4): 36 ML
BH CV ECHO MEAS - SV(RVOT): 21.3 ML
BH CV ECHO MEAS - SV(TEICH): 58.2 ML
BH CV ECHO MEAS - TAPSE (>1.6): 2 CM2
BH CV ECHO MEAS - TR MAX VEL: 270.1 CM/SEC
BH CV ECHO MEASUREMENTS AVERAGE E/E' RATIO: 6.3
BH CV XLRA - RV BASE: 2.7 CM
BH CV XLRA - TDI S': 11 CM/SEC
LEFT ATRIUM VOLUME INDEX: 34 ML/M2
MAXIMAL PREDICTED HEART RATE: 133 BPM
SINUS: 3.5 CM
STJ: 3.2 CM
STRESS TARGET HR: 113 BPM

## 2019-09-09 ENCOUNTER — TELEPHONE (OUTPATIENT)
Dept: CARDIOLOGY | Facility: CLINIC | Age: 84
End: 2019-09-09

## 2019-09-09 NOTE — TELEPHONE ENCOUNTER
Spoke to patient aboput echo results. Mod-severe MR. Twice daily BP log. Please make sure she has fu within 6 months.

## 2019-09-16 DIAGNOSIS — G62.9 PERIPHERAL POLYNEUROPATHY: ICD-10-CM

## 2019-09-16 RX ORDER — AMITRIPTYLINE HYDROCHLORIDE 25 MG/1
25 TABLET, FILM COATED ORAL
Qty: 90 TABLET | Refills: 0 | Status: SHIPPED | OUTPATIENT
Start: 2019-09-16 | End: 2019-12-23

## 2019-09-17 ENCOUNTER — APPOINTMENT (OUTPATIENT)
Dept: BONE DENSITY | Facility: HOSPITAL | Age: 84
End: 2019-09-17

## 2019-09-24 ENCOUNTER — LAB (OUTPATIENT)
Dept: LAB | Facility: HOSPITAL | Age: 84
End: 2019-09-24

## 2019-09-24 ENCOUNTER — OFFICE VISIT (OUTPATIENT)
Dept: ONCOLOGY | Facility: CLINIC | Age: 84
End: 2019-09-24

## 2019-09-24 VITALS
SYSTOLIC BLOOD PRESSURE: 151 MMHG | OXYGEN SATURATION: 95 % | HEIGHT: 67 IN | DIASTOLIC BLOOD PRESSURE: 86 MMHG | HEART RATE: 88 BPM | BODY MASS INDEX: 22.6 KG/M2 | TEMPERATURE: 98.4 F | RESPIRATION RATE: 18 BRPM | WEIGHT: 144 LBS

## 2019-09-24 DIAGNOSIS — C50.919 MALIGNANT NEOPLASM OF FEMALE BREAST, UNSPECIFIED ESTROGEN RECEPTOR STATUS, UNSPECIFIED LATERALITY, UNSPECIFIED SITE OF BREAST (HCC): Primary | ICD-10-CM

## 2019-09-24 DIAGNOSIS — M81.0 OSTEOPOROSIS, UNSPECIFIED OSTEOPOROSIS TYPE, UNSPECIFIED PATHOLOGICAL FRACTURE PRESENCE: ICD-10-CM

## 2019-09-24 LAB
BASOPHILS # BLD AUTO: 0.09 10*3/MM3 (ref 0–0.2)
BASOPHILS NFR BLD AUTO: 1.5 % (ref 0–1.5)
DEPRECATED RDW RBC AUTO: 48 FL (ref 37–54)
EOSINOPHIL # BLD AUTO: 0.17 10*3/MM3 (ref 0–0.4)
EOSINOPHIL NFR BLD AUTO: 2.8 % (ref 0.3–6.2)
ERYTHROCYTE [DISTWIDTH] IN BLOOD BY AUTOMATED COUNT: 14 % (ref 12.3–15.4)
HCT VFR BLD AUTO: 42.2 % (ref 34–46.6)
HGB BLD-MCNC: 14 G/DL (ref 12–15.9)
IMM GRANULOCYTES # BLD AUTO: 0.02 10*3/MM3 (ref 0–0.05)
IMM GRANULOCYTES NFR BLD AUTO: 0.3 % (ref 0–0.5)
LYMPHOCYTES # BLD AUTO: 1.85 10*3/MM3 (ref 0.7–3.1)
LYMPHOCYTES NFR BLD AUTO: 30 % (ref 19.6–45.3)
MCH RBC QN AUTO: 31.1 PG (ref 26.6–33)
MCHC RBC AUTO-ENTMCNC: 33.2 G/DL (ref 31.5–35.7)
MCV RBC AUTO: 93.8 FL (ref 79–97)
MONOCYTES # BLD AUTO: 0.71 10*3/MM3 (ref 0.1–0.9)
MONOCYTES NFR BLD AUTO: 11.5 % (ref 5–12)
NEUTROPHILS # BLD AUTO: 3.33 10*3/MM3 (ref 1.7–7)
NEUTROPHILS NFR BLD AUTO: 53.9 % (ref 42.7–76)
NRBC BLD AUTO-RTO: 0 /100 WBC (ref 0–0.2)
PLATELET # BLD AUTO: 193 10*3/MM3 (ref 140–450)
PMV BLD AUTO: 10.8 FL (ref 6–12)
RBC # BLD AUTO: 4.5 10*6/MM3 (ref 3.77–5.28)
WBC NRBC COR # BLD: 6.17 10*3/MM3 (ref 3.4–10.8)

## 2019-09-24 PROCEDURE — G0463 HOSPITAL OUTPT CLINIC VISIT: HCPCS | Performed by: INTERNAL MEDICINE

## 2019-09-24 PROCEDURE — 85025 COMPLETE CBC W/AUTO DIFF WBC: CPT | Performed by: INTERNAL MEDICINE

## 2019-09-24 PROCEDURE — 99214 OFFICE O/P EST MOD 30 MIN: CPT | Performed by: INTERNAL MEDICINE

## 2019-09-24 PROCEDURE — 36415 COLL VENOUS BLD VENIPUNCTURE: CPT | Performed by: INTERNAL MEDICINE

## 2019-09-24 RX ORDER — ZOLPIDEM TARTRATE 5 MG/1
TABLET ORAL
COMMUNITY
Start: 2019-08-28 | End: 2021-06-04

## 2019-09-24 RX ORDER — RALOXIFENE HYDROCHLORIDE 60 MG/1
60 TABLET, FILM COATED ORAL DAILY
Qty: 30 TABLET | Refills: 6 | Status: SHIPPED | OUTPATIENT
Start: 2019-09-24 | End: 2020-12-29

## 2019-09-24 NOTE — PROGRESS NOTES
Subjective .     REASONS FOR FOLLOWUP:    [id number] invasive ductal carcinoma in the left breast. The mammogram showed the lesion to be 1.3 x 1.7 cm. She had a digital mammogram that showed a 1.8 cm mass in the left breast at 6 o'clock position. The biopsy was consistent wi t h invasive ductal carcinoma, grade 2, [place] score of 6 out of 9, ER positive greater than 90%, MI positive greater than 90%, HER-2/shannon 2+ by immunohistochemistry but negative by FISH. She is status post lumpectomy which showed a 1.3 x 1.7 cm mass, gr a de 2, Kallie score of 6 out of 9 and there was a component of DCIS. A second focus of DCIS was present along the superior margin away from the actual mass and the superior margin was involved. As a result she underwent re-excision and this was negati ve. She had 3 lymph nodes removed and there was macrometastasis in 1 lymph node which was about 0.4 cm. Currently the patient refuses chemotherapy and is going to be started on hormonal treatment with Arimidex.      HISTORY OF PRESENT ILLNESS:  The patient is a 87 y.o. year old female who is here for follow-up with the above-mentioned history.    History of Present Illness     The patient is an elderly female, currently here for followup. She is on Arimidex for her [id number] invasive ductal carcinoma of the left breast.  Given her age patient had refused chemotherapy and currently is just on Arimidex.  She is tolerating it very well.  She started Arimidex since June 2014.  She is 4 .5 years out.  She does not have much of arthralgias.  She is on Fosamax for osteopenia.  Her last bone density was in July 2017.    DEXA scan has not been done yet.  We will need to order        PAST MEDICAL HISTORY:   1. Significant for high cholesterol.   2. Hyperthyroidism.   3. Depression.   4. History of hypertension.  5.    PAST SURGICAL HISTORY: Three C-sections.     OB/GYN HISTORY: She started menstrual periods at age 14. She attained menopause around 50.  She is  3, para 3, no miscarriages. She was 23 years old when she had her first child and 29 when she had the last one. Her children are 59, 58 and 54 years old. Two girls and 1 boy.     ONCOLOGIC HISTORY      ONCOLOGIC HISTORY: The patient is an 82-year-old female who likes to be called Tati who has noticed a bump in the left breast. As a result, she went to her primary care physician and mammogram was ordered. On 2014 she underwent mammogram at [facility] and the mammogram showed evidence of a 1.3 x 0.7 x 0.9 cm irregular hypoechoic mass that extends into the skin and in the left breast at the 7 o'clock position 11 cm from the nipple. As a result a biopsy was recommended. The mammogra m on the right was negative. She underwent a bilateral digital mammogram which showed that she had scattered fibroglandular densities throughout both breasts within the posterior one-third of the left breast at the 6 o'clock position . At the inframammary fold there was an irregular mass that measured 1.8 cm in greatest dimension with spiculation and architectural distortion noted. No abnormality was seen on the right breast. Mildly prominent left axillary lymph node was noted. Ultrasound was done which showed that there was a 1.3 x 0.7 x 0.9 cm irregular mass that extended into the skin. As a result, the patient underwent ultrasound-guided biopsy on 2014. The pathology, accession #[id number]. 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 The histologic grade was 2, [place] score was 6 out of 9 and the maximal span of invasive carcinoma in the core was 8 mm. The estrogen receptors were done and was greater than 90%, progestero n e receptor was 90%, HER-2 by immunohistochemistry was 2+ but by FISH HER/2 ratio was 1.4 which is negative. Subsequently, the patient was referred to Dr. Bagley and MRI of the breast was performed  on 05/02/2014 and MRI showed that within the left breas t at 6 o'clock position in the posterior one-third near the inframammary fold there was an irregular enhancing mass with internal metallic clip. There was linear enhancement extending anteriorly away from the mass. The mass itself measured 2 cm in the sup e rior to inferior dimension, 2 cm in anterior to posterior dimension and 1.9 cm in the medial to lateral dimension and extending anteriorly away from the mass and contiguous with the mass is a linear clump enhancement that measured about 2.5 cm in the ant e rior posterior dimension. This brings the greatest dimension of the mass and adjacent near clump enhancement to be a total of 4.4 cm. There were no other areas that were suspicious. The right breast was negative. As a result the patient underwent surger y . She underwent left breast lumpectomy with sentinel lymph node sampling on 05/22/2014. The pathology accession number is [id number]. Pathology is consistent with invasive ductal carcinoma intermediate grade, Kallie score 6, tumor size 1.5 x 1.3 x 1.2 c m. 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 Intermediate nuclear grade minor component margins were focally involved by DCIS of the superior margin. There is a focus of intermediate grade DCIS located approximately 1.3 cm from the invasive carcinoma, which involves the superior margin and distance of invasive carcinoma from the closest margin was 1 mm superiorly and distance of in situ carcinoma from the closest margins was involved superiorly. There were 3 lymph nodes removed. There was micrometast a sis in 1 lymph node and the size of the micrometastasis was 0.4 cm and it is a [id number] invasive ductal carcinoma. There was tumor invasion of dermis present. The patient then had to undergo a second surgery on 06/11/2014 because of positive margin. She u nderwent re-excision of the superior margin of the left breast. The pathology accession number is  [id number] and the patient underwent a left breast lumpectomy re-excision. Focal non-atypical ductal hyperplasia, extensive fat necrosis with fibrosis and mix e d inflammation, no atypia. The patient then was referred here for the evaluation of options of treatment. She has already seen Dr. Pat Sage yesterday with plans of starting radiation on Monday. She has healed up from the surgery. She is here to disc uss options of further treatment from us. Currently she has no complaints except for a cough, which is chronic, which is likely secondary to allergies as the cough gets worse during the allergy season.    Patient has been on Arimidex since 2014 with olive ns to continue a total of 5 years.    Radiation between 2014 to 2014.    The 2015 mammogram is benign. We will continue Arimidex.    2016 mammogram negative.  Screening mammogram 2018-    MEDICATIONS: The current medication list was reviewed with the patient and updates in the EMR this date per the medical assistant. Medication dosages and frequencies were confirmed to be accurate.     ALLERGIES: Not allergic to any medications.     SOCIAL HISTORY: She is  and lives with her . She is very much into gardening and reading. She does not smoke. She does not drink alcohol. She has no history of any previous blood transfusions  .   FAMILY HISTORY: Father  of a heart attack at 69. Mother had colon cancer and  of colon cancer at 76. She has 1 brother who is 77 and in good health. There is no other family history of breast cancer.             Current Outpatient Medications on File Prior to Visit   Medication Sig Dispense Refill   • alendronate (FOSAMAX) 35 MG tablet Take 1 tablet by mouth Every 7 (Seven) Days. 12 tablet 3   • amitriptyline (ELAVIL) 25 MG tablet Take 1 tablet by mouth every night at bedtime. 90 tablet 0   • anastrozole (ARIMIDEX) 1 MG tablet TAKE 1 TABLET EVERY DAY 90 tablet 1   • atorvastatin  (LIPITOR) 10 MG tablet Take 1 tablet by mouth Daily. 90 tablet 1   • Azelastine-Fluticasone 137-50 MCG/ACT suspension 2 sprays into each nostril 2 (Two) Times a Day. 1 bottle 1   • Calcium Carb-Cholecalciferol (CALCIUM 1000 + D) 1000-800 MG-UNIT tablet Take  by mouth.     • levothyroxine (SYNTHROID, LEVOTHROID) 75 MCG tablet Take 1 tablet by mouth Daily. 90 tablet 1   • metoprolol tartrate (LOPRESSOR) 25 MG tablet TAKE 1 TABLET BY MOUTH TWO TIMES A DAY  30 tablet 1   • Multiple Vitamins-Minerals (MULTIVITAMIN ADULT PO) Take  by mouth.     • rivaroxaban (XARELTO) 20 MG tablet Take 1 tablet by mouth Daily. 30 tablet 1   • [DISCONTINUED] hydrochlorothiazide (HYDRODIURIL) 25 MG tablet Take 1 tablet by mouth Daily. 90 tablet 1   • chlorhexidine (PERIDEX) 0.12 % solution      • zolpidem (AMBIEN) 5 MG tablet        No current facility-administered medications on file prior to visit.          Review of Systems   Constitutional: Negative for appetite change, chills, diaphoresis, fatigue, fever and unexpected weight change.   HENT: Negative for hearing loss, sore throat and trouble swallowing.    Respiratory: Negative for cough, chest tightness, shortness of breath and wheezing.    Cardiovascular: Negative for chest pain, palpitations and leg swelling.   Gastrointestinal: Negative for abdominal distention, abdominal pain, constipation, diarrhea, nausea and vomiting.   Genitourinary: Negative for dysuria, frequency, hematuria and urgency.   Musculoskeletal: Negative for joint swelling.        No muscle weakness.   Skin: Negative for rash and wound.   Neurological: Negative for seizures, syncope, speech difficulty, weakness, numbness and headaches.   Hematological: Negative for adenopathy. Bruises/bleeds easily (9/24/19).   Psychiatric/Behavioral: Positive for sleep disturbance (having sleep study in october 9/24/19). Negative for behavioral problems, confusion and suicidal ideas. The patient is nervous/anxious  "(09/24/19-Unchanged).      Objective      Vitals:    09/24/19 1114   BP: 151/86   Pulse: 88   Resp: 18   Temp: 98.4 °F (36.9 °C)   SpO2: 95%   Weight: 65.3 kg (144 lb)   Height: 170.2 cm (67.01\")   PainSc: 0-No pain     Current Status 9/24/2019   ECOG score 0       Physical Exam       GENERAL:  Well-developed, well-nourished in no acute distress.   NECK:  Supple with good range of motion; no thyromegaly or masses, no JVD.  LYMPHATICS:  No cervical, supraclavicular, axillary or inguinal adenopathy.  CHEST:  Lungs clear to auscultation. Good airflow.  BREAST: Right breast: No skin changes, no evidence of breast mass, no nipple discharge, no evidence of any right axillary adenopathy or right supraclavicular adenopathy  Left breast: No evidence of any skin changes, no evidence of any left breast mass and no evidence of left nipple discharge as well as no left axillary adenopathy or left supraclavicular adenopathy.  CARDIAC:  Regular rate and rhythm without murmurs, rubs or gallops. Normal S1,S2.  ABDOMEN:  Soft, nontender with no hepatosplenomegaly or masses.  EXTREMITIES:  No clubbing, cyanosis or edema.  NEUROLOGICAL:  Cranial Nerves II-XII grossly intact.  No focal neurological deficits.  PSYCHIATRIC:  Normal affect and mood.        RECENT LABS:  Hematology WBC   Date Value Ref Range Status   09/24/2019 6.17 3.40 - 10.80 10*3/mm3 Final     RBC   Date Value Ref Range Status   09/24/2019 4.50 3.77 - 5.28 10*6/mm3 Final     Hemoglobin   Date Value Ref Range Status   09/24/2019 14.0 12.0 - 15.9 g/dL Final     Hematocrit   Date Value Ref Range Status   09/24/2019 42.2 34.0 - 46.6 % Final     Platelets   Date Value Ref Range Status   09/24/2019 193 140 - 450 10*3/mm3 Final        Assessment/Plan      1. This is a patient with history of T1N1MX invasive ductal carcinoma of the left breast status post surgery, status post radiation, currently she is tolerating Arimidex.      2. Mild arthralgias related to Arimidex.  Patient " is in a 5thyear.    3. Osteopenia for which she is taking calcium, vitamin D and fosomax.      4. Our plan is to continue Arimidex 1 mg daily. She is 5  years into treatment.  Continue Arimidex    5. We will plan to bring her back in 6 months with CBC, CMP, LDH and we will get a bone density prior to that.      6.  Obtain DEXA scan 1 week prior to MD appointment    MD Yudi Gan William P., MD

## 2019-09-25 DIAGNOSIS — R03.0 ELEVATED BLOOD PRESSURE READING WITHOUT DIAGNOSIS OF HYPERTENSION: ICD-10-CM

## 2019-09-25 RX ORDER — HYDROCHLOROTHIAZIDE 25 MG/1
TABLET ORAL
Qty: 90 TABLET | Refills: 0 | Status: SHIPPED | OUTPATIENT
Start: 2019-09-25 | End: 2019-12-31

## 2019-10-10 DIAGNOSIS — E03.9 ADULT HYPOTHYROIDISM: ICD-10-CM

## 2019-10-11 RX ORDER — LEVOTHYROXINE SODIUM 0.07 MG/1
TABLET ORAL
Qty: 90 TABLET | Refills: 1 | OUTPATIENT
Start: 2019-10-11

## 2019-10-16 DIAGNOSIS — E03.9 ADULT HYPOTHYROIDISM: ICD-10-CM

## 2019-10-16 RX ORDER — LEVOTHYROXINE SODIUM 0.07 MG/1
75 TABLET ORAL DAILY
Qty: 90 TABLET | Refills: 0 | Status: SHIPPED | OUTPATIENT
Start: 2019-10-16 | End: 2020-01-22

## 2019-10-16 NOTE — TELEPHONE ENCOUNTER
S/W pt and explained she needed to schedule an upcoming appt around Feb 2020 which will be her 6 mth F/U on labs and medications. PT VERBALIZED AN UNDERSTANDING.    Scheduled appt for 02.11.2020 @ 8:45 w/ HH

## 2019-10-16 NOTE — TELEPHONE ENCOUNTER
Patient needs a refill on her levothyroxine. She was told she needs to set up an appt but patient stated she was seen in august. She says she is out of her medication but then she stated she had 100mg pills that she was cutting the tip of it off to be 75mg she would like us to call her in a prescrption without her having to come in the office. Please call her at [phone]

## 2019-11-14 RX ORDER — RIVAROXABAN 20 MG/1
TABLET, FILM COATED ORAL
Qty: 30 TABLET | Refills: 4 | Status: SHIPPED | OUTPATIENT
Start: 2019-11-14 | End: 2020-02-19 | Stop reason: SDUPTHER

## 2019-11-15 ENCOUNTER — FLU SHOT (OUTPATIENT)
Dept: INTERNAL MEDICINE | Age: 84
End: 2019-11-15

## 2019-11-15 DIAGNOSIS — Z23 NEED FOR INFLUENZA VACCINATION: ICD-10-CM

## 2019-11-15 PROCEDURE — 90653 IIV ADJUVANT VACCINE IM: CPT | Performed by: INTERNAL MEDICINE

## 2019-11-15 PROCEDURE — G0008 ADMIN INFLUENZA VIRUS VAC: HCPCS | Performed by: INTERNAL MEDICINE

## 2019-11-18 ENCOUNTER — OFFICE VISIT (OUTPATIENT)
Dept: CARDIOLOGY | Facility: CLINIC | Age: 84
End: 2019-11-18

## 2019-11-18 VITALS
RESPIRATION RATE: 18 BRPM | HEART RATE: 84 BPM | OXYGEN SATURATION: 98 % | SYSTOLIC BLOOD PRESSURE: 146 MMHG | WEIGHT: 146 LBS | BODY MASS INDEX: 22.91 KG/M2 | HEIGHT: 67 IN | DIASTOLIC BLOOD PRESSURE: 68 MMHG

## 2019-11-18 DIAGNOSIS — I48.19 PERSISTENT ATRIAL FIBRILLATION (HCC): Primary | ICD-10-CM

## 2019-11-18 DIAGNOSIS — E78.5 HYPERLIPIDEMIA, UNSPECIFIED HYPERLIPIDEMIA TYPE: ICD-10-CM

## 2019-11-18 DIAGNOSIS — I10 ESSENTIAL HYPERTENSION: ICD-10-CM

## 2019-11-18 PROCEDURE — 99214 OFFICE O/P EST MOD 30 MIN: CPT | Performed by: INTERNAL MEDICINE

## 2019-11-18 NOTE — PROGRESS NOTES
[facility]      Patient Name: Angela Quinn  :[id number]  Age: 87 y.o.  Encounter Provider:  Blake Lara Jr, MD      Chief Complaint:   Chief Complaint   Patient presents with   • Atrial Fibrillation         Atrial Fibrillation   Symptoms include shortness of breath. Symptoms are negative for chest pain, dizziness, palpitations and syncope. Past medical history includes atrial fibrillation.     Angela Quinn is a 87 y.o. female past medical history of hypothyroidism, obstructive sleep apnea, hypertension who presents for follow-up of atrial fibrillation.  She was seen by PCP for routine follow-up of the previously mentioned medical problems on  and found to be in an irregular rhythm.  An EKG showed atrial fibrillation and the patient was sent to the ER.  ER records states that she was there for chest pain and was noted to be in A. fib however the patient vehemently denies this and states that she was asymptomatic when sent to the ER. She has hypothyroidism but was noted to have normal TSH while in the ER.  Heart rate was controlled and troponins were negative so the patient was sent home on new prescription of metoprolol and rivaroxaban.  She denies chest pain shortness of air palpitations.  No dizziness or syncope.  No orthopnea PND or edema.  She was only taking her metoprolol tartrate once daily.  She denies any bleeding complications with rivaroxaban.  She is very active and feels no limitation of her activity.  She denies tobacco alcohol or illicit drug use.  She was diagnosed with obstructive sleep apnea and was using her CPAP up until 2 years ago when she felt that she no longer needed it.    We changed her medications to optimize rate control and followed with a Holter study.  Patient's atrial fibrillation was very well rate controlled on that study.  We also performed an echocardiogram which showed moderate to severe MR with moderately elevated pulmonary pressures.   "She feels well since last clinic visit and is increasing activity as tolerated.  She is also been seen by the sleep clinic and a sleep study was ordered which has not yet been performed.  She denies orthopnea PND or edema.  No dizziness or syncope.      The following portions of the patient's history were reviewed and updated as appropriate: allergies, current medications, past family history, past medical history, past social history, past surgical history and problem list.      Review of Systems   Constitution: Negative for chills and fever.   HENT: Positive for nosebleeds. Negative for hoarse voice and sore throat.    Eyes: Negative for double vision and photophobia.   Cardiovascular: Positive for dyspnea on exertion. Negative for chest pain, leg swelling, near-syncope, orthopnea, palpitations, paroxysmal nocturnal dyspnea and syncope.   Respiratory: Positive for shortness of breath. Negative for cough and wheezing.    Skin: Positive for poor wound healing. Negative for rash.   Musculoskeletal: Negative for arthritis and joint swelling.   Gastrointestinal: Negative for bloating, abdominal pain, hematemesis and hematochezia.   Neurological: Negative for dizziness and focal weakness.   Psychiatric/Behavioral: Negative for depression and suicidal ideas.   All other systems reviewed and are negative.      OBJECTIVE:   Vital Signs  Vitals:    11/18/19 1146   BP: 146/68   Pulse: 84   Resp: 18   SpO2: 98%     Estimated body mass index is 22.87 kg/m² as calculated from the following:    Height as of this encounter: 170.2 cm (67\").    Weight as of this encounter: 66.2 kg (146 lb).    Physical Exam   Constitutional: She is oriented to person, place, and time. She appears well-developed and well-nourished.   HENT:   Head: Normocephalic and atraumatic.   Eyes: Conjunctivae are normal. Pupils are equal, round, and reactive to light.   Neck: No JVD present. No thyromegaly present.   Cardiovascular: Exam reveals no gallop and " no friction rub.   No murmur heard.  Irregular rhythm   Pulmonary/Chest: No respiratory distress. She exhibits no tenderness.   Abdominal: Bowel sounds are normal. She exhibits no distension.   Musculoskeletal: She exhibits no edema or tenderness.   Neurological: She is alert and oriented to person, place, and time.   Skin: No rash noted. No erythema.   Psychiatric: She has a normal mood and affect. Judgment normal.   Vitals reviewed.      Procedures        ASSESSMENT:      Diagnosis Plan   1. Persistent atrial fibrillation     2. Essential hypertension     3. Hyperlipidemia, unspecified hyperlipidemia type           PLAN OF CARE:     1. Persistent atrial fibrillation -patient is doing very well at this time with rate control strategy.  Continue beta-blocker and Xarelto.  2. Valvular heart disease -moderate to severe MR likely related to dilation of left atrium and annular dilatation.  Currently asymptomatic and euvolemic.  Twice daily blood pressure log with aggressive afterload reduction.  3. Hypertension -seemingly well controlled will ask the patient to keep a twice daily blood pressure log.  We have discussed sodium restriction at length.  4. Sleep apnea -following in sleep clinic, await sleep study.    Return to clinic 6 months    Atrial Fibrillation and Atrial Flutter  Assessment  • The patient has persistent atrial fibrillation  • The patient's CHADS2-VASc score is 4  • A [id number] score of 2 or more is considered a high risk for a thromboembolic event  • Rivaroxaban prescribed    Plan  • Continue in atrial fibrillation with rate control  • Continue rivaroxaban for antithrombotic therapy, bleeding issues discussed  • Continue beta blocker for rhythm control    Subjective - Objective  • The average duration of atrial fibrillation episodes is >7 days to 3 months             Discharge Medications           Accurate as of 11/18/19 11:50 AM. If you have any questions, ask your nurse or doctor.                Continue These Medications      Instructions Start Date   alendronate 35 MG tablet  Commonly known as:  FOSAMAX   35 mg, Oral, Every 7 Days      amitriptyline 25 MG tablet  Commonly known as:  ELAVIL   25 mg, Oral, Every Night at Bedtime      anastrozole 1 MG tablet  Commonly known as:  ARIMIDEX   TAKE 1 TABLET EVERY DAY      atorvastatin 10 MG tablet  Commonly known as:  LIPITOR   10 mg, Oral, Daily      Azelastine-Fluticasone 137-50 MCG/ACT suspension   2 sprays, Nasal, 2 Times Daily      CALCIUM 1000 + D 1000-800 MG-UNIT tablet  Generic drug:  Calcium Carb-Cholecalciferol   Oral      chlorhexidine 0.12 % solution  Commonly known as:  PERIDEX   No dose, route, or frequency recorded.      hydroCHLOROthiazide 25 MG tablet  Commonly known as:  HYDRODIURIL   TAKE 1 TABLET EVERY DAY      levothyroxine 75 MCG tablet  Commonly known as:  SYNTHROID, LEVOTHROID   75 mcg, Oral, Daily      metoprolol tartrate 25 MG tablet  Commonly known as:  LOPRESSOR   25 mg, Oral, 2 Times Daily      MULTIVITAMIN ADULT PO   Oral      raloxifene 60 MG tablet  Commonly known as:  EVISTA   60 mg, Oral, Daily      XARELTO 20 MG tablet  Generic drug:  rivaroxaban   TAKE 1 TABLET BY MOUTH ONE TIME A DAY       zolpidem 5 MG tablet  Commonly known as:  AMBIEN   No dose, route, or frequency recorded.             Thank you for allowing me to participate in the care of your patient,      Sincerely,   Lexis Combs MA  [facility]  11/18/19  11:50 AM    **Stan Disclaimer:**  Much of this encounter note is an electronic transcription/translation of spoken language to printed text. The electronic translation of spoken language may permit erroneous, or at times, nonsensical words or phrases to be inadvertently transcribed. Although I have reviewed the note for such errors, some may still exist.

## 2019-12-22 DIAGNOSIS — G62.9 PERIPHERAL POLYNEUROPATHY: ICD-10-CM

## 2019-12-23 RX ORDER — AMITRIPTYLINE HYDROCHLORIDE 25 MG/1
TABLET, FILM COATED ORAL
Qty: 90 TABLET | Refills: 3 | Status: SHIPPED | OUTPATIENT
Start: 2019-12-23 | End: 2020-12-28

## 2019-12-30 DIAGNOSIS — R03.0 ELEVATED BLOOD PRESSURE READING WITHOUT DIAGNOSIS OF HYPERTENSION: ICD-10-CM

## 2019-12-31 RX ORDER — HYDROCHLOROTHIAZIDE 25 MG/1
TABLET ORAL
Qty: 90 TABLET | Refills: 1 | Status: SHIPPED | OUTPATIENT
Start: 2019-12-31 | End: 2020-07-02

## 2020-01-21 ENCOUNTER — TELEPHONE (OUTPATIENT)
Dept: CARDIOLOGY | Facility: CLINIC | Age: 85
End: 2020-01-21

## 2020-01-22 DIAGNOSIS — E03.9 ADULT HYPOTHYROIDISM: ICD-10-CM

## 2020-01-22 RX ORDER — LEVOTHYROXINE SODIUM 0.07 MG/1
TABLET ORAL
Qty: 90 TABLET | Refills: 1 | Status: SHIPPED | OUTPATIENT
Start: 2020-01-22 | End: 2020-03-17

## 2020-02-10 RX ORDER — ANASTROZOLE 1 MG/1
TABLET ORAL
Qty: 90 TABLET | Refills: 1 | Status: SHIPPED | OUTPATIENT
Start: 2020-02-10 | End: 2020-09-02

## 2020-02-11 ENCOUNTER — OFFICE VISIT (OUTPATIENT)
Dept: INTERNAL MEDICINE | Age: 85
End: 2020-02-11

## 2020-02-11 VITALS
HEART RATE: 54 BPM | HEIGHT: 67 IN | OXYGEN SATURATION: 95 % | WEIGHT: 147.2 LBS | TEMPERATURE: 96.5 F | BODY MASS INDEX: 23.1 KG/M2 | SYSTOLIC BLOOD PRESSURE: 140 MMHG | DIASTOLIC BLOOD PRESSURE: 72 MMHG

## 2020-02-11 DIAGNOSIS — I48.19 PERSISTENT ATRIAL FIBRILLATION (HCC): ICD-10-CM

## 2020-02-11 DIAGNOSIS — E78.5 HYPERLIPIDEMIA, UNSPECIFIED HYPERLIPIDEMIA TYPE: Primary | ICD-10-CM

## 2020-02-11 DIAGNOSIS — I10 ESSENTIAL HYPERTENSION: ICD-10-CM

## 2020-02-11 DIAGNOSIS — E03.9 HYPOTHYROIDISM, UNSPECIFIED TYPE: ICD-10-CM

## 2020-02-11 DIAGNOSIS — Z79.01 ANTICOAGULATED: ICD-10-CM

## 2020-02-11 DIAGNOSIS — M81.0 OSTEOPOROSIS, UNSPECIFIED OSTEOPOROSIS TYPE, UNSPECIFIED PATHOLOGICAL FRACTURE PRESENCE: ICD-10-CM

## 2020-02-11 LAB
25(OH)D3+25(OH)D2 SERPL-MCNC: 25.5 NG/ML (ref 30–100)
ALBUMIN SERPL-MCNC: 4 G/DL (ref 3.5–5.2)
ALBUMIN/GLOB SERPL: 1.3 G/DL
ALP SERPL-CCNC: 76 U/L (ref 39–117)
ALT SERPL-CCNC: 15 U/L (ref 1–33)
AST SERPL-CCNC: 25 U/L (ref 1–32)
BASOPHILS # BLD AUTO: 0.07 10*3/MM3 (ref 0–0.2)
BASOPHILS NFR BLD AUTO: 1.2 % (ref 0–1.5)
BILIRUB SERPL-MCNC: 0.6 MG/DL (ref 0.2–1.2)
BUN SERPL-MCNC: 12 MG/DL (ref 8–23)
BUN/CREAT SERPL: 15.4 (ref 7–25)
CALCIUM SERPL-MCNC: 9 MG/DL (ref 8.6–10.5)
CHLORIDE SERPL-SCNC: 97 MMOL/L (ref 98–107)
CHOLEST SERPL-MCNC: 159 MG/DL (ref 0–200)
CHOLEST/HDLC SERPL: 2.45 {RATIO}
CO2 SERPL-SCNC: 31.7 MMOL/L (ref 22–29)
CREAT SERPL-MCNC: 0.78 MG/DL (ref 0.57–1)
EOSINOPHIL # BLD AUTO: 0.22 10*3/MM3 (ref 0–0.4)
EOSINOPHIL NFR BLD AUTO: 3.7 % (ref 0.3–6.2)
ERYTHROCYTE [DISTWIDTH] IN BLOOD BY AUTOMATED COUNT: 13.4 % (ref 12.3–15.4)
GLOBULIN SER CALC-MCNC: 3 GM/DL
GLUCOSE SERPL-MCNC: 110 MG/DL (ref 65–99)
HCT VFR BLD AUTO: 35.9 % (ref 34–46.6)
HDLC SERPL-MCNC: 65 MG/DL (ref 40–60)
HGB BLD-MCNC: 12.3 G/DL (ref 12–15.9)
IMM GRANULOCYTES # BLD AUTO: 0.01 10*3/MM3 (ref 0–0.05)
IMM GRANULOCYTES NFR BLD AUTO: 0.2 % (ref 0–0.5)
LDLC SERPL CALC-MCNC: 75 MG/DL (ref 0–100)
LYMPHOCYTES # BLD AUTO: 2.26 10*3/MM3 (ref 0.7–3.1)
LYMPHOCYTES NFR BLD AUTO: 37.9 % (ref 19.6–45.3)
MCH RBC QN AUTO: 31.9 PG (ref 26.6–33)
MCHC RBC AUTO-ENTMCNC: 34.3 G/DL (ref 31.5–35.7)
MCV RBC AUTO: 93 FL (ref 79–97)
MONOCYTES # BLD AUTO: 0.62 10*3/MM3 (ref 0.1–0.9)
MONOCYTES NFR BLD AUTO: 10.4 % (ref 5–12)
NEUTROPHILS # BLD AUTO: 2.78 10*3/MM3 (ref 1.7–7)
NEUTROPHILS NFR BLD AUTO: 46.6 % (ref 42.7–76)
NRBC BLD AUTO-RTO: 0 /100 WBC (ref 0–0.2)
PLATELET # BLD AUTO: 222 10*3/MM3 (ref 140–450)
POTASSIUM SERPL-SCNC: 3.4 MMOL/L (ref 3.5–5.2)
PROT SERPL-MCNC: 7 G/DL (ref 6–8.5)
RBC # BLD AUTO: 3.86 10*6/MM3 (ref 3.77–5.28)
SODIUM SERPL-SCNC: 139 MMOL/L (ref 136–145)
T4 FREE SERPL-MCNC: 1.45 NG/DL (ref 0.93–1.7)
TRIGL SERPL-MCNC: 94 MG/DL (ref 0–150)
TSH SERPL DL<=0.005 MIU/L-ACNC: 11.4 UIU/ML (ref 0.27–4.2)
VLDLC SERPL CALC-MCNC: 18.8 MG/DL
WBC # BLD AUTO: 5.96 10*3/MM3 (ref 3.4–10.8)

## 2020-02-11 PROCEDURE — 99214 OFFICE O/P EST MOD 30 MIN: CPT | Performed by: NURSE PRACTITIONER

## 2020-02-11 NOTE — PROGRESS NOTES
[facility]  Helen Quinn / [age] y.o. / female  02/11/2020      ASSESSMENT & PLAN:    Problem List Items Addressed This Visit        Cardiovascular and Mediastinum    HLD (hyperlipidemia) - Primary    Relevant Medications    atorvastatin (LIPITOR) 10 MG tablet    Other Relevant Orders    Lipid Panel With / Chol / HDL Ratio    Hypertension    Relevant Medications    metoprolol tartrate (LOPRESSOR) 25 MG tablet    hydroCHLOROthiazide (HYDRODIURIL) 25 MG tablet    Other Relevant Orders    Comprehensive Metabolic Panel    Persistent atrial fibrillation    Relevant Medications    metoprolol tartrate (LOPRESSOR) 25 MG tablet    Other Relevant Orders    CBC & Differential       Endocrine    Hypothyroidism    Relevant Medications    metoprolol tartrate (LOPRESSOR) 25 MG tablet    levothyroxine (SYNTHROID, LEVOTHROID) 75 MCG tablet    Other Relevant Orders    TSH+Free T4       Musculoskeletal and Integument    Osteoporosis    Overview     Overview:   decrease in Vit D         Relevant Orders    Vitamin D 25 Hydroxy      Other Visit Diagnoses     Anticoagulated        Relevant Orders    CBC & Differential        Orders Placed This Encounter   Procedures   • Comprehensive Metabolic Panel   • Lipid Panel With / Chol / HDL Ratio   • TSH+Free T4   • Vitamin D 25 Hydroxy   • CBC & Differential     No orders of the defined types were placed in this encounter.      Summary/Discussion:    Patient out of date on multiple screenings, including DEXA scan, mammogram, and colonoscopy. Instructed patient to call scheduling regarding DEXA and mammogram, follow up with Dr. Linton GI office for colonoscopy (due Dec 2018).     1. Hyperlipidemia, unspecified hyperlipidemia type  Check fasting labs today and adjust dosage of medication as necessary.    - Lipid Panel With / Chol / HDL Ratio    2. Essential hypertension  Blood pressure stable on current therapy, continue same.  Check labs today and adjust dosage  of medication as necessary.  Follow-up 4 months.    - Comprehensive Metabolic Panel    3. Persistent atrial fibrillation    - CBC & Differential    4. Osteoporosis, unspecified osteoporosis type, unspecified pathological fracture presence  Needs updated DEXA scan as ordered by oncologist.     - Vitamin D 25 Hydroxy    5. Hypothyroidism, unspecified type    - TSH+Free T4    6. Anticoagulated    - CBC & Differential        Return in about 4 months (around 6/11/2020) for Next scheduled follow up.  ____________________________________________________________________    MEDICATIONS  Current Outpatient Medications   Medication Sig Dispense Refill   • alendronate (FOSAMAX) 35 MG tablet Take 1 tablet by mouth Every 7 (Seven) Days. 12 tablet 3   • amitriptyline (ELAVIL) 25 MG tablet TAKE 1 TABLET BY MOUTH AT BEDTIME  90 tablet 3   • anastrozole (ARIMIDEX) 1 MG tablet TAKE 1 TABLET EVERY DAY 90 tablet 1   • atorvastatin (LIPITOR) 10 MG tablet Take 1 tablet by mouth Daily. 90 tablet 1   • Azelastine-Fluticasone 137-50 MCG/ACT suspension 2 sprays into each nostril 2 (Two) Times a Day. 1 bottle 1   • Calcium Carb-Cholecalciferol (CALCIUM 1000 + D) 1000-800 MG-UNIT tablet Take  by mouth.     • chlorhexidine (PERIDEX) 0.12 % solution      • hydroCHLOROthiazide (HYDRODIURIL) 25 MG tablet TAKE 1 TABLET EVERY DAY 90 tablet 1   • levothyroxine (SYNTHROID, LEVOTHROID) 75 MCG tablet TAKE 1 TABLET EVERY DAY 90 tablet 1   • metoprolol tartrate (LOPRESSOR) 25 MG tablet Take 1 tablet by mouth 2 (Two) Times a Day. 60 tablet 2   • Multiple Vitamins-Minerals (MULTIVITAMIN ADULT PO) Take  by mouth.     • neomycin-polymyxin-hydrocortisone (CORTISPORIN) 3.5-80562-2 otic solution Administer 3 drops to the right ear 4 (Four) Times a Day. 10 mL 0   • raloxifene (EVISTA) 60 MG tablet Take 1 tablet by mouth Daily. 30 tablet 6   • XARELTO 20 MG tablet TAKE 1 TABLET BY MOUTH ONE TIME A DAY  30 tablet 4   • zolpidem (AMBIEN) 5 MG tablet        No current  "facility-administered medications for this visit.        VITALS    Visit Vitals  /72   Pulse 54   Temp 96.5 °F (35.8 °C)   Ht 170.2 cm (67.01\")   Wt 66.8 kg (147 lb 3.2 oz)   SpO2 95%   BMI 23.05 kg/m²       BP Readings from Last 3 Encounters:   02/11/20 140/72   12/15/19 160/84   11/18/19 146/68     Wt Readings from Last 3 Encounters:   02/11/20 66.8 kg (147 lb 3.2 oz)   11/18/19 66.2 kg (146 lb)   09/24/19 65.3 kg (144 lb)      Body mass index is 23.05 kg/m².    CC:  Main reason(s) for today's visit: Follow-up for hypertension and hyperlipidemia    HPI:   Chronic essential hypertension:  Since prior visit: compliant with medication(s), does not check blood pressure at home and denies significant problems with medication(s). Currently taking hydrochlorothiazide (HCTZ) and metoprolol (Lopressor, Toprol). She is not exercising, but is adherent to low sodium diet.  She denies symptoms of chest pain/pressure, dyspnea, swelling, vision impairment. CVD risk factors include: advanced age (>55 for males, >65 for females), dyslipidemia, HTN, obesity (BMI>=30 kg/m2), and sedentary lifestyle. Use of agents associated with HTN: no alcohol use and no tobacco use . Most recent in-office blood pressure readings:   She has not yet taken BP medication today.     BP Readings from Last 3 Encounters:   02/11/20 140/72   12/15/19 160/84   11/18/19 146/68       Chronic hyperlipidemia:  Current therapy include atorvastatin.    Most recent labs:   Lab Results   Component Value Date     (H) 06/25/2018    LDL 82 01/04/2017    LDL 78 10/18/2016    HDL 63 (H) 06/25/2018    HDL 67 (H) 01/04/2017    HDL 53 10/18/2016    TRIG 85 06/25/2018        Recently diagnosed with atrial fibrillation. Currently on Eliquis, denies any adverse reaction or bleeding r/t use of anticoagulation.     Hypothyroidism: Patient presents for evaluation of thyroid function. Symptoms consist of denies fatigue, weight changes, heat/cold intolerance, " bowel/skin changes or CVS symptoms.       Patient Care Team:  Helen Dean APRN as PCP - General (Internal Medicine)  Marla Lyons MD as PCP - Claims Attributed  Marla Lyons MD as Consulting Physician (Hematology and Oncology)  Gregor Bagley MD as Referring Physician (Breast Surgery)  Kyra Sage MD as Consulting Physician (Radiation Oncology)  Dorene Malloy MD as Consulting Physician (Ophthalmology)  Luke Linton MD as Consulting Physician (Gastroenterology)  Blake Lara Jr., MD as Consulting Physician (Cardiology)  ____________________________________________________________________      REVIEW OF SYSTEMS    Review of Systems   Constitutional: Negative for activity change, appetite change and unexpected weight change.   HENT: Negative for tinnitus.    Eyes: Negative for visual disturbance.   Respiratory: Negative for cough, chest tightness and shortness of breath.    Cardiovascular: Negative for chest pain, palpitations and leg swelling.   Neurological: Negative for dizziness, light-headedness and headaches.         PHYSICAL EXAMINATION    Physical Exam   Constitutional: She is oriented to person, place, and time. Vital signs are normal. She appears well-developed and well-nourished. She is cooperative. She does not appear ill. No distress.   HENT:   Right Ear: Decreased hearing is noted.   Left Ear: Decreased hearing is noted.   Cardiovascular: Normal rate, S1 normal, S2 normal and normal heart sounds. An irregular rhythm present.   No murmur heard.  Pulmonary/Chest: Effort normal and breath sounds normal. She has no decreased breath sounds. She has no wheezes. She has no rhonchi. She has no rales.   Neurological: She is alert and oriented to person, place, and time.   Skin: Skin is warm, dry and intact.   Psychiatric: She has a normal mood and affect. Her speech is normal and behavior is normal. Judgment and thought content normal. Cognition and memory are normal.    Nursing note and vitals reviewed.      REVIEWED DATA:    Labs:   Lab Results   Component Value Date     08/07/2019    K 3.1 (L) 08/07/2019    AST 22 04/09/2019    ALT 16 04/09/2019    BUN 14 08/07/2019    CREATININE 0.67 08/07/2019    CREATININE 0.68 04/09/2019    CREATININE 0.76 02/05/2019    EGFRIFNONA 83 08/07/2019    EGFRIFAFRI 87 02/05/2019       Lab Results   Component Value Date    GLUCOSE 84 08/07/2019    GLUCOSE 91 04/09/2019    GLUCOSE 128 (H) 10/23/2018       Lab Results   Component Value Date     (H) 06/25/2018    LDL 82 01/04/2017    LDL 78 10/18/2016    HDL 63 (H) 06/25/2018    HDL 67 (H) 01/04/2017    HDL 53 10/18/2016    TRIG 85 06/25/2018    TRIG 148 01/04/2017    TRIG 127 10/18/2016       Lab Results   Component Value Date    TSH 3.440 08/07/2019          Lab Results   Component Value Date    WBC 6.17 09/24/2019    HGB 14.0 09/24/2019    HGB 13.3 08/07/2019    HGB 12.6 04/09/2019     09/24/2019       Lab Results   Component Value Date    LEUKOCYTESUR Moderate (2+) (A) 08/07/2019       Imaging:        Medical Tests:        Summary of old records / correspondence / consultant report:        Request outside records:        ALLERGIES  No Known Allergies     PFSH:     The following portions of the patient's history were reviewed and updated as appropriate: Allergies / Current Medications / Past Medical History / Surgical History / Social History / Family History    PROBLEM LIST   Patient Active Problem List   Diagnosis   • Breast CA (CMS/HCC)   • Hypothyroidism   • HLD (hyperlipidemia)   • Routine health maintenance   • Imbalance   • Altered bowel habits   • Colon polyp, hyperplastic   • Colon polyps   • Depression   • Diverticulitis of intestine   • Osteoarthritis   • Hearing loss   • Hypertension   • NISHI (obstructive sleep apnea)   • Osteoporosis   • Urinary incontinence   • Medicare annual wellness visit, initial   • Encounter for immunization   • Peripheral polyneuropathy   •  Vaginal discharge   • Osteopenia of both lower legs   • Fibroids, submucosal   • Persistent atrial fibrillation       PAST MEDICAL HISTORY  Past Medical History:   Diagnosis Date   • Breast cancer (CMS/HCC)    • Cancer (CMS/HCC)     Left breast cancer, [id number] invasive ductal carcinoma   • Cataract    • Colon polyp, hyperplastic 2016   • Depression    • Disease of thyroid gland    • Glaucoma    • [place] (hard of hearing)    • Hyperlipidemia    • Hypertension    • Osteopenia    • Radiation    • Sleep apnea        SURGICAL HISTORY  Past Surgical History:   Procedure Laterality Date   • BREAST BIOPSY     • BREAST LUMPECTOMY      left side   • CATARACT EXTRACTION     •  SECTION      x3   • COLONOSCOPY     • COLONOSCOPY N/A 2016    Procedure:  colonoscopy into cecum with saline injection, hot snare polypectomy, APC used in ascending polyp area;  Surgeon: Luke Linton MD;  Location: [facility] ENDOSCOPY;  Service:    • COLONOSCOPY N/A 2016    Procedure: COLONOSCOPY into cecum with polypectomies and methylene blue inj and saline inj. (5 cc.);  Surgeon: Luke Linton MD;  Location: [facility] ENDOSCOPY;  Service:    • COLONOSCOPY W/ BIOPSIES     • MYOMECTOMY  2017    Partial       SOCIAL HISTORY  Social History     Socioeconomic History   • Marital status:      Spouse name: Gregor   • Number of children: 3   • Years of education: Not on file   • Highest education level: Not on file   Occupational History   • Occupation: Office     Employer: RETIRED   Tobacco Use   • Smoking status: Never Smoker   • Smokeless tobacco: Never Used   Substance and Sexual Activity   • Alcohol use: No   • Drug use: No   • Sexual activity: Defer   Social History Narrative           FAMILY HISTORY  Family History   Problem Relation Age of Onset   • Colon cancer Mother    • Cancer Mother    • Coronary artery disease Father    • Heart attack Father    • Heart disease Father    • No Known Problems  Brother

## 2020-02-12 DIAGNOSIS — E03.9 HYPOTHYROIDISM, UNSPECIFIED TYPE: ICD-10-CM

## 2020-02-12 DIAGNOSIS — E87.6 HYPOKALEMIA: Primary | ICD-10-CM

## 2020-02-12 RX ORDER — POTASSIUM CHLORIDE 1500 MG/1
20 TABLET, FILM COATED, EXTENDED RELEASE ORAL DAILY
Qty: 90 TABLET | Refills: 1 | Status: SHIPPED | OUTPATIENT
Start: 2020-02-12 | End: 2021-02-05

## 2020-03-12 ENCOUNTER — RESULTS ENCOUNTER (OUTPATIENT)
Dept: INTERNAL MEDICINE | Age: 85
End: 2020-03-12

## 2020-03-12 DIAGNOSIS — E03.9 HYPOTHYROIDISM, UNSPECIFIED TYPE: ICD-10-CM

## 2020-03-12 DIAGNOSIS — E87.6 HYPOKALEMIA: ICD-10-CM

## 2020-03-16 LAB
BUN SERPL-MCNC: 17 MG/DL (ref 8–23)
BUN/CREAT SERPL: 22.4 (ref 7–25)
CALCIUM SERPL-MCNC: 8.9 MG/DL (ref 8.6–10.5)
CHLORIDE SERPL-SCNC: 97 MMOL/L (ref 98–107)
CO2 SERPL-SCNC: 31.7 MMOL/L (ref 22–29)
CREAT SERPL-MCNC: 0.76 MG/DL (ref 0.57–1)
GLUCOSE SERPL-MCNC: 104 MG/DL (ref 65–99)
POTASSIUM SERPL-SCNC: 3.6 MMOL/L (ref 3.5–5.2)
SODIUM SERPL-SCNC: 139 MMOL/L (ref 136–145)
T4 FREE SERPL-MCNC: 1.51 NG/DL (ref 0.93–1.7)
TSH SERPL DL<=0.005 MIU/L-ACNC: 8.75 UIU/ML (ref 0.27–4.2)

## 2020-03-17 DIAGNOSIS — E03.9 ADULT HYPOTHYROIDISM: ICD-10-CM

## 2020-03-17 RX ORDER — LEVOTHYROXINE SODIUM 88 UG/1
88 TABLET ORAL DAILY
Qty: 90 TABLET | Refills: 3 | Status: SHIPPED | OUTPATIENT
Start: 2020-03-17 | End: 2020-08-12 | Stop reason: DRUGHIGH

## 2020-03-31 ENCOUNTER — APPOINTMENT (OUTPATIENT)
Dept: LAB | Facility: HOSPITAL | Age: 85
End: 2020-03-31

## 2020-05-08 ENCOUNTER — TELEPHONE (OUTPATIENT)
Dept: CARDIOLOGY | Facility: CLINIC | Age: 85
End: 2020-05-08

## 2020-05-11 ENCOUNTER — APPOINTMENT (OUTPATIENT)
Dept: BONE DENSITY | Facility: HOSPITAL | Age: 85
End: 2020-05-11

## 2020-05-18 ENCOUNTER — APPOINTMENT (OUTPATIENT)
Dept: LAB | Facility: HOSPITAL | Age: 85
End: 2020-05-18

## 2020-06-01 ENCOUNTER — DOCUMENTATION (OUTPATIENT)
Dept: ONCOLOGY | Facility: HOSPITAL | Age: 85
End: 2020-06-01

## 2020-06-01 ENCOUNTER — TELEPHONE (OUTPATIENT)
Dept: ONCOLOGY | Facility: HOSPITAL | Age: 85
End: 2020-06-01

## 2020-06-01 NOTE — PROGRESS NOTES
Pharmacy sent refill request for patient for Evista  Per Dr Marti notes, pt should be on Arimidex.  Discussed with Dr Marti and she conferred that pt is to be on arimidex  Spoke with pharmacy and told them not to refill RX for evista  Attempted to call patient multiple times, phone rang and rang, no answer and no VM  Previous RX for arimidex sent to patient mail order pharmacy on 02/10/20  Discussed with Dr marti about inability to contact patiradha

## 2020-06-05 ENCOUNTER — TELEPHONE (OUTPATIENT)
Dept: ONCOLOGY | Facility: CLINIC | Age: 85
End: 2020-06-05

## 2020-06-05 ENCOUNTER — TELEPHONE (OUTPATIENT)
Dept: ONCOLOGY | Facility: HOSPITAL | Age: 85
End: 2020-06-05

## 2020-06-05 ENCOUNTER — DOCUMENTATION (OUTPATIENT)
Dept: PHARMACY | Facility: HOSPITAL | Age: 85
End: 2020-06-05

## 2020-06-05 NOTE — PROGRESS NOTES
Pharmacy faxed another request for Evista in. Called pharmacy and verbally denied the prescribed and told pharmacist to have the pt call and speak to triage about why it was denied. See Sue's note.

## 2020-06-05 NOTE — TELEPHONE ENCOUNTER
Pt called requesting a refill on Evista. Per previous note, pt is to be taking Arimidex. I asked the pt to get out all of her medications and she has been taking both evista 60mg and Arimidex 1mg for years. Message sent to Dr. Lyons to find out which one she prefer the pt continue taking. Pt is out of evista at this time so I asked her to continue with Arimidex until we hear from Dr. Lyons. Pt v/u

## 2020-07-01 ENCOUNTER — TRANSCRIBE ORDERS (OUTPATIENT)
Dept: ADMINISTRATIVE | Facility: HOSPITAL | Age: 85
End: 2020-07-01

## 2020-07-01 DIAGNOSIS — Z12.31 VISIT FOR SCREENING MAMMOGRAM: Primary | ICD-10-CM

## 2020-07-02 ENCOUNTER — APPOINTMENT (OUTPATIENT)
Dept: LAB | Facility: HOSPITAL | Age: 85
End: 2020-07-02

## 2020-07-02 DIAGNOSIS — R03.0 ELEVATED BLOOD PRESSURE READING WITHOUT DIAGNOSIS OF HYPERTENSION: ICD-10-CM

## 2020-07-02 RX ORDER — HYDROCHLOROTHIAZIDE 25 MG/1
TABLET ORAL
Qty: 90 TABLET | Refills: 1 | Status: SHIPPED | OUTPATIENT
Start: 2020-07-02 | End: 2020-12-21

## 2020-08-03 ENCOUNTER — HOSPITAL ENCOUNTER (OUTPATIENT)
Dept: BONE DENSITY | Facility: HOSPITAL | Age: 85
Discharge: HOME | End: 2020-08-03
Admitting: INTERNAL MEDICINE

## 2020-08-03 DIAGNOSIS — M81.0 OSTEOPOROSIS, UNSPECIFIED OSTEOPOROSIS TYPE, UNSPECIFIED PATHOLOGICAL FRACTURE PRESENCE: ICD-10-CM

## 2020-08-03 DIAGNOSIS — C50.919 MALIGNANT NEOPLASM OF FEMALE BREAST, UNSPECIFIED ESTROGEN RECEPTOR STATUS, UNSPECIFIED LATERALITY, UNSPECIFIED SITE OF BREAST (HCC): ICD-10-CM

## 2020-08-03 PROCEDURE — 77080 DXA BONE DENSITY AXIAL: CPT

## 2020-08-11 ENCOUNTER — OFFICE VISIT (OUTPATIENT)
Dept: INTERNAL MEDICINE | Age: 85
End: 2020-08-11

## 2020-08-11 VITALS
BODY MASS INDEX: 22.76 KG/M2 | DIASTOLIC BLOOD PRESSURE: 80 MMHG | TEMPERATURE: 96.8 F | WEIGHT: 145 LBS | SYSTOLIC BLOOD PRESSURE: 142 MMHG | HEART RATE: 70 BPM | OXYGEN SATURATION: 95 % | HEIGHT: 67 IN

## 2020-08-11 DIAGNOSIS — M81.0 OSTEOPOROSIS, UNSPECIFIED OSTEOPOROSIS TYPE, UNSPECIFIED PATHOLOGICAL FRACTURE PRESENCE: ICD-10-CM

## 2020-08-11 DIAGNOSIS — R09.89 OTHER SPECIFIED SYMPTOMS AND SIGNS INVOLVING THE CIRCULATORY AND RESPIRATORY SYSTEMS: ICD-10-CM

## 2020-08-11 DIAGNOSIS — E55.9 VITAMIN D DEFICIENCY: ICD-10-CM

## 2020-08-11 DIAGNOSIS — R20.9 BILATERAL COLD FEET: ICD-10-CM

## 2020-08-11 DIAGNOSIS — I10 ESSENTIAL HYPERTENSION: ICD-10-CM

## 2020-08-11 DIAGNOSIS — L53.9 RUBOR: ICD-10-CM

## 2020-08-11 DIAGNOSIS — F43.21 GRIEF: ICD-10-CM

## 2020-08-11 DIAGNOSIS — E78.5 HYPERLIPIDEMIA, UNSPECIFIED HYPERLIPIDEMIA TYPE: Primary | ICD-10-CM

## 2020-08-11 DIAGNOSIS — E03.9 HYPOTHYROIDISM, UNSPECIFIED TYPE: ICD-10-CM

## 2020-08-11 PROCEDURE — 99214 OFFICE O/P EST MOD 30 MIN: CPT | Performed by: NURSE PRACTITIONER

## 2020-08-11 NOTE — PATIENT INSTRUCTIONS
Peripheral Vascular Disease    Peripheral vascular disease (PVD) is a disease of the blood vessels that are not part of your heart and brain. A simple term for PVD is poor circulation. In most cases, PVD narrows the blood vessels that carry blood from your heart to the rest of your body. This can reduce the supply of blood to your arms, legs, and internal organs, like your stomach or kidneys. However, PVD most often affects a person’s lower legs and feet. Without treatment, PVD tends to get worse.  PVD can also lead to acute ischemic limb. This is when an arm or leg suddenly cannot get enough blood. This is a medical emergency.  Follow these instructions at home:  Lifestyle  · Do not use any products that contain nicotine or tobacco, such as cigarettes and e-cigarettes. If you need help quitting, ask your doctor.  · Lose weight if you are overweight. Or, stay at a healthy weight as told by your doctor.  · Eat a diet that is low in fat and cholesterol. If you need help, ask your doctor.  · Exercise regularly. Ask your doctor for activities that are right for you.  General instructions  · Take over-the-counter and prescription medicines only as told by your doctor.  · Take good care of your feet:  ? Wear comfortable shoes that fit well.  ? Check your feet often for any cuts or sores.  · Keep all follow-up visits as told by your doctor This is important.  Contact a doctor if:  · You have cramps in your legs when you walk.  · You have leg pain when you are at rest.  · You have coldness in a leg or foot.  · Your skin changes.  · You are unable to get or have an erection (erectile dysfunction).  · You have cuts or sores on your feet that do not heal.  Get help right away if:  · Your arm or leg turns cold, numb, and blue.  · Your arms or legs become red, warm, swollen, painful, or numb.  · You have chest pain.  · You have trouble breathing.  · You suddenly have weakness in your face, arm, or leg.  · You become very  confused or you cannot speak.  · You suddenly have a very bad headache.  · You suddenly cannot see.  Summary  · Peripheral vascular disease (PVD) is a disease of the blood vessels.  · A simple term for PVD is poor circulation. Without treatment, PVD tends to get worse.  · Treatment may include exercise, low fat and low cholesterol diet, and quitting smoking.  This information is not intended to replace advice given to you by your health care provider. Make sure you discuss any questions you have with your health care provider.  Document Released: 03/14/2011 Document Revised: 11/30/2018 Document Reviewed: 01/25/2018  Elsevier Patient Education © 2020 [employer]

## 2020-08-12 DIAGNOSIS — E03.9 HYPOTHYROIDISM, UNSPECIFIED TYPE: Primary | ICD-10-CM

## 2020-08-12 LAB
25(OH)D3+25(OH)D2 SERPL-MCNC: 34.4 NG/ML (ref 30–100)
ALBUMIN SERPL-MCNC: 4.4 G/DL (ref 3.5–5.2)
ALBUMIN/GLOB SERPL: 1.7 G/DL
ALP SERPL-CCNC: 86 U/L (ref 39–117)
ALT SERPL-CCNC: 14 U/L (ref 1–33)
AST SERPL-CCNC: 23 U/L (ref 1–32)
BILIRUB SERPL-MCNC: 1 MG/DL (ref 0–1.2)
BUN SERPL-MCNC: 12 MG/DL (ref 8–23)
BUN/CREAT SERPL: 16 (ref 7–25)
CALCIUM SERPL-MCNC: 9.2 MG/DL (ref 8.6–10.5)
CHLORIDE SERPL-SCNC: 95 MMOL/L (ref 98–107)
CO2 SERPL-SCNC: 31.4 MMOL/L (ref 22–29)
CREAT SERPL-MCNC: 0.75 MG/DL (ref 0.57–1)
GLOBULIN SER CALC-MCNC: 2.6 GM/DL
GLUCOSE SERPL-MCNC: 106 MG/DL (ref 65–99)
POTASSIUM SERPL-SCNC: 3.6 MMOL/L (ref 3.5–5.2)
PROT SERPL-MCNC: 7 G/DL (ref 6–8.5)
SODIUM SERPL-SCNC: 136 MMOL/L (ref 136–145)
T4 FREE SERPL-MCNC: 2.09 NG/DL (ref 0.93–1.7)
TSH SERPL DL<=0.005 MIU/L-ACNC: 0.73 UIU/ML (ref 0.27–4.2)

## 2020-08-12 RX ORDER — LEVOTHYROXINE SODIUM 0.07 MG/1
75 TABLET ORAL DAILY
Qty: 90 TABLET | Refills: 1 | Status: SHIPPED | OUTPATIENT
Start: 2020-08-12 | End: 2020-12-21

## 2020-08-21 ENCOUNTER — APPOINTMENT (OUTPATIENT)
Dept: CARDIOLOGY | Facility: HOSPITAL | Age: 85
End: 2020-08-21

## 2020-08-27 ENCOUNTER — HOSPITAL ENCOUNTER (OUTPATIENT)
Dept: MAMMOGRAPHY | Facility: HOSPITAL | Age: 85
Discharge: HOME | End: 2020-08-27
Admitting: INTERNAL MEDICINE

## 2020-08-27 DIAGNOSIS — Z12.31 VISIT FOR SCREENING MAMMOGRAM: ICD-10-CM

## 2020-08-27 PROCEDURE — 77067 SCR MAMMO BI INCL CAD: CPT

## 2020-08-27 PROCEDURE — 77063 BREAST TOMOSYNTHESIS BI: CPT

## 2020-08-28 ENCOUNTER — HOSPITAL ENCOUNTER (OUTPATIENT)
Dept: CARDIOLOGY | Facility: HOSPITAL | Age: 85
Discharge: HOME | End: 2020-08-28
Admitting: NURSE PRACTITIONER

## 2020-08-28 LAB
BH CV LOWER ARTERIAL LEFT ABI RATIO: 1.2
BH CV LOWER ARTERIAL LEFT DORSALIS PEDIS SYS MAX: 146 MMHG
BH CV LOWER ARTERIAL LEFT GREAT TOE SYS MAX: 161 MMHG
BH CV LOWER ARTERIAL LEFT POST TIBIAL SYS MAX: 166 MMHG
BH CV LOWER ARTERIAL LEFT TBI RATIO: 1.2
BH CV LOWER ARTERIAL RIGHT ABI RATIO: 1.2
BH CV LOWER ARTERIAL RIGHT DORSALIS PEDIS SYS MAX: 152 MMHG
BH CV LOWER ARTERIAL RIGHT GREAT TOE SYS MAX: 129 MMHG
BH CV LOWER ARTERIAL RIGHT POST TIBIAL SYS MAX: 166 MMHG
BH CV LOWER ARTERIAL RIGHT TBI RATIO: 1
UPPER ARTERIAL RIGHT ARM BRACHIAL SYS MAX: 129 MMHG

## 2020-08-28 PROCEDURE — 93922 UPR/L XTREMITY ART 2 LEVELS: CPT

## 2020-09-02 ENCOUNTER — TELEPHONE (OUTPATIENT)
Dept: ONCOLOGY | Facility: CLINIC | Age: 85
End: 2020-09-02

## 2020-09-02 RX ORDER — ANASTROZOLE 1 MG/1
TABLET ORAL
Qty: 90 TABLET | Refills: 0 | Status: SHIPPED | OUTPATIENT
Start: 2020-09-02 | End: 2020-09-09 | Stop reason: SDUPTHER

## 2020-09-02 NOTE — TELEPHONE ENCOUNTER
----- Message from Anjelica Delaney sent at 9/2/2020 11:00 AM EDT -----  Please contact pt to schedule an appointment with Dr WEISS. Pt has cancelled last 2 appointments. Thanks Anjelica.

## 2020-09-08 DIAGNOSIS — E78.5 HYPERLIPIDEMIA, UNSPECIFIED HYPERLIPIDEMIA TYPE: ICD-10-CM

## 2020-09-09 ENCOUNTER — TELEPHONE (OUTPATIENT)
Dept: ONCOLOGY | Facility: CLINIC | Age: 85
End: 2020-09-09

## 2020-09-09 RX ORDER — RIVAROXABAN 20 MG/1
TABLET, FILM COATED ORAL
Qty: 90 TABLET | Refills: 0 | Status: SHIPPED | OUTPATIENT
Start: 2020-09-09 | End: 2020-12-22 | Stop reason: SDUPTHER

## 2020-09-09 RX ORDER — ALENDRONATE SODIUM 35 MG/1
TABLET ORAL
Qty: 12 TABLET | Refills: 3 | Status: SHIPPED | OUTPATIENT
Start: 2020-09-09 | End: 2021-09-13

## 2020-09-09 RX ORDER — ATORVASTATIN CALCIUM 10 MG/1
TABLET, FILM COATED ORAL
Qty: 90 TABLET | Refills: 1 | Status: SHIPPED | OUTPATIENT
Start: 2020-09-09 | End: 2020-12-28

## 2020-09-09 RX ORDER — ANASTROZOLE 1 MG/1
1 TABLET ORAL DAILY
Qty: 14 TABLET | Refills: 0 | Status: SHIPPED | OUTPATIENT
Start: 2020-09-09 | End: 2020-12-29 | Stop reason: SINTOL

## 2020-09-09 NOTE — TELEPHONE ENCOUNTER
Patient's asking for a RX for medication anastrozole (ARIMIDEX) 1 MG tablet to be called in to [facility] pharmacy. Sh spoke with [employer] who informed her that the medication is supposedly on the way but the shipment is delayed. She's been 2 day without this medication already and wanting to know if a small amount could be called in to [facility] until she receives her supply through [employer].    Pharmacy: [id number] [address] [phone]  - [phone] FX       Patient's # [phone]

## 2020-10-12 ENCOUNTER — RESULTS ENCOUNTER (OUTPATIENT)
Dept: INTERNAL MEDICINE | Age: 85
End: 2020-10-12

## 2020-10-12 DIAGNOSIS — E03.9 HYPOTHYROIDISM, UNSPECIFIED TYPE: ICD-10-CM

## 2020-10-27 ENCOUNTER — OFFICE VISIT (OUTPATIENT)
Dept: ONCOLOGY | Facility: CLINIC | Age: 85
End: 2020-10-27

## 2020-10-27 ENCOUNTER — LAB (OUTPATIENT)
Dept: LAB | Facility: HOSPITAL | Age: 85
End: 2020-10-27

## 2020-10-27 VITALS
HEART RATE: 87 BPM | TEMPERATURE: 97.1 F | SYSTOLIC BLOOD PRESSURE: 141 MMHG | RESPIRATION RATE: 14 BRPM | OXYGEN SATURATION: 97 % | WEIGHT: 145.9 LBS | HEIGHT: 67 IN | BODY MASS INDEX: 22.9 KG/M2 | DIASTOLIC BLOOD PRESSURE: 84 MMHG

## 2020-10-27 DIAGNOSIS — C50.919 MALIGNANT NEOPLASM OF FEMALE BREAST, UNSPECIFIED ESTROGEN RECEPTOR STATUS, UNSPECIFIED LATERALITY, UNSPECIFIED SITE OF BREAST (HCC): Primary | ICD-10-CM

## 2020-10-27 LAB
ALBUMIN SERPL-MCNC: 4.1 G/DL (ref 3.5–5.2)
ALBUMIN/GLOB SERPL: 1.3 G/DL (ref 1.1–2.4)
ALP SERPL-CCNC: 79 U/L (ref 38–116)
ALT SERPL W P-5'-P-CCNC: 13 U/L (ref 0–33)
ANION GAP SERPL CALCULATED.3IONS-SCNC: 9.9 MMOL/L (ref 5–15)
AST SERPL-CCNC: 25 U/L (ref 0–32)
BASOPHILS # BLD AUTO: 0.07 10*3/MM3 (ref 0–0.2)
BASOPHILS NFR BLD AUTO: 1.1 % (ref 0–1.5)
BILIRUB SERPL-MCNC: 0.5 MG/DL (ref 0.2–1.2)
BUN SERPL-MCNC: 15 MG/DL (ref 6–20)
BUN/CREAT SERPL: 20.5 (ref 7.3–30)
CALCIUM SPEC-SCNC: 9.6 MG/DL (ref 8.5–10.2)
CHLORIDE SERPL-SCNC: 97 MMOL/L (ref 98–107)
CO2 SERPL-SCNC: 31.1 MMOL/L (ref 22–29)
CREAT SERPL-MCNC: 0.73 MG/DL (ref 0.6–1.1)
DEPRECATED RDW RBC AUTO: 50.8 FL (ref 37–54)
EOSINOPHIL # BLD AUTO: 0.24 10*3/MM3 (ref 0–0.4)
EOSINOPHIL NFR BLD AUTO: 3.8 % (ref 0.3–6.2)
ERYTHROCYTE [DISTWIDTH] IN BLOOD BY AUTOMATED COUNT: 14 % (ref 12.3–15.4)
GFR SERPL CREATININE-BSD FRML MDRD: 75 ML/MIN/1.73
GLOBULIN UR ELPH-MCNC: 3.2 GM/DL (ref 1.8–3.5)
GLUCOSE SERPL-MCNC: 115 MG/DL (ref 74–124)
HCT VFR BLD AUTO: 39.3 % (ref 34–46.6)
HGB BLD-MCNC: 12.5 G/DL (ref 12–15.9)
IMM GRANULOCYTES # BLD AUTO: 0.01 10*3/MM3 (ref 0–0.05)
IMM GRANULOCYTES NFR BLD AUTO: 0.2 % (ref 0–0.5)
LDH SERPL-CCNC: 228 U/L (ref 99–259)
LYMPHOCYTES # BLD AUTO: 2.06 10*3/MM3 (ref 0.7–3.1)
LYMPHOCYTES NFR BLD AUTO: 32.5 % (ref 19.6–45.3)
MCH RBC QN AUTO: 31.6 PG (ref 26.6–33)
MCHC RBC AUTO-ENTMCNC: 31.8 G/DL (ref 31.5–35.7)
MCV RBC AUTO: 99.2 FL (ref 79–97)
MONOCYTES # BLD AUTO: 0.51 10*3/MM3 (ref 0.1–0.9)
MONOCYTES NFR BLD AUTO: 8.1 % (ref 5–12)
NEUTROPHILS NFR BLD AUTO: 3.44 10*3/MM3 (ref 1.7–7)
NEUTROPHILS NFR BLD AUTO: 54.3 % (ref 42.7–76)
NRBC BLD AUTO-RTO: 0 /100 WBC (ref 0–0.2)
PLATELET # BLD AUTO: 218 10*3/MM3 (ref 140–450)
PMV BLD AUTO: 10.1 FL (ref 6–12)
POTASSIUM SERPL-SCNC: 3.5 MMOL/L (ref 3.5–4.7)
PROT SERPL-MCNC: 7.3 G/DL (ref 6.3–8)
RBC # BLD AUTO: 3.96 10*6/MM3 (ref 3.77–5.28)
SODIUM SERPL-SCNC: 138 MMOL/L (ref 134–145)
WBC # BLD AUTO: 6.33 10*3/MM3 (ref 3.4–10.8)

## 2020-10-27 PROCEDURE — 36415 COLL VENOUS BLD VENIPUNCTURE: CPT

## 2020-10-27 PROCEDURE — 83615 LACTATE (LD) (LDH) ENZYME: CPT

## 2020-10-27 PROCEDURE — 99213 OFFICE O/P EST LOW 20 MIN: CPT | Performed by: INTERNAL MEDICINE

## 2020-10-27 PROCEDURE — 85025 COMPLETE CBC W/AUTO DIFF WBC: CPT

## 2020-10-27 PROCEDURE — 80053 COMPREHEN METABOLIC PANEL: CPT

## 2020-10-27 NOTE — PROGRESS NOTES
Subjective .     REASONS FOR FOLLOWUP:    [id number] invasive ductal carcinoma in the left breast. The mammogram showed the lesion to be 1.3 x 1.7 cm. She had a digital mammogram that showed a 1.8 cm mass in the left breast at 6 o'clock position. The biopsy was consistent wi t h invasive ductal carcinoma, grade 2, Mobile score of 6 out of 9, ER positive greater than 90%, AZ positive greater than 90%, HER-2/shannon 2+ by immunohistochemistry but negative by FISH. She is status post lumpectomy which showed a 1.3 x 1.7 cm mass, gr a de 2, Mobile score of 6 out of 9 and there was a component of DCIS. A second focus of DCIS was present along the superior margin away from the actual mass and the superior margin was involved. As a result she underwent re-excision and this was negati ve. She had 3 lymph nodes removed and there was macrometastasis in 1 lymph node which was about 0.4 cm. Currently the patient refuses chemotherapy and is going to be started on hormonal treatment with Arimidex.      HISTORY OF PRESENT ILLNESS:  The patient is a 88 y.o. year old female who is here for follow-up with the above-mentioned history.    History of Present Illness     The patient is an elderly female, currently here for followup. She is on Arimidex for her [id number] invasive ductal carcinoma of the left breast.  Given her age patient had refused chemotherapy and currently is just on Arimidex.  She is tolerating it very well.  She started Arimidex since June 2014.  She is 4 .5 years out.  She does not have much of arthralgias.  She is on Fosamax for osteopenia.  Her last bone density was in July 2017.    DEXA scan has not been done yet.  We will need to order    Interval history: Patient doing well on anastrozole and alendronate except mild arthralgias.  Mammogram August 27, 2020 - negative bone density August 2020 with osteoporosis.  No history of fractures        PAST MEDICAL HISTORY:   1. Significant for high cholesterol.    2. Hyperthyroidism.   3. Depression.   4. History of hypertension.  5.    PAST SURGICAL HISTORY: Three C-sections.     OB/GYN HISTORY: She started menstrual periods at age 14. She attained menopause around 50. She is  3, para 3, no miscarriages. She was 23 years old when she had her first child and 29 when she had the last one. Her children are 59, 58 and 54 years old. Two girls and 1 boy.     ONCOLOGIC HISTORY      ONCOLOGIC HISTORY: The patient is an 82-year-old female who likes to be called Tati who has noticed a bump in the left breast. As a result, she went to her primary care physician and mammogram was ordered. On 2014 she underwent mammogram at [facility] and the mammogram showed evidence of a 1.3 x 0.7 x 0.9 cm irregular hypoechoic mass that extends into the skin and in the left breast at the 7 o'clock position 11 cm from the nipple. As a result a biopsy was recommended. The mammogra m on the right was negative. She underwent a bilateral digital mammogram which showed that she had scattered fibroglandular densities throughout both breasts within the posterior one-third of the left breast at the 6 o'clock position . At the inframammary fold there was an irregular mass that measured 1.8 cm in greatest dimension with spiculation and architectural distortion noted. No abnormality was seen on the right breast. Mildly prominent left axillary lymph node was noted. Ultrasound was done which showed that there was a 1.3 x 0.7 x 0.9 cm irregular mass that extended into the skin. As a result, the patient underwent ultrasound-guided biopsy on 2014. The pathology, accession #[id number]. 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 The histologic grade was 2, Epping score was 6 out of 9 and the maximal span of invasive carcinoma in the core was 8 mm. The estrogen receptors were done and was greater than 90%,  progestero n e receptor was 90%, HER-2 by immunohistochemistry was 2+ but by FISH HER/2 ratio was 1.4 which is negative. Subsequently, the patient was referred to Dr. Bagley and MRI of the breast was performed on 05/02/2014 and MRI showed that within the left breas t at 6 o'clock position in the posterior one-third near the inframammary fold there was an irregular enhancing mass with internal metallic clip. There was linear enhancement extending anteriorly away from the mass. The mass itself measured 2 cm in the sup e rior to inferior dimension, 2 cm in anterior to posterior dimension and 1.9 cm in the medial to lateral dimension and extending anteriorly away from the mass and contiguous with the mass is a linear clump enhancement that measured about 2.5 cm in the ant e rior posterior dimension. This brings the greatest dimension of the mass and adjacent near clump enhancement to be a total of 4.4 cm. There were no other areas that were suspicious. The right breast was negative. As a result the patient underwent surger y . She underwent left breast lumpectomy with sentinel lymph node sampling on 05/22/2014. The pathology accession number is [id number]. Pathology is consistent with invasive ductal carcinoma intermediate grade, Floweree score 6, tumor size 1.5 x 1.3 x 1.2 c m. 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 Intermediate nuclear grade minor component margins were focally involved by DCIS of the superior margin. There is a focus of intermediate grade DCIS located approximately 1.3 cm from the invasive carcinoma, which involves the superior margin and distance of invasive carcinoma from the closest margin was 1 mm superiorly and distance of in situ carcinoma from the closest margins was involved superiorly. There were 3 lymph nodes removed. There was micrometast a sis in 1 lymph node and the size of the micrometastasis was 0.4 cm and it is a [id number] invasive ductal carcinoma. There was tumor invasion of  dermis present. The patient then had to undergo a second surgery on 2014 because of positive margin. She u nderwent re-excision of the superior margin of the left breast. The pathology accession number is [id number] and the patient underwent a left breast lumpectomy re-excision. Focal non-atypical ductal hyperplasia, extensive fat necrosis with fibrosis and mix e d inflammation, no atypia. The patient then was referred here for the evaluation of options of treatment. She has already seen Dr. Pat Sage yesterday with plans of starting radiation on Monday. She has healed up from the surgery. She is here to disc uss options of further treatment from us. Currently she has no complaints except for a cough, which is chronic, which is likely secondary to allergies as the cough gets worse during the allergy season.    Patient has been on Arimidex since 2014 with olive ns to continue a total of 5 years.    Radiation between 2014 to 2014.    The 2015 mammogram is benign. We will continue Arimidex.    2016 mammogram negative.  Screening mammogram 2018-    MEDICATIONS: The current medication list was reviewed with the patient and updates in the EMR this date per the medical assistant. Medication dosages and frequencies were confirmed to be accurate.     ALLERGIES: Not allergic to any medications.     SOCIAL HISTORY: She is  and lives with her . She is very much into gardening and reading. She does not smoke. She does not drink alcohol. She has no history of any previous blood transfusions  .   FAMILY HISTORY: Father  of a heart attack at 69. Mother had colon cancer and  of colon cancer at 76. She has 1 brother who is 77 and in good health. There is no other family history of breast cancer.             Current Outpatient Medications on File Prior to Visit   Medication Sig Dispense Refill   • alendronate (FOSAMAX) 35 MG tablet TAKE 1 TABLET EVERY 7 (SEVEN) DAYS. 12  tablet 3   • amitriptyline (ELAVIL) 25 MG tablet TAKE 1 TABLET BY MOUTH AT BEDTIME  90 tablet 3   • anastrozole (ARIMIDEX) 1 MG tablet Take 1 tablet by mouth Daily. 14 tablet 0   • atorvastatin (LIPITOR) 10 MG tablet TAKE 1 TABLET EVERY DAY 90 tablet 1   • Azelastine-Fluticasone 137-50 MCG/ACT suspension 2 sprays into each nostril 2 (Two) Times a Day. 1 bottle 1   • Calcium Carb-Cholecalciferol (CALCIUM 1000 + D) 1000-800 MG-UNIT tablet Take  by mouth.     • chlorhexidine (PERIDEX) 0.12 % solution      • hydroCHLOROthiazide (HYDRODIURIL) 25 MG tablet TAKE 1 TABLET EVERY DAY 90 tablet 1   • levothyroxine (Synthroid) 75 MCG tablet Take 1 tablet by mouth Daily. 90 tablet 1   • metoprolol tartrate (LOPRESSOR) 25 MG tablet TAKE 1 TABLET BY MOUTH TWO TIMES A DAY  60 tablet 3   • Multiple Vitamins-Minerals (MULTIVITAMIN ADULT PO) Take  by mouth.     • neomycin-polymyxin-hydrocortisone (CORTISPORIN) 3.5-58586-0 otic solution Administer 3 drops to the right ear 4 (Four) Times a Day. 10 mL 0   • potassium chloride ER (K-TAB) 20 MEQ tablet controlled-release ER tablet Take 1 tablet by mouth Daily. 90 tablet 1   • raloxifene (EVISTA) 60 MG tablet Take 1 tablet by mouth Daily. 30 tablet 6   • XARELTO 20 MG tablet TAKE 1 TABLET EVERY DAY 90 tablet 0   • zolpidem (AMBIEN) 5 MG tablet        No current facility-administered medications on file prior to visit.          Review of Systems   Constitutional: Negative for appetite change, chills, diaphoresis, fatigue, fever and unexpected weight change.   HENT: Negative for hearing loss, sore throat and trouble swallowing.    Respiratory: Negative for cough, chest tightness, shortness of breath and wheezing.    Cardiovascular: Negative for chest pain, palpitations and leg swelling.   Gastrointestinal: Negative for abdominal distention, abdominal pain, constipation, diarrhea, nausea and vomiting.   Genitourinary: Negative for dysuria, frequency, hematuria and urgency.   Musculoskeletal:  "Negative for joint swelling.        No muscle weakness.   Skin: Negative for rash and wound.   Neurological: Negative for seizures, syncope, speech difficulty, weakness, numbness and headaches.   Hematological: Negative for adenopathy. Bruises/bleeds easily (10/27/2020 unchanged).   Psychiatric/Behavioral: Positive for sleep disturbance (having sleep study in october 9/24/19). Negative for behavioral problems, confusion and suicidal ideas. The patient is nervous/anxious (10/27/2020-Unchanged).    I have reviewed and confirmed the accuracy of the ROS as documented by the MA/LPN/RN Marla Lyons MD      Objective      Vitals:    10/27/20 0938   BP: 141/84   Pulse: 87   Resp: 14   Temp: 97.1 °F (36.2 °C)   SpO2: 97%   Weight: 66.2 kg (145 lb 14.4 oz)   Height: 170.2 cm (67.01\")   PainSc: 0-No pain     Current Status 10/27/2020   ECOG score 0       Physical Exam       GENERAL:  Well-developed, well-nourished in no acute distress.   NECK:  Supple with good range of motion; no thyromegaly or masses, no JVD.  LYMPHATICS:  No cervical, supraclavicular, axillary or inguinal adenopathy.  CHEST:  Lungs clear to auscultation. Good airflow.  BREAST: Right breast: No skin changes, no evidence of breast mass, no nipple discharge, no evidence of any right axillary adenopathy or right supraclavicular adenopathy  Left breast: No evidence of any skin changes, no evidence of any left breast mass and no evidence of left nipple discharge as well as no left axillary adenopathy or left supraclavicular adenopathy.  CARDIAC:  Regular rate and rhythm without murmurs, rubs or gallops. Normal S1,S2.  ABDOMEN:  Soft, nontender with no hepatosplenomegaly or masses.  EXTREMITIES:  No clubbing, cyanosis or edema.  NEUROLOGICAL:  Cranial Nerves II-XII grossly intact.  No focal neurological deficits.  PSYCHIATRIC:  Normal affect and mood.    I have reexamined the patient and the results are consistent with the previously documented exam. Marla" MD Serena       RECENT LABS:  Hematology WBC   Date Value Ref Range Status   10/27/2020 6.33 3.40 - 10.80 10*3/mm3 Final   02/11/2020 5.96 3.40 - 10.80 10*3/mm3 Final     RBC   Date Value Ref Range Status   10/27/2020 3.96 3.77 - 5.28 10*6/mm3 Final   02/11/2020 3.86 3.77 - 5.28 10*6/mm3 Final     Hemoglobin   Date Value Ref Range Status   10/27/2020 12.5 12.0 - 15.9 g/dL Final     Hematocrit   Date Value Ref Range Status   10/27/2020 39.3 34.0 - 46.6 % Final     Platelets   Date Value Ref Range Status   10/27/2020 218 140 - 450 10*3/mm3 Final        Assessment/Plan      1. This is a patient with history of T1N1MX invasive ductal carcinoma of the left breast status post surgery, status post radiation, currently she is tolerating Arimidex.      2. Mild arthralgias related to Arimidex.  Patient is in a 6 th year.    3. Osteopenia for which she is taking calcium, vitamin D and fosomax.      4. Our plan is to continue Arimidex 1 mg daily. She is 6  years into treatment.  Continue Arimidex      Plan  1. Continue Arimidex  2. Continue Fosamax, calcium and vitamin D  3. Reviewed mammogram as well as DEXA scan  4. Follow-up in 6 months with labs    Marla Lyons MD             No ref. provider found

## 2020-10-30 NOTE — TELEPHONE ENCOUNTER
Called pt x2. Pt answered and hung up.  
Called pt x3. Letter sent.  
[facility] pharmacy calling requesting refill o Metoprolol to be sent in ASAP, pt is out completely.    Thanks   
She needs her annual before the end of the year for more refills.   
Thanks for trying.  
yes

## 2020-12-20 DIAGNOSIS — E03.9 HYPOTHYROIDISM, UNSPECIFIED TYPE: ICD-10-CM

## 2020-12-20 DIAGNOSIS — R03.0 ELEVATED BLOOD PRESSURE READING WITHOUT DIAGNOSIS OF HYPERTENSION: ICD-10-CM

## 2020-12-21 RX ORDER — LEVOTHYROXINE SODIUM 0.07 MG/1
TABLET ORAL
Qty: 90 TABLET | Refills: 1 | Status: SHIPPED | OUTPATIENT
Start: 2020-12-21 | End: 2021-04-07 | Stop reason: SDUPTHER

## 2020-12-21 RX ORDER — HYDROCHLOROTHIAZIDE 25 MG/1
TABLET ORAL
Qty: 90 TABLET | Refills: 1 | Status: SHIPPED | OUTPATIENT
Start: 2020-12-21 | End: 2021-07-19

## 2020-12-22 NOTE — TELEPHONE ENCOUNTER
Patient is calling requesting a refill on her Xarelto 20mg, stating she thought she had called it in. It needs to go to [facility] mail order.

## 2020-12-24 DIAGNOSIS — G62.9 PERIPHERAL POLYNEUROPATHY: ICD-10-CM

## 2020-12-28 DIAGNOSIS — E78.5 HYPERLIPIDEMIA, UNSPECIFIED HYPERLIPIDEMIA TYPE: ICD-10-CM

## 2020-12-28 RX ORDER — AMITRIPTYLINE HYDROCHLORIDE 25 MG/1
TABLET, FILM COATED ORAL
Qty: 90 TABLET | Refills: 0 | Status: SHIPPED | OUTPATIENT
Start: 2020-12-28 | End: 2021-03-30

## 2020-12-28 RX ORDER — ATORVASTATIN CALCIUM 10 MG/1
TABLET, FILM COATED ORAL
Qty: 90 TABLET | Refills: 1 | Status: SHIPPED | OUTPATIENT
Start: 2020-12-28 | End: 2021-02-05

## 2020-12-29 ENCOUNTER — LAB (OUTPATIENT)
Dept: LAB | Facility: HOSPITAL | Age: 85
End: 2020-12-29

## 2020-12-29 ENCOUNTER — OFFICE VISIT (OUTPATIENT)
Dept: ONCOLOGY | Facility: CLINIC | Age: 85
End: 2020-12-29

## 2020-12-29 VITALS
OXYGEN SATURATION: 93 % | TEMPERATURE: 97.5 F | BODY MASS INDEX: 23.7 KG/M2 | SYSTOLIC BLOOD PRESSURE: 133 MMHG | RESPIRATION RATE: 19 BRPM | WEIGHT: 151 LBS | HEIGHT: 67 IN | DIASTOLIC BLOOD PRESSURE: 68 MMHG | HEART RATE: 98 BPM

## 2020-12-29 DIAGNOSIS — C50.919 MALIGNANT NEOPLASM OF FEMALE BREAST, UNSPECIFIED ESTROGEN RECEPTOR STATUS, UNSPECIFIED LATERALITY, UNSPECIFIED SITE OF BREAST (HCC): Primary | ICD-10-CM

## 2020-12-29 LAB
BASOPHILS # BLD AUTO: 0.13 10*3/MM3 (ref 0–0.2)
BASOPHILS NFR BLD AUTO: 1.8 % (ref 0–1.5)
DEPRECATED RDW RBC AUTO: 48.1 FL (ref 37–54)
EOSINOPHIL # BLD AUTO: 0.28 10*3/MM3 (ref 0–0.4)
EOSINOPHIL NFR BLD AUTO: 3.9 % (ref 0.3–6.2)
ERYTHROCYTE [DISTWIDTH] IN BLOOD BY AUTOMATED COUNT: 14.3 % (ref 12.3–15.4)
HCT VFR BLD AUTO: 38.7 % (ref 34–46.6)
HGB BLD-MCNC: 13 G/DL (ref 12–15.9)
IMM GRANULOCYTES # BLD AUTO: 0.02 10*3/MM3 (ref 0–0.05)
IMM GRANULOCYTES NFR BLD AUTO: 0.3 % (ref 0–0.5)
LYMPHOCYTES # BLD AUTO: 1.91 10*3/MM3 (ref 0.7–3.1)
LYMPHOCYTES NFR BLD AUTO: 26.6 % (ref 19.6–45.3)
MCH RBC QN AUTO: 31 PG (ref 26.6–33)
MCHC RBC AUTO-ENTMCNC: 33.6 G/DL (ref 31.5–35.7)
MCV RBC AUTO: 92.1 FL (ref 79–97)
MONOCYTES # BLD AUTO: 0.87 10*3/MM3 (ref 0.1–0.9)
MONOCYTES NFR BLD AUTO: 12.1 % (ref 5–12)
NEUTROPHILS NFR BLD AUTO: 3.98 10*3/MM3 (ref 1.7–7)
NEUTROPHILS NFR BLD AUTO: 55.3 % (ref 42.7–76)
NRBC BLD AUTO-RTO: 0 /100 WBC (ref 0–0.2)
PLATELET # BLD AUTO: 226 10*3/MM3 (ref 140–450)
PMV BLD AUTO: 10.9 FL (ref 6–12)
RBC # BLD AUTO: 4.2 10*6/MM3 (ref 3.77–5.28)
WBC # BLD AUTO: 7.19 10*3/MM3 (ref 3.4–10.8)

## 2020-12-29 PROCEDURE — 85025 COMPLETE CBC W/AUTO DIFF WBC: CPT

## 2020-12-29 PROCEDURE — 99213 OFFICE O/P EST LOW 20 MIN: CPT | Performed by: NURSE PRACTITIONER

## 2020-12-29 PROCEDURE — 36415 COLL VENOUS BLD VENIPUNCTURE: CPT

## 2020-12-29 NOTE — PROGRESS NOTES
Subjective .     REASONS FOR FOLLOWUP:    [id number] invasive ductal carcinoma in the left breast. The mammogram showed the lesion to be 1.3 x 1.7 cm. She had a digital mammogram that showed a 1.8 cm mass in the left breast at 6 o'clock position. The biopsy was consistent wi t h invasive ductal carcinoma, grade 2, [place] score of 6 out of 9, ER positive greater than 90%, VA positive greater than 90%, HER-2/shannon 2+ by immunohistochemistry but negative by FISH. She is status post lumpectomy which showed a 1.3 x 1.7 cm mass, gr a de 2, Kallie score of 6 out of 9 and there was a component of DCIS. A second focus of DCIS was present along the superior margin away from the actual mass and the superior margin was involved. As a result she underwent re-excision and this was negati ve. She had 3 lymph nodes removed and there was macrometastasis in 1 lymph node which was about 0.4 cm. Currently the patient refuses chemotherapy and is going to be started on hormonal treatment with Arimidex.      HISTORY OF PRESENT ILLNESS:  The patient is a 89 y.o. year old female who is here for follow-up with the above-mentioned history.    History of Present Illness     Patient is an 89-year-old female with the above-mentioned history returns today for follow-up.  She states that her last visit Dr. Lyons instructed her to hold one of her medications but she was having some weakness in her legs.  The patient states that she still has not noticed any improvement in her lower extremity weakness since holding the medication.  She states that she notices it more with activity.  She states that her legs get tired if she walks to the mailbox, and she has to rest on the way back.  Conversely, she is able to mow the grass with her electric mower without becoming fatigued.    She denies any new palpable breast masses or nodularity.      PAST MEDICAL HISTORY:   1. Significant for high cholesterol.   2. Hyperthyroidism.   3. Depression.    4. History of hypertension.  5.    PAST SURGICAL HISTORY: Three C-sections.     OB/GYN HISTORY: She started menstrual periods at age 14. She attained menopause around 50. She is  3, para 3, no miscarriages. She was 23 years old when she had her first child and 29 when she had the last one. Her children are 59, 58 and 54 years old. Two girls and 1 boy.     ONCOLOGIC HISTORY      ONCOLOGIC HISTORY: The patient is an 82-year-old female who likes to be called Tati who has noticed a bump in the left breast. As a result, she went to her primary care physician and mammogram was ordered. On 2014 she underwent mammogram at [facility] and the mammogram showed evidence of a 1.3 x 0.7 x 0.9 cm irregular hypoechoic mass that extends into the skin and in the left breast at the 7 o'clock position 11 cm from the nipple. As a result a biopsy was recommended. The mammogra m on the right was negative. She underwent a bilateral digital mammogram which showed that she had scattered fibroglandular densities throughout both breasts within the posterior one-third of the left breast at the 6 o'clock position . At the inframammary fold there was an irregular mass that measured 1.8 cm in greatest dimension with spiculation and architectural distortion noted. No abnormality was seen on the right breast. Mildly prominent left axillary lymph node was noted. Ultrasound was done which showed that there was a 1.3 x 0.7 x 0.9 cm irregular mass that extended into the skin. As a result, the patient underwent ultrasound-guided biopsy on 2014. The pathology, accession #[id number]. 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 The histologic grade was 2, Kallie score was 6 out of 9 and the maximal span of invasive carcinoma in the core was 8 mm. The estrogen receptors were done and was greater than 90%, progestero n e receptor was 90%, HER-2  by immunohistochemistry was 2+ but by FISH HER/2 ratio was 1.4 which is negative. Subsequently, the patient was referred to Dr. Bagley and MRI of the breast was performed on 05/02/2014 and MRI showed that within the left breas t at 6 o'clock position in the posterior one-third near the inframammary fold there was an irregular enhancing mass with internal metallic clip. There was linear enhancement extending anteriorly away from the mass. The mass itself measured 2 cm in the sup e rior to inferior dimension, 2 cm in anterior to posterior dimension and 1.9 cm in the medial to lateral dimension and extending anteriorly away from the mass and contiguous with the mass is a linear clump enhancement that measured about 2.5 cm in the ant e rior posterior dimension. This brings the greatest dimension of the mass and adjacent near clump enhancement to be a total of 4.4 cm. There were no other areas that were suspicious. The right breast was negative. As a result the patient underwent surger y . She underwent left breast lumpectomy with sentinel lymph node sampling on 05/22/2014. The pathology accession number is [id number]. Pathology is consistent with invasive ductal carcinoma intermediate grade, Kallie score 6, tumor size 1.5 x 1.3 x 1.2 c m. 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 Intermediate nuclear grade minor component margins were focally involved by DCIS of the superior margin. There is a focus of intermediate grade DCIS located approximately 1.3 cm from the invasive carcinoma, which involves the superior margin and distance of invasive carcinoma from the closest margin was 1 mm superiorly and distance of in situ carcinoma from the closest margins was involved superiorly. There were 3 lymph nodes removed. There was micrometast a sis in 1 lymph node and the size of the micrometastasis was 0.4 cm and it is a [id number] invasive ductal carcinoma. There was tumor invasion of dermis present. The patient then had to  undergo a second surgery on 2014 because of positive margin. She u nderwent re-excision of the superior margin of the left breast. The pathology accession number is [id number] and the patient underwent a left breast lumpectomy re-excision. Focal non-atypical ductal hyperplasia, extensive fat necrosis with fibrosis and mix e d inflammation, no atypia. The patient then was referred here for the evaluation of options of treatment. She has already seen Dr. Pat Sage yesterday with plans of starting radiation on Monday. She has healed up from the surgery. She is here to disc uss options of further treatment from us. Currently she has no complaints except for a cough, which is chronic, which is likely secondary to allergies as the cough gets worse during the allergy season.    Patient has been on Arimidex since 2014 with olive ns to continue a total of 5 years.    Radiation between 2014 to 2014.    The 2015 mammogram is benign. We will continue Arimidex.    2016 mammogram negative.  Screening mammogram 2018-  [place] mammogram 2020 negative    MEDICATIONS: The current medication list was reviewed with the patient and updates in the EMR this date per the medical assistant. Medication dosages and frequencies were confirmed to be accurate.     ALLERGIES: Not allergic to any medications.     SOCIAL HISTORY: She is  and lives with her . She is very much into gardening and reading. She does not smoke. She does not drink alcohol. She has no history of any previous blood transfusions  .   FAMILY HISTORY: Father  of a heart attack at 69. Mother had colon cancer and  of colon cancer at 76. She has 1 brother who is 77 and in good health. There is no other family history of breast cancer.             Current Outpatient Medications on File Prior to Visit   Medication Sig Dispense Refill   • alendronate (FOSAMAX) 35 MG tablet TAKE 1 TABLET EVERY 7 (SEVEN) DAYS. 12  tablet 3   • amitriptyline (ELAVIL) 25 MG tablet TAKE 1 TABLET BY MOUTH AT BEDTIME  90 tablet 0   • atorvastatin (LIPITOR) 10 MG tablet TAKE 1 TABLET EVERY DAY 90 tablet 1   • Azelastine-Fluticasone 137-50 MCG/ACT suspension 2 sprays into each nostril 2 (Two) Times a Day. 1 bottle 1   • Calcium Carb-Cholecalciferol (CALCIUM 1000 + D) 1000-800 MG-UNIT tablet Take  by mouth.     • chlorhexidine (PERIDEX) 0.12 % solution      • hydroCHLOROthiazide (HYDRODIURIL) 25 MG tablet TAKE 1 TABLET EVERY DAY 90 tablet 1   • levothyroxine (SYNTHROID, LEVOTHROID) 75 MCG tablet TAKE 1 TABLET EVERY DAY 90 tablet 1   • metoprolol tartrate (LOPRESSOR) 25 MG tablet TAKE 1 TABLET BY MOUTH TWO TIMES A DAY  60 tablet 3   • Multiple Vitamins-Minerals (MULTIVITAMIN ADULT PO) Take  by mouth.     • neomycin-polymyxin-hydrocortisone (CORTISPORIN) 3.5-91164-6 otic solution Administer 3 drops to the right ear 4 (Four) Times a Day. 10 mL 0   • potassium chloride ER (K-TAB) 20 MEQ tablet controlled-release ER tablet Take 1 tablet by mouth Daily. 90 tablet 1   • rivaroxaban (Xarelto) 20 MG tablet Take 1 tablet by mouth Daily. 90 tablet 1   • zolpidem (AMBIEN) 5 MG tablet      • [DISCONTINUED] anastrozole (ARIMIDEX) 1 MG tablet Take 1 tablet by mouth Daily. 14 tablet 0   • [DISCONTINUED] raloxifene (EVISTA) 60 MG tablet Take 1 tablet by mouth Daily. 30 tablet 6     No current facility-administered medications on file prior to visit.          Review of Systems   Constitutional: Positive for fatigue. Negative for activity change, appetite change, chills and fever.   HENT: Positive for hearing loss. Negative for mouth sores, nosebleeds and trouble swallowing.    Respiratory: Negative for cough and shortness of breath.    Cardiovascular: Negative for chest pain and leg swelling.   Gastrointestinal: Negative for abdominal pain, constipation, diarrhea, nausea and vomiting.   Genitourinary: Negative for difficulty urinating.   Musculoskeletal: Positive for  "arthralgias and myalgias.   Skin: Negative for rash.   Neurological: Negative for dizziness, weakness and numbness.   Hematological: Negative for adenopathy. Does not bruise/bleed easily.   Psychiatric/Behavioral: Negative for sleep disturbance.       Objective      Vitals:    12/29/20 1353   BP: 133/68   Pulse: 98   Resp: 19   Temp: 97.5 °F (36.4 °C)   TempSrc: Temporal   SpO2: 93%   Weight: 68.5 kg (151 lb)   Height: 170.2 cm (67.01\")   PainSc:   3   PainLoc: Knee     Current Status 12/29/2020   ECOG score 0       Physical Exam   Constitutional: She is oriented to person, place, and time. She appears well-developed. No distress.   HENT:   Head: Normocephalic and atraumatic.   Mouth/Throat: No oropharyngeal exudate.   Eyes: Pupils are equal, round, and reactive to light.   Neck: Normal range of motion.   Cardiovascular: Normal rate and normal heart sounds. An irregularly irregular rhythm present.   No murmur heard.  Pulmonary/Chest: Effort normal and breath sounds normal. No respiratory distress. She has no wheezes. She has no rhonchi. She has no rales.   Abdominal: Soft. Normal appearance and bowel sounds are normal. She exhibits no distension.   Musculoskeletal: Normal range of motion.   Neurological: She is alert and oriented to person, place, and time.   Skin: Skin is warm and dry. No rash noted.   Vitals reviewed.         RECENT LABS:  Hematology WBC   Date Value Ref Range Status   12/29/2020 7.19 3.40 - 10.80 10*3/mm3 Final   02/11/2020 5.96 3.40 - 10.80 10*3/mm3 Final     RBC   Date Value Ref Range Status   12/29/2020 4.20 3.77 - 5.28 10*6/mm3 Final   02/11/2020 3.86 3.77 - 5.28 10*6/mm3 Final     Hemoglobin   Date Value Ref Range Status   12/29/2020 13.0 12.0 - 15.9 g/dL Final     Hematocrit   Date Value Ref Range Status   12/29/2020 38.7 34.0 - 46.6 % Final     Platelets   Date Value Ref Range Status   12/29/2020 226 140 - 450 10*3/mm3 Final        Assessment/Plan      1. This is a patient with history of " [id number] invasive ductal carcinoma of the left breast status post surgery 2014, status post radiation,   · Started Arimidex July 2014.    Patient complains of issues with weakness in her lower extremities.  After reviewing discussion with Dr. Lyons it was recommended that the patient go ahead and discontinue Arimidex as she has completed over 6 years.  We recommend she discontinue due to side effects as well as osteoporosis.  This was given to patient in written form as well.      2.  Osteoporosis: Seen on DEXA scan 8/3/2020.  She is taking calcium, vitamin D and fosomax.    3.  HX A-fib: On Xarelto    Plan  1. Stop Arimidex due side effects.  2. Continue Fosamax, calcium, and vitamin D.  3. Return for follow-up visit with Dr. Lyons 3/2/2021.  4. Patient's next mammogram will be due after August 27, 2021.      NATASHA Vega             No ref. provider found

## 2021-02-04 ENCOUNTER — OFFICE VISIT (OUTPATIENT)
Dept: INTERNAL MEDICINE | Age: 86
End: 2021-02-04

## 2021-02-04 VITALS
BODY MASS INDEX: 22.79 KG/M2 | OXYGEN SATURATION: 96 % | SYSTOLIC BLOOD PRESSURE: 128 MMHG | HEIGHT: 67 IN | HEART RATE: 82 BPM | WEIGHT: 145.2 LBS | TEMPERATURE: 97.3 F | DIASTOLIC BLOOD PRESSURE: 82 MMHG

## 2021-02-04 DIAGNOSIS — E55.9 VITAMIN D DEFICIENCY: ICD-10-CM

## 2021-02-04 DIAGNOSIS — M81.0 OSTEOPOROSIS, UNSPECIFIED OSTEOPOROSIS TYPE, UNSPECIFIED PATHOLOGICAL FRACTURE PRESENCE: ICD-10-CM

## 2021-02-04 DIAGNOSIS — I10 ESSENTIAL HYPERTENSION: Primary | ICD-10-CM

## 2021-02-04 DIAGNOSIS — E03.9 HYPOTHYROIDISM, UNSPECIFIED TYPE: ICD-10-CM

## 2021-02-04 DIAGNOSIS — E78.5 HYPERLIPIDEMIA, UNSPECIFIED HYPERLIPIDEMIA TYPE: ICD-10-CM

## 2021-02-04 DIAGNOSIS — Z85.3 HISTORY OF MALIGNANT NEOPLASM OF FEMALE BREAST: ICD-10-CM

## 2021-02-04 PROCEDURE — 99214 OFFICE O/P EST MOD 30 MIN: CPT | Performed by: NURSE PRACTITIONER

## 2021-02-04 NOTE — PROGRESS NOTES
[facility]  Helen Quinn / 89 y.o. / female  02/04/2021      ASSESSMENT & PLAN:    Problem List Items Addressed This Visit        Cardiac and Vasculature    HLD (hyperlipidemia)    Relevant Medications    atorvastatin (LIPITOR) 10 MG tablet    Other Relevant Orders    Lipid Panel With / Chol / HDL Ratio    Hypertension - Primary    Relevant Medications    metoprolol tartrate (LOPRESSOR) 25 MG tablet    hydroCHLOROthiazide (HYDRODIURIL) 25 MG tablet    Other Relevant Orders    Comprehensive Metabolic Panel       Endocrine and Metabolic    Hypothyroidism    Relevant Medications    metoprolol tartrate (LOPRESSOR) 25 MG tablet    levothyroxine (SYNTHROID, LEVOTHROID) 75 MCG tablet    Other Relevant Orders    TSH+Free T4       Hematology and Neoplasia    Breast CA (CMS/HCC)    Overview     Description: Noted on biopsy April 2014            Musculoskeletal and Injuries    Osteoporosis    Overview     Overview:   decrease in Vit D         Relevant Orders    Vitamin D 25 Hydroxy      Other Visit Diagnoses     Vitamin D deficiency        Relevant Orders    Vitamin D 25 Hydroxy        Orders Placed This Encounter   Procedures   • Vitamin D 25 Hydroxy   • TSH+Free T4   • Comprehensive Metabolic Panel   • Lipid Panel With / Chol / HDL Ratio     No orders of the defined types were placed in this encounter.      Summary/Discussion:    1. Essential hypertension  Blood pressure stable on current therapy, continue same.  Check labs today and adjust dosage of medication as necessary.  Follow-up 4 months.    - Comprehensive Metabolic Panel    2. Hypothyroidism, unspecified type    - TSH+Free T4    3. Hyperlipidemia, unspecified hyperlipidemia type  Check fasting lipid panel today.  Adjustment of medication as necessary.  Continue to follow and improve on low-fat, low carbohydrate diet.     Patient is requesting review of her medications to see if anything can be discontinued. Discussed with  patient if cholesterol remains controlled, may consider discontinuation of statin therapy considering that she is on such a low dosage.    - Lipid Panel With / Chol / HDL Ratio    4. Malignant neoplasm of female breast, unspecified estrogen receptor status, unspecified laterality, unspecified site of breast (CMS/HCC)      5. Osteoporosis, unspecified osteoporosis type, unspecified pathological fracture presence    - Vitamin D 25 Hydroxy    6. Vitamin D deficiency    - Vitamin D 25 Hydroxy        Return in about 4 months (around 6/4/2021) for Next scheduled follow up.  ____________________________________________________________________    MEDICATIONS  Current Outpatient Medications   Medication Sig Dispense Refill   • alendronate (FOSAMAX) 35 MG tablet TAKE 1 TABLET EVERY 7 (SEVEN) DAYS. 12 tablet 3   • amitriptyline (ELAVIL) 25 MG tablet TAKE 1 TABLET BY MOUTH AT BEDTIME  90 tablet 0   • atorvastatin (LIPITOR) 10 MG tablet TAKE 1 TABLET EVERY DAY 90 tablet 1   • Azelastine-Fluticasone 137-50 MCG/ACT suspension 2 sprays into each nostril 2 (Two) Times a Day. 1 bottle 1   • Calcium Carb-Cholecalciferol (CALCIUM 1000 + D) 1000-800 MG-UNIT tablet Take  by mouth.     • chlorhexidine (PERIDEX) 0.12 % solution      • hydroCHLOROthiazide (HYDRODIURIL) 25 MG tablet TAKE 1 TABLET EVERY DAY 90 tablet 1   • levothyroxine (SYNTHROID, LEVOTHROID) 75 MCG tablet TAKE 1 TABLET EVERY DAY 90 tablet 1   • metoprolol tartrate (LOPRESSOR) 25 MG tablet TAKE 1 TABLET BY MOUTH TWO TIMES A DAY  60 tablet 3   • Multiple Vitamins-Minerals (MULTIVITAMIN ADULT PO) Take  by mouth.     • potassium chloride ER (K-TAB) 20 MEQ tablet controlled-release ER tablet Take 1 tablet by mouth Daily. 90 tablet 1   • rivaroxaban (Xarelto) 20 MG tablet Take 1 tablet by mouth Daily. 90 tablet 1   • neomycin-polymyxin-hydrocortisone (CORTISPORIN) 3.5-36528-2 otic solution Administer 3 drops to the right ear 4 (Four) Times a Day. 10 mL 0   • zolpidem (AMBIEN) 5  "MG tablet        No current facility-administered medications for this visit.        VITALS    Visit Vitals  /82   Pulse 82   Temp 97.3 °F (36.3 °C) (Temporal)   Ht 170.2 cm (67.01\")   Wt 65.9 kg (145 lb 3.2 oz)   SpO2 96%   BMI 22.74 kg/m²       BP Readings from Last 3 Encounters:   02/04/21 128/82   12/29/20 133/68   10/27/20 141/84     Wt Readings from Last 3 Encounters:   02/04/21 65.9 kg (145 lb 3.2 oz)   12/29/20 68.5 kg (151 lb)   10/27/20 66.2 kg (145 lb 14.4 oz)      Body mass index is 22.74 kg/m².    CC:  Main reason(s) for today's visit: Follow-up for hypertension and hyperlipidemia, hypothyroidism       HPI:   Chronic essential hypertension:  Since prior visit: compliant with medication(s), does not check blood pressure at home and denies significant problems with medication(s). Currently taking hydrochlorothiazide (HCTZ) and metoprolol (Lopressor, Toprol). She is exercising, is adherent to low sodium diet.She denies symptoms of chest pain/pressure, dyspnea, swelling, vision impairment. CVD risk factors include: advanced age (>55 for males, >65 for females), dyslipidemia, HTN.  Most recent in-office blood pressure readings:     BP Readings from Last 3 Encounters:   02/04/21 128/82   12/29/20 133/68   10/27/20 141/84       Chronic hyperlipidemia:  Current therapy include atorvastatin.    Most recent labs:   Lab Results   Component Value Date    LDL 75 02/11/2020     (H) 06/25/2018    LDL 82 01/04/2017    HDL 65 (H) 02/11/2020    HDL 63 (H) 06/25/2018    HDL 67 (H) 01/04/2017    TRIG 94 02/11/2020    CHOLHDLRATIO 2.45 02/11/2020        Hypothyroidism  Angela Quinn is a 89 y.o. female who presents for follow up of hypothyroidism. Current symptoms: none . Patient denies change in energy level, diarrhea, heat / cold intolerance, nervousness, palpitations and weight changes. Symptoms have been basically asymptomatic.    Patient Care Team:  Hurst, Helen N, APRN as PCP - General (Internal " Medicine)  Marla Lyons MD as Consulting Physician (Hematology and Oncology)  Gregor Bagley MD as Referring Physician (Breast Surgery)  Kyra Sage MD as Consulting Physician (Radiation Oncology)  Dorene Malloy MD as Consulting Physician (Ophthalmology)  Luke Linton MD (Inactive) as Consulting Physician (Gastroenterology)  Blake Lara Jr., MD as Consulting Physician (Cardiology)  ____________________________________________________________________      REVIEW OF SYSTEMS    Review of Systems   Constitutional: Negative for activity change, appetite change and unexpected weight change.   HENT: Negative for tinnitus.    Eyes: Negative for visual disturbance.   Respiratory: Negative for cough, chest tightness and shortness of breath.    Cardiovascular: Negative for chest pain, palpitations and leg swelling.   Neurological: Negative for dizziness, light-headedness and headaches.         PHYSICAL EXAMINATION    Physical Exam  Cardiovascular:      Rate and Rhythm: Rhythm irregular.         REVIEWED DATA:    Labs:   Lab Results   Component Value Date     10/27/2020    K 3.5 10/27/2020    AST 25 10/27/2020    ALT 13 10/27/2020    BUN 15 10/27/2020    CREATININE 0.73 10/27/2020    CREATININE 0.75 08/11/2020    CREATININE 0.76 03/16/2020    EGFRIFNONA 75 10/27/2020    EGFRIFAFRI 88 08/11/2020       Lab Results   Component Value Date    GLUCOSE 115 10/27/2020    GLUCOSE 84 08/07/2019    GLUCOSE 91 04/09/2019       Lab Results   Component Value Date    LDL 75 02/11/2020     (H) 06/25/2018    LDL 82 01/04/2017    HDL 65 (H) 02/11/2020    HDL 63 (H) 06/25/2018    HDL 67 (H) 01/04/2017    TRIG 94 02/11/2020    TRIG 85 06/25/2018    TRIG 148 01/04/2017    CHOLHDLRATIO 2.45 02/11/2020       Lab Results   Component Value Date    TSH 0.735 08/11/2020    FREET4 2.09 (H) 08/11/2020          Lab Results   Component Value Date    WBC 7.19 12/29/2020    HGB 13.0 12/29/2020    HGB 12.5 10/27/2020     HGB 12.3 2020     2020       Lab Results   Component Value Date    LEUKOCYTESUR Moderate (2+) (A) 2019       Imaging:        Medical Tests:        Summary of old records / correspondence / consultant report:        Request outside records:        ALLERGIES  No Known Allergies     PFSH:     The following portions of the patient's history were reviewed and updated as appropriate: Allergies / Current Medications / Past Medical History / Surgical History / Social History / Family History    PROBLEM LIST   Patient Active Problem List   Diagnosis   • Breast CA (CMS/HCC)   • Hypothyroidism   • HLD (hyperlipidemia)   • Routine health maintenance   • Imbalance   • Altered bowel habits   • Colon polyp, hyperplastic   • Colon polyps   • Depression   • Diverticulitis of intestine   • Osteoarthritis   • Hearing loss   • Hypertension   • NISHI (obstructive sleep apnea)   • Osteoporosis   • Urinary incontinence   • Medicare annual wellness visit, initial   • Encounter for immunization   • Peripheral polyneuropathy   • Vaginal discharge   • Osteopenia of both lower legs   • Fibroids, submucosal   • Persistent atrial fibrillation (CMS/HCC)       PAST MEDICAL HISTORY  Past Medical History:   Diagnosis Date   • Ankle fracture     Right   • Breast cancer (CMS/HCC)    • Cancer (CMS/HCC)     Left breast cancer, [id number] invasive ductal carcinoma   • Cataract    • Colon polyp, hyperplastic 2016   • Depression    • Disease of thyroid gland    • DJD (degenerative joint disease)    • Glaucoma    • History of diverticulitis    • [place] (hard of hearing)    • Hyperlipidemia    • Hypertension    • Left anterior hemiblock    • Osteopenia    • Osteoporosis    • Radiation    • Skin cancer of nose    • Sleep apnea        SURGICAL HISTORY  Past Surgical History:   Procedure Laterality Date   • BREAST BIOPSY Left    • BREAST LUMPECTOMY Left    • CATARACT EXTRACTION     •  SECTION      x3   • COLONOSCOPY     •  COLONOSCOPY N/A 5/24/2016    Procedure:  colonoscopy into cecum with saline injection, hot snare polypectomy, APC used in ascending polyp area;  Surgeon: Luke Linton MD;  [facility];  Service:    • COLONOSCOPY N/A 12/12/2016    Procedure: COLONOSCOPY into cecum with polypectomies and methylene blue inj and saline inj. (5 cc.);  Surgeon: Luke Linton MD;  Location: [facility] ENDOSCOPY;  Service:    • COLONOSCOPY W/ BIOPSIES  2009   • MYOMECTOMY  08/11/2017    Partial   • POLYPECTOMY  1982   • SKIN CANCER EXCISION  2011    Nose       SOCIAL HISTORY  Social History     Socioeconomic History   • Marital status:      Spouse name: Gregor   • Number of children: 3   • Years of education: Not on file   • Highest education level: Not on file   Occupational History   • Occupation: Office     Employer: RETIRED   Tobacco Use   • Smoking status: Never Smoker   • Smokeless tobacco: Never Used   Substance and Sexual Activity   • Alcohol use: No   • Drug use: No   • Sexual activity: Defer   Social History Narrative           FAMILY HISTORY  Family History   Problem Relation Age of Onset   • Colon cancer Mother    • Cancer Mother    • Coronary artery disease Father    • Heart attack Father    • Heart disease Father    • Sleep apnea Brother

## 2021-02-05 ENCOUNTER — TELEPHONE (OUTPATIENT)
Dept: INTERNAL MEDICINE | Age: 86
End: 2021-02-05

## 2021-02-05 DIAGNOSIS — E87.6 HYPOKALEMIA: ICD-10-CM

## 2021-02-05 LAB
25(OH)D3+25(OH)D2 SERPL-MCNC: 43.9 NG/ML (ref 30–100)
ALBUMIN SERPL-MCNC: 4.3 G/DL (ref 3.5–5.2)
ALBUMIN/GLOB SERPL: 1.3 G/DL
ALP SERPL-CCNC: 91 U/L (ref 39–117)
ALT SERPL-CCNC: 13 U/L (ref 1–33)
AST SERPL-CCNC: 25 U/L (ref 1–32)
BILIRUB SERPL-MCNC: 0.9 MG/DL (ref 0–1.2)
BUN SERPL-MCNC: 11 MG/DL (ref 8–23)
BUN/CREAT SERPL: 15.7 (ref 7–25)
CALCIUM SERPL-MCNC: 9.5 MG/DL (ref 8.6–10.5)
CHLORIDE SERPL-SCNC: 95 MMOL/L (ref 98–107)
CHOLEST SERPL-MCNC: 158 MG/DL (ref 0–200)
CHOLEST/HDLC SERPL: 2.32 {RATIO}
CO2 SERPL-SCNC: 34 MMOL/L (ref 22–29)
CREAT SERPL-MCNC: 0.7 MG/DL (ref 0.57–1)
GLOBULIN SER CALC-MCNC: 3.2 GM/DL
GLUCOSE SERPL-MCNC: 105 MG/DL (ref 65–99)
HDLC SERPL-MCNC: 68 MG/DL (ref 40–60)
LDLC SERPL CALC-MCNC: 74 MG/DL (ref 0–100)
POTASSIUM SERPL-SCNC: 3.2 MMOL/L (ref 3.5–5.2)
PROT SERPL-MCNC: 7.5 G/DL (ref 6–8.5)
SODIUM SERPL-SCNC: 138 MMOL/L (ref 136–145)
T4 FREE SERPL-MCNC: 1.92 NG/DL (ref 0.93–1.7)
TRIGL SERPL-MCNC: 85 MG/DL (ref 0–150)
TSH SERPL DL<=0.005 MIU/L-ACNC: 1.04 UIU/ML (ref 0.27–4.2)
VLDLC SERPL CALC-MCNC: 16 MG/DL (ref 5–40)

## 2021-02-05 RX ORDER — POTASSIUM CHLORIDE 1500 MG/1
20 TABLET, FILM COATED, EXTENDED RELEASE ORAL 2 TIMES DAILY
Qty: 180 TABLET | Refills: 1 | Status: SHIPPED | OUTPATIENT
Start: 2021-02-05

## 2021-02-05 NOTE — TELEPHONE ENCOUNTER
----- Message from NATASHA Turner sent at 2/5/2021  7:02 AM EST -----  Please call patient with results and send result letter to home address.    Tati:     Here are the results of your labs from your visit.    Thyroid level normal (higher end of normal). At this time, continue current dosage replacement and we will recheck next office visit.     Potassium low. Recommend increase potassium replacement to 20 meq twice daily.     Cholesterol levels look great. I would recommend we go ahead and stop the ATORVASTIN, since it is such as low dose. We will recheck fasting lipid panel at next office visit.     All other labs are within acceptable range. Please continue current medications as prescribed and follow up as scheduled.     Helen KAUR

## 2021-03-02 ENCOUNTER — LAB (OUTPATIENT)
Dept: LAB | Facility: HOSPITAL | Age: 86
End: 2021-03-02

## 2021-03-02 ENCOUNTER — TELEPHONE (OUTPATIENT)
Dept: ONCOLOGY | Facility: CLINIC | Age: 86
End: 2021-03-02

## 2021-03-02 ENCOUNTER — OFFICE VISIT (OUTPATIENT)
Dept: ONCOLOGY | Facility: CLINIC | Age: 86
End: 2021-03-02

## 2021-03-02 VITALS
OXYGEN SATURATION: 97 % | WEIGHT: 145.2 LBS | RESPIRATION RATE: 16 BRPM | BODY MASS INDEX: 22.79 KG/M2 | HEART RATE: 103 BPM | TEMPERATURE: 97.7 F | DIASTOLIC BLOOD PRESSURE: 81 MMHG | HEIGHT: 67 IN | SYSTOLIC BLOOD PRESSURE: 129 MMHG

## 2021-03-02 DIAGNOSIS — K63.5 POLYP OF COLON, UNSPECIFIED PART OF COLON, UNSPECIFIED TYPE: ICD-10-CM

## 2021-03-02 DIAGNOSIS — Z00.00 ROUTINE HEALTH MAINTENANCE: ICD-10-CM

## 2021-03-02 DIAGNOSIS — C50.919 MALIGNANT NEOPLASM OF FEMALE BREAST, UNSPECIFIED ESTROGEN RECEPTOR STATUS, UNSPECIFIED LATERALITY, UNSPECIFIED SITE OF BREAST (HCC): Primary | ICD-10-CM

## 2021-03-02 DIAGNOSIS — Z12.31 SCREENING MAMMOGRAM, ENCOUNTER FOR: ICD-10-CM

## 2021-03-02 DIAGNOSIS — C50.919 MALIGNANT NEOPLASM OF FEMALE BREAST, UNSPECIFIED ESTROGEN RECEPTOR STATUS, UNSPECIFIED LATERALITY, UNSPECIFIED SITE OF BREAST (HCC): ICD-10-CM

## 2021-03-02 LAB
ALBUMIN SERPL-MCNC: 4.2 G/DL (ref 3.5–5.2)
ALBUMIN/GLOB SERPL: 1.2 G/DL (ref 1.1–2.4)
ALP SERPL-CCNC: 96 U/L (ref 38–116)
ALT SERPL W P-5'-P-CCNC: 12 U/L (ref 0–33)
ANION GAP SERPL CALCULATED.3IONS-SCNC: 11.3 MMOL/L (ref 5–15)
AST SERPL-CCNC: 25 U/L (ref 0–32)
BASOPHILS # BLD AUTO: 0.09 10*3/MM3 (ref 0–0.2)
BASOPHILS NFR BLD AUTO: 1.5 % (ref 0–1.5)
BILIRUB SERPL-MCNC: 0.9 MG/DL (ref 0.2–1.2)
BUN SERPL-MCNC: 12 MG/DL (ref 6–20)
BUN/CREAT SERPL: 16.2 (ref 7.3–30)
CALCIUM SPEC-SCNC: 9.9 MG/DL (ref 8.5–10.2)
CHLORIDE SERPL-SCNC: 97 MMOL/L (ref 98–107)
CO2 SERPL-SCNC: 32.7 MMOL/L (ref 22–29)
CREAT SERPL-MCNC: 0.74 MG/DL (ref 0.6–1.1)
DEPRECATED RDW RBC AUTO: 52.6 FL (ref 37–54)
EOSINOPHIL # BLD AUTO: 0.13 10*3/MM3 (ref 0–0.4)
EOSINOPHIL NFR BLD AUTO: 2.2 % (ref 0.3–6.2)
ERYTHROCYTE [DISTWIDTH] IN BLOOD BY AUTOMATED COUNT: 14.7 % (ref 12.3–15.4)
GFR SERPL CREATININE-BSD FRML MDRD: 74 ML/MIN/1.73
GLOBULIN UR ELPH-MCNC: 3.6 GM/DL (ref 1.8–3.5)
GLUCOSE SERPL-MCNC: 95 MG/DL (ref 74–124)
HCT VFR BLD AUTO: 41.3 % (ref 34–46.6)
HGB BLD-MCNC: 13.1 G/DL (ref 12–15.9)
IMM GRANULOCYTES # BLD AUTO: 0.01 10*3/MM3 (ref 0–0.05)
IMM GRANULOCYTES NFR BLD AUTO: 0.2 % (ref 0–0.5)
LYMPHOCYTES # BLD AUTO: 1.54 10*3/MM3 (ref 0.7–3.1)
LYMPHOCYTES NFR BLD AUTO: 26.1 % (ref 19.6–45.3)
MCH RBC QN AUTO: 30.8 PG (ref 26.6–33)
MCHC RBC AUTO-ENTMCNC: 31.7 G/DL (ref 31.5–35.7)
MCV RBC AUTO: 96.9 FL (ref 79–97)
MONOCYTES # BLD AUTO: 0.54 10*3/MM3 (ref 0.1–0.9)
MONOCYTES NFR BLD AUTO: 9.2 % (ref 5–12)
NEUTROPHILS NFR BLD AUTO: 3.59 10*3/MM3 (ref 1.7–7)
NEUTROPHILS NFR BLD AUTO: 60.8 % (ref 42.7–76)
NRBC BLD AUTO-RTO: 0 /100 WBC (ref 0–0.2)
PLATELET # BLD AUTO: 191 10*3/MM3 (ref 140–450)
PMV BLD AUTO: 10.9 FL (ref 6–12)
POTASSIUM SERPL-SCNC: 3.6 MMOL/L (ref 3.5–4.7)
PROT SERPL-MCNC: 7.8 G/DL (ref 6.3–8)
RBC # BLD AUTO: 4.26 10*6/MM3 (ref 3.77–5.28)
SODIUM SERPL-SCNC: 141 MMOL/L (ref 134–145)
WBC # BLD AUTO: 5.9 10*3/MM3 (ref 3.4–10.8)

## 2021-03-02 PROCEDURE — 85025 COMPLETE CBC W/AUTO DIFF WBC: CPT

## 2021-03-02 PROCEDURE — 36415 COLL VENOUS BLD VENIPUNCTURE: CPT

## 2021-03-02 PROCEDURE — 99213 OFFICE O/P EST LOW 20 MIN: CPT | Performed by: INTERNAL MEDICINE

## 2021-03-02 PROCEDURE — 80053 COMPREHEN METABOLIC PANEL: CPT

## 2021-03-02 NOTE — TELEPHONE ENCOUNTER
Call to Ms. Quinn to let her know Dr. Lyons was referring her to gastroenterologist. No answer x 3, so message was left with direct number to return call [phone].   Call to patient's daughter, Alida Gonzales, to let her know about referral to gastroenterologist.  Daughter verbalized understanding and stated that her mother was very hard of hearing on the phone and she sometimes does not hear calls.   Message to scheduling to help set up referral to [facility].

## 2021-03-02 NOTE — PROGRESS NOTES
Subjective .     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 The mammogram showed the lesion to be 1.3 x 1.7 cm. She had a digital mammogram that showed a 1.8 cm mass in the left breast at 6 o'clock position. The biopsy was consistent wi t h invasive ductal carcinoma, grade 2, [place] score of 6 out of 9, ER positive greater than 90%, VT positive greater than 90%, HER-2/shannon 2+ by immunohistochemistry but negative by FISH. She is status post lumpectomy which showed a 1.3 x 1.7 cm mass, gr a de 2, Kallie score of 6 out of 9 and there was a component of DCIS. A second focus of DCIS was present along the superior margin away from the actual mass and the superior margin was involved. As a result she underwent re-excision and this was negati ve. She had 3 lymph nodes removed and there was macrometastasis in 1 lymph node which was about 0.4 cm. Currently the patient refuses chemotherapy and is going to be started on hormonal treatment with Arimidex.      HISTORY OF PRESENT ILLNESS:  The patient is a 89 y.o. year old female who is here for follow-up with the above-mentioned history.    History of Present Illness     Patient is currently doing well except that she complains of pain in the left lower part of the breast as well as left upper quadrant of the abdomen.  But it is intermittent and not so bad.  She has had it for a long time.  His weight is stable.  She underwent screening mammogram August 27 which was negative and is not due till August of this year.  We discussed about considering obtaining a CT abdomen pelvis to check on the pain but she says it goes away and she wants to wait for it as its not very significant.  Patient had last colonoscopy December 2016 and had 2 flat polyps which were resected which showed tubular adenoma with low-grade dysplasia.  This was in the rectosigmoid polyp.  Given her age she has held off on the scopes.        PAST MEDICAL HISTORY:    1. Significant for high cholesterol.   2. Hyperthyroidism.   3. Depression.   4. History of hypertension.  5.    PAST SURGICAL HISTORY: Three C-sections.     OB/GYN HISTORY: She started menstrual periods at age 14. She attained menopause around 50. She is  3, para 3, no miscarriages. She was 23 years old when she had her first child and 29 when she had the last one. Her children are 59, 58 and 54 years old. Two girls and 1 boy.     ONCOLOGIC HISTORY      ONCOLOGIC HISTORY: The patient is an 82-year-old female who likes to be called Tati who has noticed a bump in the left breast. As a result, she went to her primary care physician and mammogram was ordered. On 2014 she underwent mammogram at [facility] and the mammogram showed evidence of a 1.3 x 0.7 x 0.9 cm irregular hypoechoic mass that extends into the skin and in the left breast at the 7 o'clock position 11 cm from the nipple. As a result a biopsy was recommended. The mammogra m on the right was negative. She underwent a bilateral digital mammogram which showed that she had scattered fibroglandular densities throughout both breasts within the posterior one-third of the left breast at the 6 o'clock position . At the inframammary fold there was an irregular mass that measured 1.8 cm in greatest dimension with spiculation and architectural distortion noted. No abnormality was seen on the right breast. Mildly prominent left axillary lymph node was noted. Ultrasound was done which showed that there was a 1.3 x 0.7 x 0.9 cm irregular mass that extended into the skin. As a result, the patient underwent ultrasound-guided biopsy on 2014. The pathology, accession #[id number]. 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 The histologic grade was 2, Kallie score was 6 out of 9 and the maximal span of invasive carcinoma in the core was 8 mm. The estrogen  receptors were done and was greater than 90%, progestero n e receptor was 90%, HER-2 by immunohistochemistry was 2+ but by FISH HER/2 ratio was 1.4 which is negative. Subsequently, the patient was referred to Dr. Bagley and MRI of the breast was performed on 05/02/2014 and MRI showed that within the left breas t at 6 o'clock position in the posterior one-third near the inframammary fold there was an irregular enhancing mass with internal metallic clip. There was linear enhancement extending anteriorly away from the mass. The mass itself measured 2 cm in the sup e rior to inferior dimension, 2 cm in anterior to posterior dimension and 1.9 cm in the medial to lateral dimension and extending anteriorly away from the mass and contiguous with the mass is a linear clump enhancement that measured about 2.5 cm in the ant e rior posterior dimension. This brings the greatest dimension of the mass and adjacent near clump enhancement to be a total of 4.4 cm. There were no other areas that were suspicious. The right breast was negative. As a result the patient underwent surger y . She underwent left breast lumpectomy with sentinel lymph node sampling on 05/22/2014. The pathology accession number is [id number]. Pathology is consistent with invasive ductal carcinoma intermediate grade, [place] score 6, tumor size 1.5 x 1.3 x 1.2 c m. 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 Intermediate nuclear grade minor component margins were focally involved by DCIS of the superior margin. There is a focus of intermediate grade DCIS located approximately 1.3 cm from the invasive carcinoma, which involves the superior margin and distance of invasive carcinoma from the closest margin was 1 mm superiorly and distance of in situ carcinoma from the closest margins was involved superiorly. There were 3 lymph nodes removed. There was micrometast a sis in 1 lymph node and the size of the micrometastasis was 0.4 cm and it is a [id number] invasive  ductal carcinoma. There was tumor invasion of dermis present. The patient then had to undergo a second surgery on 2014 because of positive margin. She u nderwent re-excision of the superior margin of the left breast. The pathology accession number is [id number] and the patient underwent a left breast lumpectomy re-excision. Focal non-atypical ductal hyperplasia, extensive fat necrosis with fibrosis and mix e d inflammation, no atypia. The patient then was referred here for the evaluation of options of treatment. She has already seen Dr. Pat Sage yesterday with plans of starting radiation on Monday. She has healed up from the surgery. She is here to disc uss options of further treatment from us. Currently she has no complaints except for a cough, which is chronic, which is likely secondary to allergies as the cough gets worse during the allergy season.    Patient has been on Arimidex since 2014 with olive ns to continue a total of 5 years.    Radiation between 2014 to 2014.    The 2015 mammogram is benign. We will continue Arimidex.    2016 mammogram negative.  Screening mammogram 2018-  [place] mammogram 2020 negative    MEDICATIONS: The current medication list was reviewed with the patient and updates in the EMR this date per the medical assistant. Medication dosages and frequencies were confirmed to be accurate.     ALLERGIES: Not allergic to any medications.     SOCIAL HISTORY: She is  and lives with her . She is very much into gardening and reading. She does not smoke. She does not drink alcohol. She has no history of any previous blood transfusions  .   FAMILY HISTORY: Father  of a heart attack at 69. Mother had colon cancer and  of colon cancer at 76. She has 1 brother who is 77 and in good health. There is no other family history of breast cancer.             Current Outpatient Medications on File Prior to Visit   Medication Sig Dispense  Refill   • alendronate (FOSAMAX) 35 MG tablet TAKE 1 TABLET EVERY 7 (SEVEN) DAYS. 12 tablet 3   • amitriptyline (ELAVIL) 25 MG tablet TAKE 1 TABLET BY MOUTH AT BEDTIME  90 tablet 0   • Azelastine-Fluticasone 137-50 MCG/ACT suspension 2 sprays into each nostril 2 (Two) Times a Day. 1 bottle 1   • Calcium Carb-Cholecalciferol (CALCIUM 1000 + D) 1000-800 MG-UNIT tablet Take  by mouth.     • hydroCHLOROthiazide (HYDRODIURIL) 25 MG tablet TAKE 1 TABLET EVERY DAY 90 tablet 1   • levothyroxine (SYNTHROID, LEVOTHROID) 75 MCG tablet TAKE 1 TABLET EVERY DAY 90 tablet 1   • metoprolol tartrate (LOPRESSOR) 25 MG tablet TAKE 1 TABLET BY MOUTH TWO TIMES A DAY  60 tablet 3   • Multiple Vitamins-Minerals (MULTIVITAMIN ADULT PO) Take  by mouth.     • neomycin-polymyxin-hydrocortisone (CORTISPORIN) 3.5-83335-6 otic solution Administer 3 drops to the right ear 4 (Four) Times a Day. 10 mL 0   • potassium chloride ER (K-TAB) 20 MEQ tablet controlled-release ER tablet Take 1 tablet by mouth 2 (Two) Times a Day. 180 tablet 1   • rivaroxaban (Xarelto) 20 MG tablet Take 1 tablet by mouth Daily. 90 tablet 1   • zolpidem (AMBIEN) 5 MG tablet      • [DISCONTINUED] chlorhexidine (PERIDEX) 0.12 % solution        No current facility-administered medications on file prior to visit.          Review of Systems   Constitutional: Positive for fatigue (03/02/21-Unchanged). Negative for activity change, appetite change, chills, diaphoresis, fever and unexpected weight change.   HENT: Positive for hearing loss (03/02/21-Unchanged). Negative for mouth sores, nosebleeds, sore throat and trouble swallowing.    Respiratory: Negative for cough, chest tightness, shortness of breath and wheezing.    Cardiovascular: Negative for chest pain, palpitations and leg swelling.   Gastrointestinal: Negative for abdominal distention, abdominal pain, constipation, diarrhea, nausea and vomiting.   Genitourinary: Negative for difficulty urinating, dysuria, frequency,  "hematuria and urgency.   Musculoskeletal: Positive for arthralgias (03/02/21-Unchanged) and myalgias (03/02/21-Unchanged). Negative for joint swelling.        No muscle weakness.   Skin: Negative for rash and wound.   Neurological: Negative for dizziness, seizures, syncope, speech difficulty, weakness, numbness and headaches.   Hematological: Negative for adenopathy. Does not bruise/bleed easily.   Psychiatric/Behavioral: Negative for behavioral problems, confusion, sleep disturbance and suicidal ideas.   All other systems reviewed and are negative.  I have reviewed and confirmed the accuracy of the ROS as documented by the MA/LPN/MYRA Lyons MD  Patient complains of pain in the left upper quadrant mild, intermittent currently not present      Objective      Vitals:    03/02/21 1335   BP: 129/81   Pulse: 103   Resp: 16   Temp: 97.7 °F (36.5 °C)   TempSrc: Temporal   SpO2: 97%   Weight: 65.9 kg (145 lb 3.2 oz)   Height: 170.2 cm (67.01\")   PainSc: 0-No pain     Current Status 3/2/2021   ECOG score 0       Physical Exam   Constitutional: She is oriented to person, place, and time. She appears well-developed. No distress.   HENT:   Head: Normocephalic and atraumatic.   Mouth/Throat: No oropharyngeal exudate.   Eyes: Pupils are equal, round, and reactive to light.   Neck: Normal range of motion.   Cardiovascular: Normal rate and normal heart sounds. An irregularly irregular rhythm present.   No murmur heard.  Pulmonary/Chest: Effort normal and breath sounds normal. No respiratory distress. She has no wheezes. She has no rhonchi. She has no rales.   Abdominal: Soft. Normal appearance and bowel sounds are normal. She exhibits no distension.   Musculoskeletal: Normal range of motion.   Neurological: She is alert and oriented to person, place, and time.   Skin: Skin is warm and dry. No rash noted.   Vitals reviewed.   I have reviewed and confirmed the accuracy of the ROS as documented by the MA/LPN/MYRA Lyons" MD        RECENT LABS:  Hematology WBC   Date Value Ref Range Status   03/02/2021 5.90 3.40 - 10.80 10*3/mm3 Final   02/11/2020 5.96 3.40 - 10.80 10*3/mm3 Final     RBC   Date Value Ref Range Status   03/02/2021 4.26 3.77 - 5.28 10*6/mm3 Final   02/11/2020 3.86 3.77 - 5.28 10*6/mm3 Final     Hemoglobin   Date Value Ref Range Status   03/02/2021 13.1 12.0 - 15.9 g/dL Final     Hematocrit   Date Value Ref Range Status   03/02/2021 41.3 34.0 - 46.6 % Final     Platelets   Date Value Ref Range Status   03/02/2021 191 140 - 450 10*3/mm3 Final        Assessment/Plan      1. This is a patient with history of T1N1MX invasive ductal carcinoma of the left breast status post surgery 2014, status post radiation,   · Started Arimidex July 2014.    Patient complains of issues with weakness in her lower extremities.  After reviewing discussion with Dr. Lyons it was recommended that the patient go ahead and discontinue Arimidex as she has completed over 6 years.  We recommend she discontinue due to side effects as well as osteoporosis.  This was given to patient in written form as well.   · Reviewed last mammogram from August 27, 2020 which is negative    2.  Osteoporosis: Seen on DEXA scan 8/3/2020.  She is taking calcium, vitamin D and fosomax.    3.  HX A-fib: On Xarelto, able    4.  History of colon polyps in 2016 followed by Dr. Luke Linton.  Denies any active GI bleeding.  Patient was to have repeat colonoscopy but has not had one .     5.  Pain in the left upper quadrant, mild, intermittent.  Given persistence of pain intermittently for a year we discussed about obtaining CT but patient currently reluctant.  She is to call us if the pain worsens    Plan  1. Will schedule follow-up with GI to follow-up on the need for colonoscopy as patient had polyps which showed tubular adenoma with dysplasia in the past  2. S/p completion of aromatase inhibitorx 5 years.  3. Patient on Xarelto  4. Will schedule mammogram August 31,  2021  5. Fu with me in sept 2021.      Marla Lyons MD             No ref. provider found

## 2021-03-03 ENCOUNTER — TELEPHONE (OUTPATIENT)
Dept: GASTROENTEROLOGY | Facility: CLINIC | Age: 86
End: 2021-03-03

## 2021-03-15 ENCOUNTER — BULK ORDERING (OUTPATIENT)
Dept: CASE MANAGEMENT | Facility: OTHER | Age: 86
End: 2021-03-15

## 2021-03-15 DIAGNOSIS — Z23 IMMUNIZATION DUE: ICD-10-CM

## 2021-03-29 DIAGNOSIS — G62.9 PERIPHERAL POLYNEUROPATHY: ICD-10-CM

## 2021-03-30 RX ORDER — AMITRIPTYLINE HYDROCHLORIDE 25 MG/1
TABLET, FILM COATED ORAL
Qty: 90 TABLET | Refills: 1 | OUTPATIENT
Start: 2021-03-30 | End: 2021-06-13

## 2021-04-07 ENCOUNTER — TELEPHONE (OUTPATIENT)
Dept: INTERNAL MEDICINE | Age: 86
End: 2021-04-07

## 2021-04-07 DIAGNOSIS — E03.9 HYPOTHYROIDISM, UNSPECIFIED TYPE: ICD-10-CM

## 2021-04-07 NOTE — TELEPHONE ENCOUNTER
PATIENT IS TOTALLY OUT OF HER levothyroxine (SYNTHROID, LEVOTHROID) 75 MCG tablet CAN SHE GET 10 DAYS SENT IN TO [id number] - [place], [place] - [address] - [phone]  - [phone] FX    AND A 90 DAY SUPPLY TO  [facility] - [facility] [address] - [phone] [facility] [phone] FX

## 2021-04-08 RX ORDER — LEVOTHYROXINE SODIUM 0.07 MG/1
75 TABLET ORAL DAILY
Qty: 10 TABLET | Refills: 0 | Status: SHIPPED | OUTPATIENT
Start: 2021-04-08 | End: 2022-04-15 | Stop reason: SDUPTHER

## 2021-04-08 RX ORDER — LEVOTHYROXINE SODIUM 0.07 MG/1
75 TABLET ORAL
Qty: 90 TABLET | Refills: 3 | Status: SHIPPED | OUTPATIENT
Start: 2021-04-08 | End: 2021-06-04 | Stop reason: SDUPTHER

## 2021-06-04 ENCOUNTER — OFFICE VISIT (OUTPATIENT)
Dept: INTERNAL MEDICINE | Age: 86
End: 2021-06-04

## 2021-06-04 VITALS
TEMPERATURE: 97.6 F | DIASTOLIC BLOOD PRESSURE: 68 MMHG | WEIGHT: 146 LBS | SYSTOLIC BLOOD PRESSURE: 128 MMHG | HEIGHT: 67 IN | BODY MASS INDEX: 22.91 KG/M2 | OXYGEN SATURATION: 96 % | HEART RATE: 107 BPM

## 2021-06-04 DIAGNOSIS — R41.3 MEMORY CHANGE: ICD-10-CM

## 2021-06-04 DIAGNOSIS — G47.00 INSOMNIA, UNSPECIFIED TYPE: ICD-10-CM

## 2021-06-04 DIAGNOSIS — I10 ESSENTIAL HYPERTENSION: ICD-10-CM

## 2021-06-04 DIAGNOSIS — E03.9 HYPOTHYROIDISM, UNSPECIFIED TYPE: ICD-10-CM

## 2021-06-04 DIAGNOSIS — E78.5 HYPERLIPIDEMIA, UNSPECIFIED HYPERLIPIDEMIA TYPE: Primary | ICD-10-CM

## 2021-06-04 PROCEDURE — 99214 OFFICE O/P EST MOD 30 MIN: CPT | Performed by: NURSE PRACTITIONER

## 2021-06-04 RX ORDER — TRAZODONE HYDROCHLORIDE 50 MG/1
50 TABLET ORAL NIGHTLY PRN
Qty: 30 TABLET | Refills: 2 | Status: SHIPPED | OUTPATIENT
Start: 2021-06-04

## 2021-06-04 NOTE — PROGRESS NOTES
"Chief Complaint  Hypertension and Hyperlipidemia    Subjective          Angela Quinn presents to [facility]  Hypertension  This is a chronic problem. The problem is controlled. Pertinent negatives include no anxiety, blurred vision, chest pain, headaches, peripheral edema, PND, shortness of breath or sweats. Current antihypertension treatment includes diuretics and beta blockers.   Hyperlipidemia  This is a chronic problem. The problem is controlled. Pertinent negatives include no chest pain or shortness of breath. Treatments tried: Patient wanted to stop statin at last visit. Risk factors for coronary artery disease include hypertension, post-menopausal and a sedentary lifestyle.   Insomnia  This is a chronic problem. The problem has been gradually worsening. Pertinent negatives include no chest pain or headaches. She has tried nothing for the symptoms.   Memory Loss  This is a chronic problem. The current episode started more than 1 month ago. The problem has been gradually worsening. Pertinent negatives include no chest pain or headaches.       Objective   Vital Signs:   /68   Pulse 107   Temp 97.6 °F (36.4 °C) (Temporal)   Ht 170.2 cm (67.01\")   Wt 66.2 kg (146 lb)   SpO2 96%   BMI 22.86 kg/m²     Physical Exam  Vitals and nursing note reviewed.   Constitutional:       General: She is not in acute distress.     Appearance: She is well-developed. She is not ill-appearing.   Cardiovascular:      Rate and Rhythm: Normal rate and regular rhythm.      Heart sounds: Normal heart sounds, S1 normal and S2 normal. No murmur heard.     Pulmonary:      Effort: Pulmonary effort is normal.      Breath sounds: Normal breath sounds. No decreased breath sounds, wheezing, rhonchi or rales.   Skin:     General: Skin is warm and dry.   Neurological:      Mental Status: She is alert and oriented to person, place, and time.   Psychiatric:         Speech: Speech normal.         Behavior: " Behavior normal. Behavior is cooperative.         Thought Content: Thought content normal.         Judgment: Judgment normal.        Result Review :   The following data was reviewed by: NATASHA Turner on 06/04/2021:  Common labs    Common Labsle 12/29/20 2/4/21 2/4/21 3/2/21 3/2/21     1529 1529 1331 1331   Glucose     95   Glucose  105 (A)      BUN  11   12   Creatinine  0.70   0.74   eGFR Non  Am  79   74   eGFR [place]  95      Sodium  138   141   Potassium  3.2 (A)   3.6   Chloride  95 (A)   97 (A)   Calcium  9.5   9.9   Total Protein  7.5      Albumin  4.30   4.20   Total Bilirubin  0.9   0.9   Alkaline Phosphatase  91   96   AST (SGOT)  25   25   ALT (SGPT)  13   12   WBC 7.19   5.90    Hemoglobin 13.0   13.1    Hematocrit 38.7   41.3    Platelets 226   191    Total Cholesterol   158     Triglycerides   85     HDL Cholesterol   68 (A)     LDL Cholesterol    74     (A) Abnormal value       Comments are available for some flowsheets but are not being displayed.                  Assessment and Plan    Diagnoses and all orders for this visit:    1. Hyperlipidemia, unspecified hyperlipidemia type (Primary)  Check fasting lipid panel in near future (patient non fasting today). Patient off statin medication. May consider restarting pending results.   Continue to follow and improve on low-fat, low carbohydrate diet.     -     Lipid Panel With / Chol / HDL Ratio; Future    2. Essential hypertension  Blood pressure stable on current therapy, continue same.  Check labs today and adjust dosage of medication as necessary.  Follow-up 4 months.    -     Comprehensive Metabolic Panel; Future    3. Hypothyroidism, unspecified type  -     TSH+Free T4; Future    4. Memory change  -     TSH+Free T4; Future  -     Vitamin B12; Future    5. Insomnia, unspecified type  -     traZODone (DESYREL) 50 MG tablet; Take 1 tablet by mouth At Night As Needed for Sleep.  Dispense: 30 tablet; Refill: 2        Follow Up  {Instructions Charge Capture  Follow-up Communications :23}  Return in about 4 months (around 10/4/2021) for Next scheduled follow up, fasting labs 1-2 weeks .  Patient was given instructions and counseling regarding her condition or for health maintenance advice. Please see specific information pulled into the AVS if appropriate.

## 2021-06-16 ENCOUNTER — TELEPHONE (OUTPATIENT)
Dept: INTERNAL MEDICINE | Age: 86
End: 2021-06-16

## 2021-06-16 NOTE — TELEPHONE ENCOUNTER
Caller: Angela Quinn    Relationship: Self    Best call back number: [phone]    Chief complaint: PATIENT IN A LOT OF PAIN IN BACK AND HAVING NUMBNESS IN LEG/FEET    Patient directed to call 911 or go to their nearest emergency room.     Patient verbalized understanding: [x] Yes  [] No

## 2021-06-30 ENCOUNTER — HOME HEALTH ADMISSION (OUTPATIENT)
Dept: HOME HEALTH SERVICES | Facility: HOME HEALTHCARE | Age: 86
End: 2021-06-30

## 2021-06-30 ENCOUNTER — TELEPHONE (OUTPATIENT)
Dept: INTERNAL MEDICINE | Age: 86
End: 2021-06-30

## 2021-06-30 ENCOUNTER — TRANSCRIBE ORDERS (OUTPATIENT)
Dept: HOME HEALTH SERVICES | Facility: HOME HEALTHCARE | Age: 86
End: 2021-06-30

## 2021-06-30 DIAGNOSIS — I10 ESSENTIAL HYPERTENSION, MALIGNANT: Primary | ICD-10-CM

## 2021-06-30 NOTE — TELEPHONE ENCOUNTER
Renee from [facility] would  Like to get orders for PT and eval for OT. [facility] Can you give an ok for this. Please call  [phone]

## 2021-07-01 ENCOUNTER — TELEPHONE (OUTPATIENT)
Dept: INTERNAL MEDICINE | Age: 86
End: 2021-07-01

## 2021-07-01 ENCOUNTER — HOME CARE VISIT (OUTPATIENT)
Dept: HOME HEALTH SERVICES | Facility: HOME HEALTHCARE | Age: 86
End: 2021-07-01

## 2021-07-01 DIAGNOSIS — M47.816 LUMBAR SPONDYLOSIS: Primary | ICD-10-CM

## 2021-07-01 NOTE — TELEPHONE ENCOUNTER
Sangeetha PT with [facility] called stating when she called the patient to set up home health visit, the patient stated there must have been some confusion between her and Helen because she doesn't want home health, she wants to go to [facility] outpatient PT. Please advise. Call back in case there are questions [phone].

## 2021-07-15 DIAGNOSIS — R03.0 ELEVATED BLOOD PRESSURE READING WITHOUT DIAGNOSIS OF HYPERTENSION: ICD-10-CM

## 2021-07-15 RX ORDER — HYDROCHLOROTHIAZIDE 25 MG/1
25 TABLET ORAL DAILY
Qty: 90 TABLET | Refills: 1 | OUTPATIENT
Start: 2021-07-15

## 2021-07-15 NOTE — TELEPHONE ENCOUNTER
Caller: Angela Quinn    Relationship: Self    Best call back number: [phone]    Medication needed:   Requested Prescriptions     Pending Prescriptions Disp Refills   • hydroCHLOROthiazide (HYDRODIURIL) 25 MG tablet 90 tablet 1     Sig: Take 1 tablet by mouth Daily.       LIDOCAINE PATCH 5% (DID NOT SEE THIS UNDER HER PREVIOUS PRESCRIPTIONS )    When do you need the refill by: ASAP      Does the patient have less than a 3 day supply:  [x] Yes  [] No    What is the patient's preferred pharmacy: [id number] [place] [phone] [place] [phone]

## 2021-07-17 DIAGNOSIS — R03.0 ELEVATED BLOOD PRESSURE READING WITHOUT DIAGNOSIS OF HYPERTENSION: ICD-10-CM

## 2021-07-19 RX ORDER — HYDROCHLOROTHIAZIDE 25 MG/1
TABLET ORAL
Qty: 90 TABLET | Refills: 1 | Status: SHIPPED | OUTPATIENT
Start: 2021-07-19 | End: 2022-01-10

## 2021-07-21 ENCOUNTER — TELEPHONE (OUTPATIENT)
Dept: CARDIOLOGY | Facility: CLINIC | Age: 86
End: 2021-07-21

## 2021-07-21 NOTE — TELEPHONE ENCOUNTER
Faxed copy of this telephone thread along with cardiac clearance request form from [facility] - Dr. Pierce.  Fax# [phone].  Faxed confirmation received. / NATALYA

## 2021-07-21 NOTE — TELEPHONE ENCOUNTER
Received cardiac clearance request form from [facility] - Dr. Ivan Anthony for lumbar epidural injection.  Patient is currently taking Xarelto.  They ask if this can be held for three days.  Nilda's son in law states patient is in a lot of pain.  Please advise if can clear her or if she will need to be seen.  Her last appt was with [facility] on 11/18/19.  Thank you/ NATALYA [facility] - Dr. Anthony  phone [phone]  fax# [phone]

## 2021-07-21 NOTE — TELEPHONE ENCOUNTER
Cleared to hold Xarelto x 3 days and does not need to be seen just resume Xarelto the following evening. Thanks         NATASHA Alan  [facility]   [address]  [place] [address]  Ph: [phone]  Fax: [phone]

## 2021-08-30 ENCOUNTER — APPOINTMENT (OUTPATIENT)
Dept: MAMMOGRAPHY | Facility: HOSPITAL | Age: 86
End: 2021-08-30

## 2021-09-01 ENCOUNTER — TELEPHONE (OUTPATIENT)
Dept: ONCOLOGY | Facility: CLINIC | Age: 86
End: 2021-09-01

## 2021-09-01 NOTE — TELEPHONE ENCOUNTER
Caller: Angela Quinn    Relationship to patient: Self    Best call back number: [phone]    Chief complaint: DEALING WITH BAD BACK PAIN    Type of visit: LAB AND FOLLOW UP    Requested date: PLEASE CALL TO R/S.    If rescheduling, when is the original appointment: 09/02    Additional notes: HUB UNABLE TO R/S WITHIN TIMEFRAME.

## 2021-09-02 ENCOUNTER — APPOINTMENT (OUTPATIENT)
Dept: LAB | Facility: HOSPITAL | Age: 86
End: 2021-09-02

## 2021-09-13 RX ORDER — ALENDRONATE SODIUM 35 MG/1
TABLET ORAL
Qty: 12 TABLET | Refills: 3 | Status: SHIPPED | OUTPATIENT
Start: 2021-09-13

## 2021-09-13 RX ORDER — RIVAROXABAN 20 MG/1
TABLET, FILM COATED ORAL
Qty: 90 TABLET | Refills: 1 | Status: SHIPPED | OUTPATIENT
Start: 2021-09-13 | End: 2022-07-20

## 2021-10-14 ENCOUNTER — OFFICE VISIT (OUTPATIENT)
Dept: INTERNAL MEDICINE | Age: 86
End: 2021-10-14

## 2021-10-14 VITALS
HEIGHT: 67 IN | TEMPERATURE: 96.9 F | OXYGEN SATURATION: 98 % | SYSTOLIC BLOOD PRESSURE: 126 MMHG | BODY MASS INDEX: 21.35 KG/M2 | HEART RATE: 87 BPM | DIASTOLIC BLOOD PRESSURE: 88 MMHG | WEIGHT: 136 LBS

## 2021-10-14 DIAGNOSIS — R41.3 MEMORY CHANGE: ICD-10-CM

## 2021-10-14 DIAGNOSIS — E03.9 HYPOTHYROIDISM, UNSPECIFIED TYPE: Primary | ICD-10-CM

## 2021-10-14 DIAGNOSIS — E78.5 HYPERLIPIDEMIA, UNSPECIFIED HYPERLIPIDEMIA TYPE: ICD-10-CM

## 2021-10-14 DIAGNOSIS — K63.5 HYPERPLASTIC COLONIC POLYP, UNSPECIFIED PART OF COLON: ICD-10-CM

## 2021-10-14 DIAGNOSIS — M81.0 OSTEOPOROSIS, UNSPECIFIED OSTEOPOROSIS TYPE, UNSPECIFIED PATHOLOGICAL FRACTURE PRESENCE: ICD-10-CM

## 2021-10-14 DIAGNOSIS — I48.19 PERSISTENT ATRIAL FIBRILLATION (HCC): ICD-10-CM

## 2021-10-14 DIAGNOSIS — Z80.0 FAMILY HISTORY OF COLON CANCER IN MOTHER: ICD-10-CM

## 2021-10-14 DIAGNOSIS — Z23 FLU VACCINE NEED: ICD-10-CM

## 2021-10-14 DIAGNOSIS — I10 ESSENTIAL HYPERTENSION: ICD-10-CM

## 2021-10-14 LAB
ALBUMIN SERPL-MCNC: 5.2 G/DL (ref 3.5–5.2)
ALBUMIN/GLOB SERPL: 2.9 G/DL
ALP SERPL-CCNC: 66 U/L (ref 39–117)
ALT SERPL-CCNC: 19 U/L (ref 1–33)
AST SERPL-CCNC: 25 U/L (ref 1–32)
BILIRUB SERPL-MCNC: 1.1 MG/DL (ref 0–1.2)
BUN SERPL-MCNC: 12 MG/DL (ref 8–23)
BUN/CREAT SERPL: 19 (ref 7–25)
CALCIUM SERPL-MCNC: 9.9 MG/DL (ref 8.6–10.5)
CHLORIDE SERPL-SCNC: 99 MMOL/L (ref 98–107)
CHOLEST SERPL-MCNC: 157 MG/DL (ref 0–200)
CHOLEST/HDLC SERPL: 2.31 {RATIO}
CO2 SERPL-SCNC: 32.4 MMOL/L (ref 22–29)
CREAT SERPL-MCNC: 0.63 MG/DL (ref 0.57–1)
GLOBULIN SER CALC-MCNC: 1.8 GM/DL
GLUCOSE SERPL-MCNC: 104 MG/DL (ref 65–99)
HDLC SERPL-MCNC: 68 MG/DL (ref 40–60)
LDLC SERPL CALC-MCNC: 72 MG/DL (ref 0–100)
POTASSIUM SERPL-SCNC: 3.6 MMOL/L (ref 3.5–5.2)
PROT SERPL-MCNC: 7 G/DL (ref 6–8.5)
SODIUM SERPL-SCNC: 142 MMOL/L (ref 136–145)
T4 FREE SERPL-MCNC: 1.9 NG/DL (ref 0.93–1.7)
TRIGL SERPL-MCNC: 93 MG/DL (ref 0–150)
TSH SERPL DL<=0.005 MIU/L-ACNC: 2.63 UIU/ML (ref 0.27–4.2)
VIT B12 SERPL-MCNC: 685 PG/ML (ref 211–946)
VLDLC SERPL CALC-MCNC: 17 MG/DL (ref 5–40)

## 2021-10-14 PROCEDURE — 90662 IIV NO PRSV INCREASED AG IM: CPT | Performed by: NURSE PRACTITIONER

## 2021-10-14 PROCEDURE — G0008 ADMIN INFLUENZA VIRUS VAC: HCPCS | Performed by: NURSE PRACTITIONER

## 2021-10-14 PROCEDURE — 99214 OFFICE O/P EST MOD 30 MIN: CPT | Performed by: NURSE PRACTITIONER

## 2021-10-14 RX ORDER — LIDOCAINE 50 MG/G
1 PATCH TOPICAL EVERY 24 HOURS
COMMUNITY
Start: 2021-07-30

## 2021-10-14 RX ORDER — HYDROCODONE BITARTRATE AND ACETAMINOPHEN 5; 325 MG/1; MG/1
TABLET ORAL
COMMUNITY
Start: 2021-07-21

## 2021-10-14 RX ORDER — ATORVASTATIN CALCIUM 10 MG/1
5 TABLET, FILM COATED ORAL DAILY
COMMUNITY
End: 2022-01-10

## 2021-10-14 NOTE — PROGRESS NOTES
"Chief Complaint  Hypothyroidism, Hyperlipidemia, and Back Pain    Subjective          Angela Quinn presents to [facility]  Thyroid Problem  Presents for follow-up visit. Symptoms include constipation, depressed mood and weight loss. Patient reports no weight gain. Her past medical history is significant for hyperlipidemia.   Hyperlipidemia  This is a chronic problem. The problem is controlled. Current antihyperlipidemic treatment includes statins. There are no compliance problems.  Risk factors for coronary artery disease include dyslipidemia and post-menopausal.     Recently received third JOHN for lumbar radiculopathy with very slow improvement in symptoms. Wanting refill lidocaine patches.     Patient out of date for colonoscopy.  Last colonoscopy was 2016, recommended repeat colonoscopy in 2 years.  She is requesting an expedited referral to get in with gastroenterologist of her choice as she is concerned that \"something is going on\".  She does suffer from longstanding intermittent constipation issues.       Objective   Vital Signs:   /88   Pulse 87   Temp 96.9 °F (36.1 °C) (Temporal)   Ht 170.2 cm (67.01\")   Wt 61.7 kg (136 lb)   SpO2 98%   BMI 21.30 kg/m²     Physical Exam  Vitals and nursing note reviewed.   Constitutional:       General: She is not in acute distress.     Appearance: She is well-developed. She is not ill-appearing.   Cardiovascular:      Rate and Rhythm: Normal rate and regular rhythm.      Heart sounds: Normal heart sounds, S1 normal and S2 normal. No murmur heard.      Pulmonary:      Effort: Pulmonary effort is normal.      Breath sounds: Normal breath sounds. No decreased breath sounds, wheezing, rhonchi or rales.   Skin:     General: Skin is warm and dry.   Neurological:      Mental Status: She is alert and oriented to person, place, and time.   Psychiatric:         Mood and Affect: Mood is anxious.         Speech: Speech normal.         " Behavior: Behavior normal. Behavior is cooperative.         Thought Content: Thought content normal.         Judgment: Judgment normal.        Result Review :   The following data was reviewed by: NATASHA Turner on 10/14/2021:  Common labs    Common Labsle 6/20/21 6/21/21 6/23/21   WBC 8.54 11.33 (A) 19.74 (A)   Hemoglobin 13.7 13.3 13.0   Hematocrit 42.7 41.0 40.8   Platelets 234 199 224   (A) Abnormal value            Data reviewed: GI studies           Assessment and Plan    Diagnoses and all orders for this visit:    1. Hypothyroidism, unspecified type (Primary)  -     TSH+Free T4    2. Hyperlipidemia, unspecified hyperlipidemia type  Check fasting lipid panel today.  Adjustment of medication as necessary.  Continue to follow and improve on low-fat, low carbohydrate diet.     -     Lipid Panel With / Chol / HDL Ratio    3. Flu vaccine need  -     Fluzone High-Dose 65+yrs    4. Osteoporosis, unspecified osteoporosis type, unspecified pathological fracture presence  DEXA scan UTD. Continue vitamin D/calcium supplementation.     5. Persistent atrial fibrillation (HCC)    6. Memory change  -     TSH+Free T4  -     Vitamin B12    7. Essential hypertension  Blood pressure stable on current therapy, continue same.  Check labs today and adjust dosage of medication as necessary.  Follow-up 4 months.    -     Comprehensive Metabolic Panel    8. Hyperplastic colonic polyp, unspecified part of colon  -     Ambulatory Referral For Screening Colonoscopy    9. Family history of colon cancer in mother  -     Ambulatory Referral For Screening Colonoscopy        Follow Up   Return in about 4 months (around 2/14/2022) for Next scheduled follow up.  Patient was given instructions and counseling regarding her condition or for health maintenance advice. Please see specific information pulled into the AVS if appropriate.

## 2021-10-18 ENCOUNTER — TELEPHONE (OUTPATIENT)
Dept: INTERNAL MEDICINE | Age: 86
End: 2021-10-18

## 2021-10-18 NOTE — TELEPHONE ENCOUNTER
PATIENT CALLING TO SEE IF SHE COULD BE MAILED HER AFTER VISIT SUMMARY TO [address] [place] [id number].     PLEASE ADVISE PATIENT [phone]

## 2021-11-16 ENCOUNTER — PRE-PROCEDURE SCREENING (OUTPATIENT)
Dept: GASTROENTEROLOGY | Facility: CLINIC | Age: 86
End: 2021-11-16

## 2021-11-16 NOTE — TELEPHONE ENCOUNTER
Last scope 12/12/16 in epic-- Personal of polyps-- No family history of polyps--Family history of colon cancer--Xarelro--Medications:     alendronate (FOSAMAX) 35 MG tablet    atorvastatin (LIPITOR) 10 MG tablet    Calcium Carb-Cholecalciferol (CALCIUM 1000 + D) 1000-800 MG-UNIT tablet    hydroCHLOROthiazide (HYDRODIURIL) 25 MG tablet      levothyroxine (SYNTHROID, LEVOTHROID) 75 MCG tablet      metoprolol tartrate (LOPRESSOR) 25 MG tablet    Multiple Vitamins-Minerals (MULTIVITAMIN ADULT PO)    potassium chloride ER (K-TAB) 20 MEQ tablet controlled-release ER tablet      Xarelto 20 MG tablet

## 2021-12-08 ENCOUNTER — HOSPITAL ENCOUNTER (OUTPATIENT)
Dept: MAMMOGRAPHY | Facility: HOSPITAL | Age: 86
Discharge: HOME | End: 2021-12-08
Admitting: INTERNAL MEDICINE

## 2021-12-08 DIAGNOSIS — C50.919 MALIGNANT NEOPLASM OF FEMALE BREAST, UNSPECIFIED ESTROGEN RECEPTOR STATUS, UNSPECIFIED LATERALITY, UNSPECIFIED SITE OF BREAST (HCC): ICD-10-CM

## 2021-12-08 DIAGNOSIS — Z12.31 SCREENING MAMMOGRAM, ENCOUNTER FOR: ICD-10-CM

## 2021-12-08 PROCEDURE — 77063 BREAST TOMOSYNTHESIS BI: CPT

## 2021-12-08 PROCEDURE — 77067 SCR MAMMO BI INCL CAD: CPT

## 2022-01-04 ENCOUNTER — OFFICE VISIT (OUTPATIENT)
Dept: GASTROENTEROLOGY | Facility: CLINIC | Age: 87
End: 2022-01-04

## 2022-01-04 ENCOUNTER — TELEPHONE (OUTPATIENT)
Dept: GASTROENTEROLOGY | Facility: CLINIC | Age: 87
End: 2022-01-04

## 2022-01-04 VITALS
HEIGHT: 67 IN | HEART RATE: 83 BPM | TEMPERATURE: 97.3 F | SYSTOLIC BLOOD PRESSURE: 118 MMHG | OXYGEN SATURATION: 97 % | DIASTOLIC BLOOD PRESSURE: 70 MMHG | WEIGHT: 136.8 LBS | BODY MASS INDEX: 21.47 KG/M2

## 2022-01-04 DIAGNOSIS — K59.00 CONSTIPATION, UNSPECIFIED CONSTIPATION TYPE: Primary | ICD-10-CM

## 2022-01-04 PROCEDURE — 99204 OFFICE O/P NEW MOD 45 MIN: CPT | Performed by: INTERNAL MEDICINE

## 2022-01-04 RX ORDER — SODIUM CHLORIDE, SODIUM LACTATE, POTASSIUM CHLORIDE, CALCIUM CHLORIDE 600; 310; 30; 20 MG/100ML; MG/100ML; MG/100ML; MG/100ML
30 INJECTION, SOLUTION INTRAVENOUS CONTINUOUS
Status: CANCELLED | OUTPATIENT
Start: 2022-02-16

## 2022-01-04 NOTE — PROGRESS NOTES
Chief Complaint   Patient presents with   • Constipation     Subjective   HPI  Angela Quinn is a 89 y.o. female who presents today for new patient evaluation.  She complains of change in bowel habit.  Over the last 3 to 6 months she has noticed less frequent stool and stool has become thinner sometimes even pencil thin.  She feels some fullness in her lower abdomen.  She has a history of polyps last colonoscopy in 2016 with Dr. Linton she had a 12 mm polyp removed from the cecum and a few medium sized polyps removed from the ascending colon with pathology returning consistent with hyperplastic polyps it was recommended that she have a follow-up colonoscopy in 2 years.  The patient is quite concerned today given her symptoms and the fact that she feels like she is overdue for colonoscopy she does have a family history of colon cancer in her mother she denies any blood in her stool.  Hx of Afib on Xarelto.      Objective   Vitals:    01/04/22 1325   BP: 118/70   Pulse: 83   Temp: 97.3 °F (36.3 °C)   SpO2: 97%     Physical Exam  Vitals reviewed.   Constitutional:       Appearance: She is well-developed.   HENT:      Head: Normocephalic and atraumatic.   Abdominal:      General: Bowel sounds are normal. There is no distension.      Palpations: Abdomen is soft. There is no mass.      Tenderness: There is no abdominal tenderness.      Hernia: No hernia is present.   Skin:     General: Skin is warm and dry.   Neurological:      Mental Status: She is alert and oriented to person, place, and time.   Psychiatric:         Behavior: Behavior normal.         Thought Content: Thought content normal.         Judgment: Judgment normal.              Assessment/Plan   Assessment:     1. Constipation, unspecified constipation type    2.      Personal hx of colon polyps  3.      Afib on Xarelto    Plan:   Patient will be scheduled for colonoscopy.  She is quite concerned about her fairly recent change in bowel habit in the setting of  personal history of colon polyps and a family history of colon cancer.  I did review with her her prior colonoscopy showing only hyperplastic polyps.  We did discuss that at her age there is a slight increased risk of complication associated with colonoscopy both from the procedure itself and from non-GI related postprocedural issues - however she is still quite healthy for her age and does wish to proceed with colonoscopy.            Juan Fitch M.D.  [facility]  [address] [place] [address]  Office: [phone]

## 2022-01-04 NOTE — TELEPHONE ENCOUNTER
SERA Aguilar for colonoscopy on 02/16/2022  arrive at 130pm   . Gave Prep instructions in office. ----miralax      Advised that [facility] will call with final arrival time minimum 24 hrs before procedure. IF they do not get a phone call, arrival time will stay the same as given on instructions

## 2022-01-08 DIAGNOSIS — R03.0 ELEVATED BLOOD PRESSURE READING WITHOUT DIAGNOSIS OF HYPERTENSION: ICD-10-CM

## 2022-01-10 RX ORDER — ATORVASTATIN CALCIUM 10 MG/1
TABLET, FILM COATED ORAL
Qty: 90 TABLET | Refills: 0 | Status: SHIPPED | OUTPATIENT
Start: 2022-01-10 | End: 2022-07-07

## 2022-01-10 RX ORDER — HYDROCHLOROTHIAZIDE 25 MG/1
TABLET ORAL
Qty: 90 TABLET | Refills: 1 | Status: SHIPPED | OUTPATIENT
Start: 2022-01-10 | End: 2022-05-16 | Stop reason: SDUPTHER

## 2022-02-16 ENCOUNTER — ANESTHESIA EVENT (OUTPATIENT)
Dept: GASTROENTEROLOGY | Facility: HOSPITAL | Age: 87
End: 2022-02-16

## 2022-02-16 ENCOUNTER — HOSPITAL ENCOUNTER (OUTPATIENT)
Facility: HOSPITAL | Age: 87
Setting detail: HOSPITAL OUTPATIENT SURGERY
Discharge: HOME | End: 2022-02-16
Attending: INTERNAL MEDICINE | Admitting: INTERNAL MEDICINE

## 2022-02-16 ENCOUNTER — ANESTHESIA (OUTPATIENT)
Dept: GASTROENTEROLOGY | Facility: HOSPITAL | Age: 87
End: 2022-02-16

## 2022-02-16 VITALS
HEART RATE: 70 BPM | RESPIRATION RATE: 16 BRPM | BODY MASS INDEX: 20.76 KG/M2 | OXYGEN SATURATION: 93 % | DIASTOLIC BLOOD PRESSURE: 74 MMHG | SYSTOLIC BLOOD PRESSURE: 106 MMHG | WEIGHT: 137 LBS | HEIGHT: 68 IN

## 2022-02-16 DIAGNOSIS — K59.00 CONSTIPATION, UNSPECIFIED CONSTIPATION TYPE: ICD-10-CM

## 2022-02-16 PROCEDURE — 25010000002 PROPOFOL 10 MG/ML EMULSION: Performed by: ANESTHESIOLOGY

## 2022-02-16 PROCEDURE — 88305 TISSUE EXAM BY PATHOLOGIST: CPT | Performed by: INTERNAL MEDICINE

## 2022-02-16 PROCEDURE — 45385 COLONOSCOPY W/LESION REMOVAL: CPT | Performed by: INTERNAL MEDICINE

## 2022-02-16 RX ORDER — LIDOCAINE HYDROCHLORIDE 20 MG/ML
INJECTION, SOLUTION INFILTRATION; PERINEURAL AS NEEDED
Status: DISCONTINUED | OUTPATIENT
Start: 2022-02-16 | End: 2022-02-16 | Stop reason: SURG

## 2022-02-16 RX ORDER — PROPOFOL 10 MG/ML
VIAL (ML) INTRAVENOUS CONTINUOUS PRN
Status: DISCONTINUED | OUTPATIENT
Start: 2022-02-16 | End: 2022-02-16 | Stop reason: SURG

## 2022-02-16 RX ORDER — EPHEDRINE SULFATE 50 MG/ML
INJECTION, SOLUTION INTRAVENOUS AS NEEDED
Status: DISCONTINUED | OUTPATIENT
Start: 2022-02-16 | End: 2022-02-16 | Stop reason: SURG

## 2022-02-16 RX ORDER — PROPOFOL 10 MG/ML
VIAL (ML) INTRAVENOUS AS NEEDED
Status: DISCONTINUED | OUTPATIENT
Start: 2022-02-16 | End: 2022-02-16 | Stop reason: SURG

## 2022-02-16 RX ORDER — SODIUM CHLORIDE, SODIUM LACTATE, POTASSIUM CHLORIDE, CALCIUM CHLORIDE 600; 310; 30; 20 MG/100ML; MG/100ML; MG/100ML; MG/100ML
30 INJECTION, SOLUTION INTRAVENOUS CONTINUOUS
Status: DISCONTINUED | OUTPATIENT
Start: 2022-02-16 | End: 2022-02-16 | Stop reason: HOSPADM

## 2022-02-16 RX ADMIN — EPHEDRINE SULFATE 10 MG: 50 INJECTION INTRAVENOUS at 14:31

## 2022-02-16 RX ADMIN — LIDOCAINE HYDROCHLORIDE 50 MG: 20 INJECTION, SOLUTION INFILTRATION; PERINEURAL at 14:20

## 2022-02-16 RX ADMIN — SODIUM CHLORIDE, POTASSIUM CHLORIDE, SODIUM LACTATE AND CALCIUM CHLORIDE 30 ML/HR: 600; 310; 30; 20 INJECTION, SOLUTION INTRAVENOUS at 14:12

## 2022-02-16 RX ADMIN — PROPOFOL 100 MG: 10 INJECTION, EMULSION INTRAVENOUS at 14:20

## 2022-02-16 RX ADMIN — Medication 200 MCG/KG/MIN: at 14:20

## 2022-02-16 NOTE — ANESTHESIA PREPROCEDURE EVALUATION
Anesthesia Evaluation     Patient summary reviewed and Nursing notes reviewed                Airway   Mallampati: II  TM distance: >3 FB  Neck ROM: full  No difficulty expected  Dental - normal exam     Pulmonary - normal exam   (+) sleep apnea on CPAP,   Cardiovascular     ECG reviewed  Rhythm: irregular  Rate: normal    (+) hypertension 2 medications or greater, dysrhythmias Atrial Fib, hyperlipidemia,     ROS comment: Afib/EF 53%, mod-severe MR by ECHO 9/19  PE comment: Afib/VR 70-80    Neuro/Psych  (+) numbness, psychiatric history Depression,      ROS Comment: Peripheral polyneuropathty  GI/Hepatic/Renal/Endo    (+)   thyroid problem hypothyroidism    Musculoskeletal     Abdominal  - normal exam   Substance History      OB/GYN          Other   arthritis,    history of cancer      Other Comment: Hx breast CA                Anesthesia Plan    ASA 3     MAC     intravenous induction     Anesthetic plan, all risks, benefits, and alternatives have been provided, discussed and informed consent has been obtained with: patient.        CODE STATUS:

## 2022-02-16 NOTE — ANESTHESIA POSTPROCEDURE EVALUATION
Patient: Angela Quinn    Procedure Summary     Date: 02/16/22 Room / Location:  [facility] /  [facility]    Anesthesia Start: 1414 Anesthesia Stop: 1454    Procedure: COLONOSCOPY INTO CECUM WITH COLD SNARE POLYPECTOMIES (N/A ) Diagnosis:       Constipation, unspecified constipation type      (Constipation, unspecified constipation type [K59.00])    Surgeons: Juan Fitch MD Provider: Spencer Romero MD    Anesthesia Type: MAC ASA Status: 3          Anesthesia Type: MAC    Vitals  No vitals data found for the desired time range.          Post Anesthesia Care and Evaluation    Patient location during evaluation: PHASE II  Patient participation: complete - patient participated  Level of consciousness: awake and alert  Pain management: adequate  Airway patency: patent  Anesthetic complications: No anesthetic complications  PONV Status: none  Cardiovascular status: acceptable and hemodynamically stable  Respiratory status: acceptable, nonlabored ventilation and spontaneous ventilation  Hydration status: acceptable

## 2022-02-18 LAB
LAB AP CASE REPORT: NORMAL
LAB AP DIAGNOSIS COMMENT: NORMAL
PATH REPORT.FINAL DX SPEC: NORMAL
PATH REPORT.GROSS SPEC: NORMAL

## 2022-03-07 ENCOUNTER — TELEPHONE (OUTPATIENT)
Dept: GASTROENTEROLOGY | Facility: CLINIC | Age: 87
End: 2022-03-07

## 2022-03-07 NOTE — TELEPHONE ENCOUNTER
----- Message from Juan Fitch MD sent at 3/6/2022  8:02 PM EST -----  Polyps showed no signs of cancer or advanced cancer  At her age, would not recommend further colonoscopy

## 2022-04-15 DIAGNOSIS — E03.9 HYPOTHYROIDISM, UNSPECIFIED TYPE: ICD-10-CM

## 2022-04-15 RX ORDER — LEVOTHYROXINE SODIUM 0.07 MG/1
75 TABLET ORAL DAILY
Qty: 90 TABLET | Refills: 0 | Status: SHIPPED | OUTPATIENT
Start: 2022-04-15

## 2022-04-15 NOTE — TELEPHONE ENCOUNTER
HAS NEW PATIENT APPT WITH NATASHA BURROWS - BETHANYEST WAS IN AUGUST    SHE WILL NEED REFILLS TO GET HER THROUGH UNTIL APPT     Caller: Angela Quinn    Relationship: Self    Best call back number:  [phone] (H)    Requested Prescriptions:   Requested Prescriptions     Pending Prescriptions Disp Refills   • levothyroxine (SYNTHROID, LEVOTHROID) 75 MCG tablet 10 tablet 0     Sig: Take 1 tablet by mouth Daily.        Pharmacy where request should be sent: [facility] - [address] - [phone]  - [phone] FX     Additional details provided by patient:     Does the patient have less than a 3 day supply:  [] Yes  [x] No    Bassem Camarena Rep   04/15/22 14:31 EDT

## 2022-05-16 DIAGNOSIS — R03.0 ELEVATED BLOOD PRESSURE READING WITHOUT DIAGNOSIS OF HYPERTENSION: ICD-10-CM

## 2022-05-16 RX ORDER — HYDROCHLOROTHIAZIDE 25 MG/1
25 TABLET ORAL DAILY
Qty: 90 TABLET | Refills: 0 | Status: SHIPPED | OUTPATIENT
Start: 2022-05-16 | End: 2022-07-06

## 2022-07-06 DIAGNOSIS — R03.0 ELEVATED BLOOD PRESSURE READING WITHOUT DIAGNOSIS OF HYPERTENSION: ICD-10-CM

## 2022-07-07 RX ORDER — HYDROCHLOROTHIAZIDE 25 MG/1
TABLET ORAL
Qty: 90 TABLET | Refills: 0 | Status: SHIPPED | OUTPATIENT
Start: 2022-07-07

## 2022-07-07 RX ORDER — ATORVASTATIN CALCIUM 10 MG/1
TABLET, FILM COATED ORAL
Qty: 90 TABLET | Refills: 0 | Status: SHIPPED | OUTPATIENT
Start: 2022-07-07

## 2022-07-20 RX ORDER — RIVAROXABAN 20 MG/1
TABLET, FILM COATED ORAL
Qty: 90 TABLET | Refills: 3 | Status: SHIPPED | OUTPATIENT
Start: 2022-07-20

## 2023-08-02 DIAGNOSIS — Z00.00 ROUTINE HEALTH MAINTENANCE: Primary | ICD-10-CM

## 2023-08-02 DIAGNOSIS — C50.919 MALIGNANT NEOPLASM OF FEMALE BREAST, UNSPECIFIED ESTROGEN RECEPTOR STATUS, UNSPECIFIED LATERALITY, UNSPECIFIED SITE OF BREAST: ICD-10-CM

## 2023-08-02 DIAGNOSIS — Z12.31 ENCOUNTER FOR SCREENING MAMMOGRAM FOR MALIGNANT NEOPLASM OF BREAST: ICD-10-CM

## 2023-08-16 ENCOUNTER — HOSPITAL ENCOUNTER (OUTPATIENT)
Dept: MAMMOGRAPHY | Facility: HOSPITAL | Age: 88
Discharge: HOME | End: 2023-08-16
Admitting: INTERNAL MEDICINE
Payer: MEDICARE

## 2023-08-16 DIAGNOSIS — Z12.31 ENCOUNTER FOR SCREENING MAMMOGRAM FOR MALIGNANT NEOPLASM OF BREAST: ICD-10-CM

## 2023-08-16 PROCEDURE — 77063 BREAST TOMOSYNTHESIS BI: CPT

## 2023-08-16 PROCEDURE — 77067 SCR MAMMO BI INCL CAD: CPT

## 2023-08-21 RX ORDER — RIVAROXABAN 20 MG/1
TABLET, FILM COATED ORAL
Qty: 90 TABLET | Refills: 3 | Status: SHIPPED | OUTPATIENT
Start: 2023-08-21

## 2023-09-19 NOTE — PROGRESS NOTES
Subjective .     REASONS FOR FOLLOWUP:    [id number] invasive ductal carcinoma in the left breast. The mammogram showed the lesion to be 1.3 x 1.7 cm. She had a digital mammogram that showed a 1.8 cm mass in the left breast at 6 o'clock position. The biopsy was consistent wi t h invasive ductal carcinoma, grade 2, [place] score of 6 out of 9, ER positive greater than 90%, WA positive greater than 90%, HER-2/shannon 2+ by immunohistochemistry but negative by FISH. She is status post lumpectomy which showed a 1.3 x 1.7 cm mass, gr a de 2, Kallie score of 6 out of 9 and there was a component of DCIS. A second focus of DCIS was present along the superior margin away from the actual mass and the superior margin was involved. As a result she underwent re-excision and this was negati ve. She had 3 lymph nodes removed and there was macrometastasis in 1 lymph node which was about 0.4 cm. Currently the patient refuses chemotherapy and is going to be started on hormonal treatment with Arimidex.        History of Present Illness     Ms. Quinn is a 89 y.o. who returns to the office for delayed follow-up in regards to her breast cancer.  He was last seen by Dr. Lyons March 2, 2023.  She is accompanied by her daughter today.  Her follow-up is delayed because of COVID and previous issues with low back pain.  Reports she did receive a total of 3 epidurals.     She is up-to-date on her mammogram.  Her her mammogram from 8/16/2023 was negative that any signs of malignancy.  She continues to eat and drink well and remain as active as possible.  She has had at least a 20 pound weight loss since she was last seen that was unintentional.  She does report fatigue and occasional shortness of breath with exertion. Daughter was wondering if patient could get a prescription for oxygen. No other concerns noted at this time.      PAST MEDICAL HISTORY:   Significant for high cholesterol.   Hyperthyroidism.   Depression.   History of  hypertension.     PAST SURGICAL HISTORY: Three C-sections.     OB/GYN HISTORY: She started menstrual periods at age 14. She attained menopause around 50. She is  3, para 3, no miscarriages. She was 23 years old when she had her first child and 29 when she had the last one. Her children are 59, 58 and 54 years old. Two girls and 1 boy.     ONCOLOGIC HISTORY      ONCOLOGIC HISTORY: The patient is an 82-year-old female who likes to be called Tati who has noticed a bump in the left breast. As a result, she went to her primary care physician and mammogram was ordered. On 2014 she underwent mammogram at [facility] and the mammogram showed evidence of a 1.3 x 0.7 x 0.9 cm irregular hypoechoic mass that extends into the skin and in the left breast at the 7 o'clock position 11 cm from the nipple. As a result a biopsy was recommended. The mammogra m on the right was negative. She underwent a bilateral digital mammogram which showed that she had scattered fibroglandular densities throughout both breasts within the posterior one-third of the left breast at the 6 o'clock position . At the inframammary fold there was an irregular mass that measured 1.8 cm in greatest dimension with spiculation and architectural distortion noted. No abnormality was seen on the right breast. Mildly prominent left axillary lymph node was noted. Ultrasound was done which showed that there was a 1.3 x 0.7 x 0.9 cm irregular mass that extended into the skin. As a result, the patient underwent ultrasound-guided biopsy on 2014. The pathology, accession #[id number]. 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 The histologic grade was 2, Kallie score was 6 out of 9 and the maximal span of invasive carcinoma in the core was 8 mm. The estrogen receptors were done and was greater than 90%, progestero n e receptor was 90%, HER-2 by  immunohistochemistry was 2+ but by FISH HER/2 ratio was 1.4 which is negative. Subsequently, the patient was referred to Dr. Bagley and MRI of the breast was performed on 05/02/2014 and MRI showed that within the left breas t at 6 o'clock position in the posterior one-third near the inframammary fold there was an irregular enhancing mass with internal metallic clip. There was linear enhancement extending anteriorly away from the mass. The mass itself measured 2 cm in the sup e rior to inferior dimension, 2 cm in anterior to posterior dimension and 1.9 cm in the medial to lateral dimension and extending anteriorly away from the mass and contiguous with the mass is a linear clump enhancement that measured about 2.5 cm in the ant e rior posterior dimension. This brings the greatest dimension of the mass and adjacent near clump enhancement to be a total of 4.4 cm. There were no other areas that were suspicious. The right breast was negative. As a result the patient underwent surger y . She underwent left breast lumpectomy with sentinel lymph node sampling on 05/22/2014. The pathology accession number is [id number]. Pathology is consistent with invasive ductal carcinoma intermediate grade, [place] score 6, tumor size 1.5 x 1.3 x 1.2 c m. 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 Intermediate nuclear grade minor component margins were focally involved by DCIS of the superior margin. There is a focus of intermediate grade DCIS located approximately 1.3 cm from the invasive carcinoma, which involves the superior margin and distance of invasive carcinoma from the closest margin was 1 mm superiorly and distance of in situ carcinoma from the closest margins was involved superiorly. There were 3 lymph nodes removed. There was micrometast a sis in 1 lymph node and the size of the micrometastasis was 0.4 cm and it is a [id number] invasive ductal carcinoma. There was tumor invasion of dermis present. The patient then had to  undergo a second surgery on 2014 because of positive margin. She u nderwent re-excision of the superior margin of the left breast. The pathology accession number is [id number] and the patient underwent a left breast lumpectomy re-excision. Focal non-atypical ductal hyperplasia, extensive fat necrosis with fibrosis and mix e d inflammation, no atypia. The patient then was referred here for the evaluation of options of treatment. She has already seen Dr. Pat Sage yesterday with plans of starting radiation on Monday. She has healed up from the surgery. She is here to disc uss options of further treatment from us. Currently she has no complaints except for a cough, which is chronic, which is likely secondary to allergies as the cough gets worse during the allergy season.    Patient has been on Arimidex since 2014 with olive ns to continue a total of 5 years.    Radiation between 2014 to 2014.    The 2015 mammogram is benign. We will continue Arimidex.    2016 mammogram negative.  Screening mammogram 2018-  [place] mammogram 2020 negative    MEDICATIONS: The current medication list was reviewed with the patient and updates in the EMR this date per the medical assistant. Medication dosages and frequencies were confirmed to be accurate.     ALLERGIES: Not allergic to any medications.     SOCIAL HISTORY: She is  and lives with her . She is very much into gardening and reading. She does not smoke. She does not drink alcohol. She has no history of any previous blood transfusions  .   FAMILY HISTORY: Father  of a heart attack at 69. Mother had colon cancer and  of colon cancer at 76. She has 1 brother who is 77 and in good health. There is no other family history of breast cancer.       Current Outpatient Medications on File Prior to Visit   Medication Sig Dispense Refill    alendronate (FOSAMAX) 35 MG tablet TAKE 1 TABLET EVERY 7 (SEVEN) DAYS. 12 tablet 3     atorvastatin (LIPITOR) 10 MG tablet TAKE 1 TABLET EVERY DAY 90 tablet 0    Calcium Carb-Cholecalciferol 1000-800 MG-UNIT tablet Take  by mouth.      furosemide (LASIX) 20 MG tablet       hydroCHLOROthiazide (HYDRODIURIL) 25 MG tablet TAKE 1 TABLET EVERY DAY 90 tablet 0    HYDROcodone-acetaminophen (NORCO) 5-325 MG per tablet       levothyroxine (SYNTHROID, LEVOTHROID) 75 MCG tablet Take 1 tablet by mouth Daily. 90 tablet 0    lidocaine (LIDODERM) 5 % Place 1 patch on the skin as directed by provider Daily.      metoprolol tartrate (LOPRESSOR) 25 MG tablet Take 1 tablet by mouth 2 (Two) Times a Day. 60 tablet 11    Multiple Vitamins-Minerals (MULTIVITAMIN ADULT PO) Take  by mouth.      potassium chloride ER (K-TAB) 20 MEQ tablet controlled-release ER tablet Take 1 tablet by mouth 2 (Two) Times a Day. 180 tablet 1    sulfamethoxazole-trimethoprim (BACTRIM DS,SEPTRA DS) 800-160 MG per tablet Take 1 tablet by mouth 2 (Two) Times a Day. 14 tablet 0    Xarelto 20 MG tablet TAKE 1 TABLET EVERY DAY 90 tablet 3     No current facility-administered medications on file prior to visit.       Review of Systems   Constitutional:  Positive for fatigue (Unchanged). Negative for activity change, appetite change, chills, diaphoresis, fever and unexpected weight change.   HENT:  Positive for hearing loss (Unchanged). Negative for mouth sores, nosebleeds, sore throat and trouble swallowing.    Respiratory:  Negative for cough, chest tightness, shortness of breath and wheezing.    Cardiovascular:  Negative for chest pain, palpitations and leg swelling.   Gastrointestinal:  Negative for abdominal distention, abdominal pain, constipation, diarrhea, nausea and vomiting.   Genitourinary:  Negative for difficulty urinating, dysuria, frequency, hematuria and urgency.   Musculoskeletal:  Positive for arthralgias (Unchanged) and myalgias (Unchanged). Negative for joint swelling.        No muscle weakness.   Skin:  Negative for rash and wound.  "  Neurological:  Negative for dizziness, seizures, syncope, speech difficulty, weakness, numbness and headaches.   Hematological:  Negative for adenopathy. Does not bruise/bleed easily.   Psychiatric/Behavioral:  Negative for behavioral problems, confusion, sleep disturbance and suicidal ideas.    All other systems reviewed and are negative.I have reviewed and confirmed the accuracy of the ROS as documented by the MA/AYANA/NATASHA Aguiar  Patient complains of pain in the left upper quadrant mild, intermittent currently not present      Objective      Vitals:    09/20/23 1314   BP: 125/80   Pulse: 64   Resp: 16   Temp: 97.5 °F (36.4 °C)   TempSrc: Temporal   SpO2: 97%   Weight: 58.2 kg (128 lb 6.4 oz)   Height: 172.7 cm (67.99\")   PainSc: 0-No pain     Walking O2 performed with patient.  Oxygen saturation never dropped below 95% on room air.  Patient was walking briskly and talking the entire time.  Heart rate remained steady between 70 and 80.  Patient does not require any supplemental oxygen at this time.        9/20/2023     1:13 PM   Current Status   ECOG score 1         Physical Exam   Constitutional: She is oriented to person, place, and time. She appears well-developed. No distress.   HENT:   Head: Normocephalic and atraumatic.   Mouth/Throat: No oropharyngeal exudate.   Eyes: Pupils are equal, round, and reactive to light.   Cardiovascular: Normal rate and normal heart sounds. An irregularly irregular rhythm present.   No murmur heard.  Pulmonary/Chest: Effort normal and breath sounds normal. No respiratory distress. She has no wheezes. She has no rhonchi. She has no rales.   Abdominal: Soft. Normal appearance and bowel sounds are normal. She exhibits no distension.   Musculoskeletal: Normal range of motion.   Neurological: She is alert and oriented to person, place, and time.   Skin: Skin is warm and dry. No rash noted.   Vitals reviewed. I have reviewed and confirmed the accuracy of the ROS as " documented by the MA/LPN/RN NATASHA Burt        RECENT LABS:  Hematology WBC   Date Value Ref Range Status   09/20/2023 4.40 3.40 - 10.80 10*3/mm3 Final   10/06/2022 6.10 4.5 - 11.0 10*3/uL Final     RBC   Date Value Ref Range Status   09/20/2023 3.86 3.77 - 5.28 10*6/mm3 Final   10/06/2022 3.53 (L) 4.0 - 5.2 10*6/uL Final     Hemoglobin   Date Value Ref Range Status   09/20/2023 12.5 12.0 - 15.9 g/dL Final   10/06/2022 12.4 12.0 - 16.0 g/dL Final     Hematocrit   Date Value Ref Range Status   09/20/2023 38.5 34.0 - 46.6 % Final   10/06/2022 36.4 36.0 - 46.0 % Final     Platelets   Date Value Ref Range Status   09/20/2023 160 140 - 450 10*3/mm3 Final   10/06/2022 257 140 - 440 10*3/uL Final        Assessment & Plan      1. This is a patient with history of [id number] invasive ductal carcinoma of the left breast status post surgery 2014, status post radiation,   Started Arimidex July 2014.    Patient complains of issues with weakness in her lower extremities.  After reviewing discussion with Dr. Lyons it was recommended that the patient go ahead and discontinue Arimidex as she has completed over 6 years.  We recommend she discontinue due to side effects as well as osteoporosis.  This was given to patient in written form as well.   Reviewed last mammogram from August 27, 2020 which is negative  9/20/2023: Mammogram from 8/16/2023 reviewed and negative.  Additional labs added today for patient's reports of fatigue. Ferritin, iron profile, B12, folate, and thyroid labs all remain normal.  CBC and CMP are normal.  Patient was reviewed with Dr. Lyons.  We will go ahead and have patient get a whole-body bone scan and CT of chest abdomen and pelvis to be completed before follow-up with Dr. Lyons in 4 weeks.  Concerned since she has had 20 pound weight loss that there may be potential disease recurrence.    2.  Osteoporosis: Seen on DEXA scan 8/3/2020.  She is taking calcium, vitamin D and fosomax.    3.  HX  A-fib: On Xarelto, able  9/20/2023: Patient does remain on Xarelto and remains compliant.  She denies any signs of bleeding or excessive bruising.    4.  History of colon polyps in 2016 followed by Dr. Luke Linton.  Denies any active GI bleeding.  Patient was to have repeat colonoscopy but has not had one .     5.  Pain in the left upper quadrant, mild, intermittent.  Given persistence of pain intermittently for a year we discussed about obtaining CT but patient currently reluctant.  She is to call us if the pain worsens    Plan  Additional labs added and reviewed: Iron profile, ferritin, folate, B12, TSH, and T4   Patient to be scheduled for whole-body bone scan 1 week prior to follow-up with Dr. Lyons  Patient to be scheduled for CT chest/abdomen/pelvis without IV contrast 1 week prior to follow-up with Dr. Lyons   Continue Xarelto milligrams daily  Walking O2 study performed today.  No supplemental oxygen required at this time.  Mammogram reviewed and negative.  Next mammogram will be due August 2024.    Return for follow-up with Dr. Lyons on 10/25/2023 for repeat labs and scan review.  CBC/CMP      NATASHA Burt  Reviewed with Dr. Lyons today who is in agreement with plan of care.    Labs faxed to primary care provider       No ref. provider found      I spent 50 minutes caring for Angela on this date of service. This time includes time spent by me in the following activities: preparing for the visit, reviewing tests, obtaining and/or reviewing a separately obtained history, performing a medically appropriate examination and/or evaluation, counseling and educating the patient/family/caregiver, ordering medications, tests, or procedures, referring and communicating with other health care professionals, documenting information in the medical record, independently interpreting results and communicating that information with the patient/family/caregiver, and care coordination

## 2023-09-20 ENCOUNTER — TELEPHONE (OUTPATIENT)
Dept: ONCOLOGY | Facility: CLINIC | Age: 88
End: 2023-09-20
Payer: MEDICARE

## 2023-09-20 ENCOUNTER — OFFICE VISIT (OUTPATIENT)
Dept: ONCOLOGY | Facility: CLINIC | Age: 88
End: 2023-09-20
Payer: MEDICARE

## 2023-09-20 ENCOUNTER — LAB (OUTPATIENT)
Dept: OTHER | Facility: HOSPITAL | Age: 88
End: 2023-09-20
Payer: MEDICARE

## 2023-09-20 VITALS
DIASTOLIC BLOOD PRESSURE: 80 MMHG | TEMPERATURE: 97.5 F | HEART RATE: 64 BPM | SYSTOLIC BLOOD PRESSURE: 125 MMHG | WEIGHT: 128.4 LBS | HEIGHT: 68 IN | BODY MASS INDEX: 19.46 KG/M2 | RESPIRATION RATE: 16 BRPM | OXYGEN SATURATION: 97 %

## 2023-09-20 DIAGNOSIS — C50.919 MALIGNANT NEOPLASM OF FEMALE BREAST, UNSPECIFIED ESTROGEN RECEPTOR STATUS, UNSPECIFIED LATERALITY, UNSPECIFIED SITE OF BREAST: ICD-10-CM

## 2023-09-20 DIAGNOSIS — Z12.31 ENCOUNTER FOR SCREENING MAMMOGRAM FOR MALIGNANT NEOPLASM OF BREAST: ICD-10-CM

## 2023-09-20 DIAGNOSIS — R63.4 WEIGHT LOSS, ABNORMAL: ICD-10-CM

## 2023-09-20 DIAGNOSIS — D50.8 IRON DEFICIENCY ANEMIA SECONDARY TO INADEQUATE DIETARY IRON INTAKE: ICD-10-CM

## 2023-09-20 DIAGNOSIS — R53.83 OTHER FATIGUE: ICD-10-CM

## 2023-09-20 DIAGNOSIS — R53.83 OTHER FATIGUE: Primary | ICD-10-CM

## 2023-09-20 DIAGNOSIS — Z00.00 ROUTINE HEALTH MAINTENANCE: ICD-10-CM

## 2023-09-20 LAB
ALBUMIN SERPL-MCNC: 4.3 G/DL (ref 3.5–5.2)
ALBUMIN/GLOB SERPL: 1.5 G/DL
ALP SERPL-CCNC: 87 U/L (ref 39–117)
ALT SERPL W P-5'-P-CCNC: 19 U/L (ref 1–33)
ANION GAP SERPL CALCULATED.3IONS-SCNC: 5.9 MMOL/L (ref 5–15)
AST SERPL-CCNC: 27 U/L (ref 1–32)
BASOPHILS # BLD AUTO: 0.1 10*3/MM3 (ref 0–0.2)
BASOPHILS NFR BLD AUTO: 2.3 % (ref 0–1.5)
BILIRUB SERPL-MCNC: 0.7 MG/DL (ref 0–1.2)
BUN SERPL-MCNC: 16 MG/DL (ref 8–23)
BUN/CREAT SERPL: 20 (ref 7–25)
CALCIUM SPEC-SCNC: 9.5 MG/DL (ref 8.2–9.6)
CHLORIDE SERPL-SCNC: 101 MMOL/L (ref 98–107)
CO2 SERPL-SCNC: 32.1 MMOL/L (ref 22–29)
CREAT SERPL-MCNC: 0.8 MG/DL (ref 0.57–1)
DEPRECATED RDW RBC AUTO: 56.6 FL (ref 37–54)
EGFRCR SERPLBLD CKD-EPI 2021: 69.7 ML/MIN/1.73
EOSINOPHIL # BLD AUTO: 0.2 10*3/MM3 (ref 0–0.4)
EOSINOPHIL NFR BLD AUTO: 4.5 % (ref 0.3–6.2)
ERYTHROCYTE [DISTWIDTH] IN BLOOD BY AUTOMATED COUNT: 15.4 % (ref 12.3–15.4)
FERRITIN SERPL-MCNC: 88.3 NG/ML (ref 13–150)
FOLATE SERPL-MCNC: 13.54 NG/ML (ref 4.78–24.2)
GLOBULIN UR ELPH-MCNC: 2.9 GM/DL
GLUCOSE SERPL-MCNC: 92 MG/DL (ref 65–99)
HCT VFR BLD AUTO: 38.5 % (ref 34–46.6)
HGB BLD-MCNC: 12.5 G/DL (ref 12–15.9)
IMM GRANULOCYTES # BLD AUTO: 0.01 10*3/MM3 (ref 0–0.05)
IMM GRANULOCYTES NFR BLD AUTO: 0.2 % (ref 0–0.5)
IRON 24H UR-MRATE: 88 MCG/DL (ref 37–145)
IRON SATN MFR SERPL: 21 % (ref 20–50)
LYMPHOCYTES # BLD AUTO: 1.32 10*3/MM3 (ref 0.7–3.1)
LYMPHOCYTES NFR BLD AUTO: 30 % (ref 19.6–45.3)
MCH RBC QN AUTO: 32.4 PG (ref 26.6–33)
MCHC RBC AUTO-ENTMCNC: 32.5 G/DL (ref 31.5–35.7)
MCV RBC AUTO: 99.7 FL (ref 79–97)
MONOCYTES # BLD AUTO: 0.62 10*3/MM3 (ref 0.1–0.9)
MONOCYTES NFR BLD AUTO: 14.1 % (ref 5–12)
NEUTROPHILS NFR BLD AUTO: 2.15 10*3/MM3 (ref 1.7–7)
NEUTROPHILS NFR BLD AUTO: 48.9 % (ref 42.7–76)
NRBC BLD AUTO-RTO: 0 /100 WBC (ref 0–0.2)
PLATELET # BLD AUTO: 160 10*3/MM3 (ref 140–450)
PMV BLD AUTO: 12.1 FL (ref 6–12)
POTASSIUM SERPL-SCNC: 4.9 MMOL/L (ref 3.5–5.2)
PROT SERPL-MCNC: 7.2 G/DL (ref 6–8.5)
RBC # BLD AUTO: 3.86 10*6/MM3 (ref 3.77–5.28)
SODIUM SERPL-SCNC: 139 MMOL/L (ref 136–145)
T4 FREE SERPL-MCNC: 1.55 NG/DL (ref 0.93–1.7)
TIBC SERPL-MCNC: 423 MCG/DL (ref 298–536)
TRANSFERRIN SERPL-MCNC: 284 MG/DL (ref 200–360)
TSH SERPL DL<=0.05 MIU/L-ACNC: 3.45 UIU/ML (ref 0.27–4.2)
VIT B12 BLD-MCNC: 559 PG/ML (ref 211–946)
WBC NRBC COR # BLD: 4.4 10*3/MM3 (ref 3.4–10.8)

## 2023-09-20 PROCEDURE — 80053 COMPREHEN METABOLIC PANEL: CPT | Performed by: INTERNAL MEDICINE

## 2023-09-20 PROCEDURE — 84443 ASSAY THYROID STIM HORMONE: CPT | Performed by: INTERNAL MEDICINE

## 2023-09-20 PROCEDURE — 82728 ASSAY OF FERRITIN: CPT | Performed by: INTERNAL MEDICINE

## 2023-09-20 PROCEDURE — 36415 COLL VENOUS BLD VENIPUNCTURE: CPT

## 2023-09-20 PROCEDURE — 84466 ASSAY OF TRANSFERRIN: CPT | Performed by: INTERNAL MEDICINE

## 2023-09-20 PROCEDURE — 82607 VITAMIN B-12: CPT

## 2023-09-20 PROCEDURE — 85025 COMPLETE CBC W/AUTO DIFF WBC: CPT | Performed by: INTERNAL MEDICINE

## 2023-09-20 PROCEDURE — 84439 ASSAY OF FREE THYROXINE: CPT | Performed by: INTERNAL MEDICINE

## 2023-09-20 PROCEDURE — 83540 ASSAY OF IRON: CPT | Performed by: INTERNAL MEDICINE

## 2023-09-20 PROCEDURE — 82746 ASSAY OF FOLIC ACID SERUM: CPT

## 2023-09-20 NOTE — TELEPHONE ENCOUNTER
Called patient to review additional labs that were ordered today during her visit.  Iron studies, folate, B12, and TSH are all normal.  I did review patient with Dr. Lyons today and she did recommend going ahead and scheduling patient for a whole-body scan and a CT of her chest and abdomen with only oral contrast to be completed 1 week prior to her follow-up visit on October 17, 2023.  I was unable to reach her daughter.  She has been encouraged to call back.  I will try to call back out to them again tomorrow and see if I can talk to them further.

## 2023-09-21 ENCOUNTER — TELEPHONE (OUTPATIENT)
Dept: ONCOLOGY | Facility: CLINIC | Age: 88
End: 2023-09-21
Payer: MEDICARE

## 2023-09-21 NOTE — TELEPHONE ENCOUNTER
I did attempt to call patient's daughter again today to update her on pending labs from visit yesterday and let her know that we will plan for whole-body bone scan and CT scans after further discussion with Dr. Lyons.  Her labs have all come back unremarkable we are concerned that she has had at least a 20 pound weight loss and would like to have scans completed for surveillance prior to her visit with Dr. Lyons.  There was no answer.  Message was left to call the office.

## 2023-09-21 NOTE — TELEPHONE ENCOUNTER
Pt daughter Alida informed  the Pt will be scheduled for CT scans and a bone scan prior to her appointment with Dr. Lyons. Pts labs were all satisfactory for this pt at this time. Alida MILLER/SARAH.

## 2023-09-27 ENCOUNTER — TELEPHONE (OUTPATIENT)
Dept: ONCOLOGY | Facility: CLINIC | Age: 88
End: 2023-09-27
Payer: MEDICARE

## 2023-09-27 NOTE — TELEPHONE ENCOUNTER
Returned call back to patient's daughter. Updated her on lab results and plan for imaging prior to her follow up appointment with Dr. Lyons. Date, Time and Location of bone scan and CT scan given to daughter.

## 2023-10-17 ENCOUNTER — HOSPITAL ENCOUNTER (OUTPATIENT)
Dept: NUCLEAR MEDICINE | Facility: HOSPITAL | Age: 88
Discharge: HOME | End: 2023-10-17
Payer: MEDICARE

## 2023-10-17 ENCOUNTER — HOSPITAL ENCOUNTER (OUTPATIENT)
Dept: CT IMAGING | Facility: HOSPITAL | Age: 88
Discharge: HOME | End: 2023-10-17
Admitting: NURSE PRACTITIONER
Payer: MEDICARE

## 2023-10-17 DIAGNOSIS — C50.919 MALIGNANT NEOPLASM OF FEMALE BREAST, UNSPECIFIED ESTROGEN RECEPTOR STATUS, UNSPECIFIED LATERALITY, UNSPECIFIED SITE OF BREAST: ICD-10-CM

## 2023-10-17 DIAGNOSIS — D50.8 IRON DEFICIENCY ANEMIA SECONDARY TO INADEQUATE DIETARY IRON INTAKE: ICD-10-CM

## 2023-10-17 DIAGNOSIS — R63.4 WEIGHT LOSS, ABNORMAL: ICD-10-CM

## 2023-10-17 DIAGNOSIS — R53.83 OTHER FATIGUE: ICD-10-CM

## 2023-10-17 PROCEDURE — 74176 CT ABD & PELVIS W/O CONTRAST: CPT

## 2023-10-17 PROCEDURE — 0 TECHNETIUM MEDRONATE KIT: Performed by: NURSE PRACTITIONER

## 2023-10-17 PROCEDURE — A9503 TC99M MEDRONATE: HCPCS | Performed by: NURSE PRACTITIONER

## 2023-10-17 PROCEDURE — 78306 BONE IMAGING WHOLE BODY: CPT

## 2023-10-17 PROCEDURE — 71250 CT THORAX DX C-: CPT

## 2023-10-17 RX ORDER — TC 99M MEDRONATE 20 MG/10ML
18.2 INJECTION, POWDER, LYOPHILIZED, FOR SOLUTION INTRAVENOUS
Status: COMPLETED | OUTPATIENT
Start: 2023-10-17 | End: 2023-10-17

## 2023-10-17 RX ADMIN — Medication 18.2 MILLICURIE: at 09:28

## 2023-10-25 ENCOUNTER — LAB (OUTPATIENT)
Dept: OTHER | Facility: HOSPITAL | Age: 88
End: 2023-10-25
Payer: MEDICARE

## 2023-10-25 ENCOUNTER — OFFICE VISIT (OUTPATIENT)
Dept: ONCOLOGY | Facility: CLINIC | Age: 88
End: 2023-10-25
Payer: MEDICARE

## 2023-10-25 VITALS
RESPIRATION RATE: 16 BRPM | BODY MASS INDEX: 19.54 KG/M2 | HEIGHT: 68 IN | DIASTOLIC BLOOD PRESSURE: 74 MMHG | OXYGEN SATURATION: 96 % | SYSTOLIC BLOOD PRESSURE: 138 MMHG | WEIGHT: 128.9 LBS | HEART RATE: 64 BPM | TEMPERATURE: 97.8 F

## 2023-10-25 DIAGNOSIS — E27.8 ADRENAL NODULE: ICD-10-CM

## 2023-10-25 DIAGNOSIS — C50.919 MALIGNANT NEOPLASM OF FEMALE BREAST, UNSPECIFIED ESTROGEN RECEPTOR STATUS, UNSPECIFIED LATERALITY, UNSPECIFIED SITE OF BREAST: Primary | ICD-10-CM

## 2023-10-25 DIAGNOSIS — R93.89 ABNORMAL CT SCAN, CHEST: ICD-10-CM

## 2023-10-25 DIAGNOSIS — N63.20 MASS OF LEFT BREAST, UNSPECIFIED QUADRANT: ICD-10-CM

## 2023-10-25 DIAGNOSIS — R91.1 LUNG NODULE: ICD-10-CM

## 2023-10-25 DIAGNOSIS — C50.919 MALIGNANT NEOPLASM OF FEMALE BREAST, UNSPECIFIED ESTROGEN RECEPTOR STATUS, UNSPECIFIED LATERALITY, UNSPECIFIED SITE OF BREAST: ICD-10-CM

## 2023-10-25 DIAGNOSIS — R92.8 OTHER ABNORMAL AND INCONCLUSIVE FINDINGS ON DIAGNOSTIC IMAGING OF BREAST: ICD-10-CM

## 2023-10-25 PROBLEM — E27.9 ADRENAL NODULE: Status: ACTIVE | Noted: 2023-10-25

## 2023-10-25 LAB
ALBUMIN SERPL-MCNC: 4.2 G/DL (ref 3.5–5.2)
ALBUMIN/GLOB SERPL: 1.3 G/DL
ALP SERPL-CCNC: 92 U/L (ref 39–117)
ALT SERPL W P-5'-P-CCNC: 16 U/L (ref 1–33)
ANION GAP SERPL CALCULATED.3IONS-SCNC: 7.3 MMOL/L (ref 5–15)
AST SERPL-CCNC: 27 U/L (ref 1–32)
BASOPHILS # BLD AUTO: 0.07 10*3/MM3 (ref 0–0.2)
BASOPHILS NFR BLD AUTO: 1.3 % (ref 0–1.5)
BILIRUB SERPL-MCNC: 1.2 MG/DL (ref 0–1.2)
BUN SERPL-MCNC: 13 MG/DL (ref 8–23)
BUN/CREAT SERPL: 16.5 (ref 7–25)
CALCIUM SPEC-SCNC: 10 MG/DL (ref 8.2–9.6)
CHLORIDE SERPL-SCNC: 100 MMOL/L (ref 98–107)
CO2 SERPL-SCNC: 32.7 MMOL/L (ref 22–29)
CREAT SERPL-MCNC: 0.79 MG/DL (ref 0.57–1)
DEPRECATED RDW RBC AUTO: 54.2 FL (ref 37–54)
EGFRCR SERPLBLD CKD-EPI 2021: 70.7 ML/MIN/1.73
EOSINOPHIL # BLD AUTO: 0.07 10*3/MM3 (ref 0–0.4)
EOSINOPHIL NFR BLD AUTO: 1.3 % (ref 0.3–6.2)
ERYTHROCYTE [DISTWIDTH] IN BLOOD BY AUTOMATED COUNT: 14.6 % (ref 12.3–15.4)
GLOBULIN UR ELPH-MCNC: 3.3 GM/DL
GLUCOSE SERPL-MCNC: 79 MG/DL (ref 65–99)
HCT VFR BLD AUTO: 40.3 % (ref 34–46.6)
HGB BLD-MCNC: 12.9 G/DL (ref 12–15.9)
IMM GRANULOCYTES # BLD AUTO: 0.01 10*3/MM3 (ref 0–0.05)
IMM GRANULOCYTES NFR BLD AUTO: 0.2 % (ref 0–0.5)
LYMPHOCYTES # BLD AUTO: 1.34 10*3/MM3 (ref 0.7–3.1)
LYMPHOCYTES NFR BLD AUTO: 25.5 % (ref 19.6–45.3)
MCH RBC QN AUTO: 32.4 PG (ref 26.6–33)
MCHC RBC AUTO-ENTMCNC: 32 G/DL (ref 31.5–35.7)
MCV RBC AUTO: 101.3 FL (ref 79–97)
MONOCYTES # BLD AUTO: 0.71 10*3/MM3 (ref 0.1–0.9)
MONOCYTES NFR BLD AUTO: 13.5 % (ref 5–12)
NEUTROPHILS NFR BLD AUTO: 3.05 10*3/MM3 (ref 1.7–7)
NEUTROPHILS NFR BLD AUTO: 58.2 % (ref 42.7–76)
NRBC BLD AUTO-RTO: 0 /100 WBC (ref 0–0.2)
PLATELET # BLD AUTO: 171 10*3/MM3 (ref 140–450)
PMV BLD AUTO: 12.1 FL (ref 6–12)
POTASSIUM SERPL-SCNC: 4.2 MMOL/L (ref 3.5–5.2)
PROT SERPL-MCNC: 7.5 G/DL (ref 6–8.5)
RBC # BLD AUTO: 3.98 10*6/MM3 (ref 3.77–5.28)
SODIUM SERPL-SCNC: 140 MMOL/L (ref 136–145)
WBC NRBC COR # BLD: 5.25 10*3/MM3 (ref 3.4–10.8)

## 2023-10-25 PROCEDURE — 80053 COMPREHEN METABOLIC PANEL: CPT | Performed by: INTERNAL MEDICINE

## 2023-10-25 PROCEDURE — 36415 COLL VENOUS BLD VENIPUNCTURE: CPT

## 2023-10-25 PROCEDURE — 85025 COMPLETE CBC W/AUTO DIFF WBC: CPT | Performed by: INTERNAL MEDICINE

## 2023-10-25 NOTE — PROGRESS NOTES
Subjective .     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 The mammogram showed the lesion to be 1.3 x 1.7 cm. She had a digital mammogram that showed a 1.8 cm mass in the left breast at 6 o'clock position. The biopsy was consistent wi t h invasive ductal carcinoma, grade 2, Elgin score of 6 out of 9, ER positive greater than 90%, CA positive greater than 90%, HER-2/shannon 2+ by immunohistochemistry but negative by FISH. She is status post lumpectomy which showed a 1.3 x 1.7 cm mass, gr a de 2, Kallie score of 6 out of 9 and there was a component of DCIS. A second focus of DCIS was present along the superior margin away from the actual mass and the superior margin was involved. As a result she underwent re-excision and this was negati ve. She had 3 lymph nodes removed and there was macrometastasis in 1 lymph node which was about 0.4 cm. Currently the patient refuses chemotherapy and is going to be started on hormonal treatment with Arimidex.        History of Present Illness     Ms. Quinn is a 89 y.o. who returns to the office for delayed follow-up in regards to her breast cancer.  He was last seen by Dr. Lyons March 2, 2023.  She is accompanied by her daughter today.  Her follow-up is delayed because of COVID and previous issues with low back pain.  Reports she did receive a total of 3 epidurals.     She is up-to-date on her mammogram.  Her her mammogram from 8/16/2023 was negative that any signs of malignancy.  She continues to eat and drink well and remain as active as possible.  She has had at least a 20 pound weight loss since she was last seen that was unintentional.  She does report fatigue and occasional shortness of breath with exertion. Daughter was wondering if patient could get a prescription for oxygen. No other concerns noted at this time.      October 25, 2023: Patient is doing well without any complaints.  Her daughter states that she has been losing  weight.  She had undergone a CT scan of the chest abdomen pelvis and is negative except small left breast nodule 0.6 x 1.2 cm seen on the CT scan.  Her screening mammogram was negative but we will obtain a diagnostic mammogram of the left.  She also had an adrenal nodule 1.4 x 2.4 cm.  Repeat CT was suggested 6 months.  Can 2023 and is negative    PAST MEDICAL HISTORY:   Significant for high cholesterol.   Hyperthyroidism.   Depression.   History of hypertension.     PAST SURGICAL HISTORY: Three C-sections.     OB/GYN HISTORY: She started menstrual periods at age 14. She attained menopause around 50. She is  3, para 3, no miscarriages. She was 23 years old when she had her first child and 29 when she had the last one. Her children are 59, 58 and 54 years old. Two girls and 1 boy.     ONCOLOGIC HISTORY      ONCOLOGIC HISTORY: The patient is an 82-year-old female who likes to be called Tati who has noticed a bump in the left breast. As a result, she went to her primary care physician and mammogram was ordered. On 2014 she underwent mammogram at [facility] and the mammogram showed evidence of a 1.3 x 0.7 x 0.9 cm irregular hypoechoic mass that extends into the skin and in the left breast at the 7 o'clock position 11 cm from the nipple. As a result a biopsy was recommended. The mammogra m on the right was negative. She underwent a bilateral digital mammogram which showed that she had scattered fibroglandular densities throughout both breasts within the posterior one-third of the left breast at the 6 o'clock position . At the inframammary fold there was an irregular mass that measured 1.8 cm in greatest dimension with spiculation and architectural distortion noted. No abnormality was seen on the right breast. Mildly prominent left axillary lymph node was noted. Ultrasound was done which showed that there was a 1.3 x 0.7 x 0.9 cm irregular mass that extended into the skin. As a  result, the patient underwent ultrasound-guided biopsy on 04/16/2014. The pathology, accession #[id number]. 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 The histologic grade was 2, Kallie score was 6 out of 9 and the maximal span of invasive carcinoma in the core was 8 mm. The estrogen receptors were done and was greater than 90%, progestero n e receptor was 90%, HER-2 by immunohistochemistry was 2+ but by FISH HER/2 ratio was 1.4 which is negative. Subsequently, the patient was referred to Dr. Bagley and MRI of the breast was performed on 05/02/2014 and MRI showed that within the left breas t at 6 o'clock position in the posterior one-third near the inframammary fold there was an irregular enhancing mass with internal metallic clip. There was linear enhancement extending anteriorly away from the mass. The mass itself measured 2 cm in the sup e rior to inferior dimension, 2 cm in anterior to posterior dimension and 1.9 cm in the medial to lateral dimension and extending anteriorly away from the mass and contiguous with the mass is a linear clump enhancement that measured about 2.5 cm in the ant e rior posterior dimension. This brings the greatest dimension of the mass and adjacent near clump enhancement to be a total of 4.4 cm. There were no other areas that were suspicious. The right breast was negative. As a result the patient underwent surger y . She underwent left breast lumpectomy with sentinel lymph node sampling on 05/22/2014. The pathology accession number is [id number]. Pathology is consistent with invasive ductal carcinoma intermediate grade, Kallie score 6, tumor size 1.5 x 1.3 x 1.2 c m. 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 Intermediate nuclear grade minor component margins were focally involved by DCIS of the superior margin. There is a focus of intermediate grade DCIS located approximately 1.3 cm from the invasive  carcinoma, which involves the superior margin and distance of invasive carcinoma from the closest margin was 1 mm superiorly and distance of in situ carcinoma from the closest margins was involved superiorly. There were 3 lymph nodes removed. There was micrometast a sis in 1 lymph node and the size of the micrometastasis was 0.4 cm and it is a [id number] invasive ductal carcinoma. There was tumor invasion of dermis present. The patient then had to undergo a second surgery on 06/11/2014 because of positive margin. She u nderwent re-excision of the superior margin of the left breast. The pathology accession number is [id number] and the patient underwent a left breast lumpectomy re-excision. Focal non-atypical ductal hyperplasia, extensive fat necrosis with fibrosis and mix e d inflammation, no atypia. The patient then was referred here for the evaluation of options of treatment. She has already seen Dr. Pat Sage yesterday with plans of starting radiation on Monday. She has healed up from the surgery. She is here to disc uss options of further treatment from us. Currently she has no complaints except for a cough, which is chronic, which is likely secondary to allergies as the cough gets worse during the allergy season.    Patient has been on Arimidex since July 2014 with olive ns to continue a total of 5 years.    Radiation between 07/23/2014 to 09/08/2014.    The 05/06/2015 mammogram is benign. We will continue Arimidex.    5/2016 mammogram negative.  Screening mammogram November 2018-  [facility] mammogram 8/27/2020 negative    MEDICATIONS: The current medication list was reviewed with the patient and updates in the EMR this date per the medical assistant. Medication dosages and frequencies were confirmed to be accurate.     ALLERGIES: Not allergic to any medications.     SOCIAL HISTORY: She is  and lives with her . She is very much into gardening and reading. She does not smoke. She does not drink  alcohol. She has no history of any previous blood transfusions  .   FAMILY HISTORY: Father  of a heart attack at 69. Mother had colon cancer and  of colon cancer at 76. She has 1 brother who is 77 and in good health. There is no other family history of breast cancer.       Current Outpatient Medications on File Prior to Visit   Medication Sig Dispense Refill    alendronate (FOSAMAX) 35 MG tablet TAKE 1 TABLET EVERY 7 (SEVEN) DAYS. 12 tablet 3    atorvastatin (LIPITOR) 10 MG tablet TAKE 1 TABLET EVERY DAY 90 tablet 0    Calcium Carb-Cholecalciferol 1000-800 MG-UNIT tablet Take  by mouth.      furosemide (LASIX) 20 MG tablet       hydroCHLOROthiazide (HYDRODIURIL) 25 MG tablet TAKE 1 TABLET EVERY DAY 90 tablet 0    HYDROcodone-acetaminophen (NORCO) 5-325 MG per tablet       levothyroxine (SYNTHROID, LEVOTHROID) 75 MCG tablet Take 1 tablet by mouth Daily. 90 tablet 0    lidocaine (LIDODERM) 5 % Place 1 patch on the skin as directed by provider Daily.      metoprolol tartrate (LOPRESSOR) 25 MG tablet Take 1 tablet by mouth 2 (Two) Times a Day. 60 tablet 11    Multiple Vitamins-Minerals (MULTIVITAMIN ADULT PO) Take  by mouth.      potassium chloride ER (K-TAB) 20 MEQ tablet controlled-release ER tablet Take 1 tablet by mouth 2 (Two) Times a Day. 180 tablet 1    sulfamethoxazole-trimethoprim (BACTRIM DS,SEPTRA DS) 800-160 MG per tablet Take 1 tablet by mouth 2 (Two) Times a Day. 14 tablet 0    Xarelto 20 MG tablet TAKE 1 TABLET EVERY DAY 90 tablet 3     No current facility-administered medications on file prior to visit.       Review of Systems   Constitutional:  Positive for fatigue (Unchanged). Negative for activity change, appetite change, chills, diaphoresis, fever and unexpected weight change.   HENT:  Positive for hearing loss (Unchanged). Negative for mouth sores, nosebleeds, sore throat and trouble swallowing.    Respiratory:  Negative for cough, chest tightness, shortness of breath and wheezing.   "  Cardiovascular:  Negative for chest pain, palpitations and leg swelling.   Gastrointestinal:  Negative for abdominal distention, abdominal pain, constipation, diarrhea, nausea and vomiting.   Genitourinary:  Negative for difficulty urinating, dysuria, frequency, hematuria and urgency.   Musculoskeletal:  Positive for arthralgias (Unchanged) and myalgias (Unchanged). Negative for joint swelling.        No muscle weakness.   Skin:  Negative for rash and wound.   Neurological:  Negative for dizziness, seizures, syncope, speech difficulty, weakness, numbness and headaches.   Hematological:  Negative for adenopathy. Does not bruise/bleed easily.   Psychiatric/Behavioral:  Negative for behavioral problems, confusion, sleep disturbance and suicidal ideas.    All other systems reviewed and are negative.  I have reviewed and confirmed the accuracy of the ROS as documented by the MA/LPN/RN Marla Lyons MD  Patient complains of pain in the left upper quadrant mild, intermittent currently not present      Objective      Vitals:    10/25/23 1335   BP: 138/74   Pulse: 64   Resp: 16   Temp: 97.8 °F (36.6 °C)   TempSrc: Temporal   SpO2: 96%   Weight: 58.5 kg (128 lb 14.4 oz)   Height: 172 cm (67.72\")   PainSc: 0-No pain     Walking O2 performed with patient.  Oxygen saturation never dropped below 95% on room air.  Patient was walking briskly and talking the entire time.  Heart rate remained steady between 70 and 80.  Patient does not require any supplemental oxygen at this time.        10/25/2023     1:36 PM   Current Status   ECOG score 1         Physical Exam   Constitutional: She is oriented to person, place, and time. She appears well-developed. No distress.   HENT:   Head: Normocephalic and atraumatic.   Mouth/Throat: No oropharyngeal exudate.   Eyes: Pupils are equal, round, and reactive to light.   Cardiovascular: Normal rate and normal heart sounds. An irregularly irregular rhythm present.   No murmur " heard.  Pulmonary/Chest: Effort normal and breath sounds normal. No respiratory distress. She has no wheezes. She has no rhonchi. She has no rales.   Abdominal: Soft. Normal appearance and bowel sounds are normal. She exhibits no distension.   Musculoskeletal: Normal range of motion.   Neurological: She is alert and oriented to person, place, and time.   Skin: Skin is warm and dry. No rash noted.   Vitals reviewed.   I have reviewed and confirmed the accuracy of the ROS as documented by the MA/LPN/RN Marla Lyons MD        RECENT LABS:  Hematology WBC   Date Value Ref Range Status   10/25/2023 5.25 3.40 - 10.80 10*3/mm3 Final   09/21/2023 4.71 4.5 - 11.0 10*3/uL Final     RBC   Date Value Ref Range Status   10/25/2023 3.98 3.77 - 5.28 10*6/mm3 Final   09/21/2023 3.84 (L) 4.0 - 5.2 10*6/uL Final     Hemoglobin   Date Value Ref Range Status   10/25/2023 12.9 12.0 - 15.9 g/dL Final   09/21/2023 13.4 12.0 - 16.0 g/dL Final     Hematocrit   Date Value Ref Range Status   10/25/2023 40.3 34.0 - 46.6 % Final   09/21/2023 39.2 36.0 - 46.0 % Final     Platelets   Date Value Ref Range Status   10/25/2023 171 140 - 450 10*3/mm3 Final   09/21/2023 169 140 - 440 10*3/uL Final        Assessment & Plan      1. This is a patient with history of [id number] invasive ductal carcinoma of the left breast status post surgery 2014, status post radiation,   Started Arimidex July 2014.    Patient complains of issues with weakness in her lower extremities.  After reviewing discussion with Dr. Lyons it was recommended that the patient go ahead and discontinue Arimidex as she has completed over 6 years.  We recommend she discontinue due to side effects as well as osteoporosis.  This was given to patient in written form as well.   Reviewed last mammogram from August 27, 2020 which is negative  9/20/2023: Mammogram from 8/16/2023 reviewed and negative.  Additional labs added today for patient's reports of fatigue. Ferritin, iron profile, B12,  folate, and thyroid labs all remain normal.  CBC and CMP are normal.  Patient was reviewed with Dr. Lyons.  We will go ahead and have patient get a whole-body bone scan and CT of chest abdomen and pelvis to be completed before follow-up with Dr. Lyons in 4 weeks.  Concerned since she has had 20 pound weight loss that there may be potential disease recurrence.  Tober 25th 2023: Patient is not on any endocrine therapy and followed with observation.  With Arimidex it was started in July 20 14.  And since she had completed 6 years and she had arthralgias it was discontinued after 6 years  I reviewed the results of the CT scan as well as bone scan it is negative except left breast nodule and left adrenal gland which is 2.4 cm and a repeat CT suggested  Will need to obtain left diagnostic mammogram and ghosheh    2.  Osteoporosis: Seen on DEXA scan 8/3/2020.  She is taking calcium, vitamin D and fosomax.    3.  HX A-fib: On Xarelto, able  9/20/2023: Patient does remain on Xarelto and remains compliant.  She denies any signs of bleeding or excessive bruising.    4.  History of colon polyps in 2016 followed by Dr. Luke Linton.  Denies any active GI bleeding.  Patient was to have repeat colonoscopy but has not had one .     5.  Pain in the left upper quadrant, mild, intermittent.  Given persistence of pain intermittently for a year we discussed about obtaining CT but patient currently reluctant.  She is to call us if the pain worsens    Plan  Additional labs added and reviewed: Iron profile, ferritin, folate, B12, TSH, and T4   Reviewed mammogram done recently which is negative  Reviewed CT scan and bone scan there is no distant metastasis except tiny nodule in the left adrenal gland  Repeat CT suggested in 6 months  Also will obtain left diagnostic mammogram and ultrasound  Follow-up in 6 months with me but we will catch up the results of the mammogram and ultrasound on the phone patient currently is off Arimidex 6  years of treatment      Marla Lyons MD  Reviewed with Dr. Lyons today who is in agreement with plan of care.    Labs faxed to primary care provider       No ref. provider found      I spent 50 minutes caring for Angela on this date of service. This time includes time spent by me in the following activities: preparing for the visit, reviewing tests, obtaining and/or reviewing a separately obtained history, performing a medically appropriate examination and/or evaluation, counseling and educating the patient/family/caregiver, ordering medications, tests, or procedures, referring and communicating with other health care professionals, documenting information in the medical record, independently interpreting results and communicating that information with the patient/family/caregiver, and care coordination

## 2023-11-21 ENCOUNTER — HOSPITAL ENCOUNTER (OUTPATIENT)
Dept: ULTRASOUND IMAGING | Facility: HOSPITAL | Age: 88
Discharge: HOME | End: 2023-11-21
Payer: MEDICARE

## 2023-11-21 ENCOUNTER — HOSPITAL ENCOUNTER (OUTPATIENT)
Dept: MAMMOGRAPHY | Facility: HOSPITAL | Age: 88
Discharge: HOME | End: 2023-11-21
Admitting: INTERNAL MEDICINE
Payer: MEDICARE

## 2023-11-21 DIAGNOSIS — R93.89 ABNORMAL CT SCAN, CHEST: ICD-10-CM

## 2023-11-21 DIAGNOSIS — C50.919 MALIGNANT NEOPLASM OF FEMALE BREAST, UNSPECIFIED ESTROGEN RECEPTOR STATUS, UNSPECIFIED LATERALITY, UNSPECIFIED SITE OF BREAST: ICD-10-CM

## 2023-11-21 DIAGNOSIS — E27.8 ADRENAL NODULE: ICD-10-CM

## 2023-11-21 DIAGNOSIS — R91.1 LUNG NODULE: ICD-10-CM

## 2023-11-21 DIAGNOSIS — N63.20 MASS OF LEFT BREAST, UNSPECIFIED QUADRANT: ICD-10-CM

## 2023-11-21 DIAGNOSIS — R92.8 OTHER ABNORMAL AND INCONCLUSIVE FINDINGS ON DIAGNOSTIC IMAGING OF BREAST: ICD-10-CM

## 2023-11-21 PROCEDURE — G0279 TOMOSYNTHESIS, MAMMO: HCPCS

## 2023-11-21 PROCEDURE — 76642 ULTRASOUND BREAST LIMITED: CPT

## 2023-11-21 PROCEDURE — 77065 DX MAMMO INCL CAD UNI: CPT

## 2024-01-31 NOTE — PROGRESS NOTES
QUICK REFERENCE INFORMATION:  The ABCs of Providing the Annual Wellness Visit   CMS.[employer] Medicare Subsequent Wellness Visit      Subjective   History of Present Illness    Angela Quinn is a 85 y.o. female who presents for an Subsequent Wellness Visit. In addition, we addressed the following health issues:    Hypertension: Patient's compliant with medication, dietary salt restriction but does not regularly exercise nor monitor blood pressure the outpatient setting.    Hypothyroidism, patient is compliant with medication, and there are no changes in skin texture, hair texture bowel habits or tremor.    Hyperlipidemia: Patient is compliant with medication,, she is not fasting for lab work.    Prevnar will be provided today      PMH, PSH, SocHx, FamHx, Allergies, and Medications: Reviewed and updated in the Visit Navigator.     Outpatient Medications Prior to Visit   Medication Sig Dispense Refill   • alendronate (FOSAMAX) 35 MG tablet Take 1 tablet by mouth every 7 days. 12 tablet 1   • anastrozole (ARIMIDEX) 1 MG tablet TAKE 1 TABLET BY MOUTH ONE TIME A DAY  90 tablet 0   • atorvastatin (LIPITOR) 10 MG tablet Take 1 tablet by mouth daily. 90 tablet 0   • escitalopram (LEXAPRO) 20 MG tablet Take 0.5 tablets by mouth daily. 90 tablet 0   • hydrochlorothiazide (HYDRODIURIL) 25 MG tablet Take 1 tablet by mouth daily. 90 tablet 1   • levothyroxine (SYNTHROID, LEVOTHROID) 75 MCG tablet Take 1 tablet by mouth daily. 90 tablet 1     No facility-administered medications prior to visit.        Patient Active Problem List   Diagnosis   • Breast CA   • Hypothyroidism   • HLD (hyperlipidemia)   • Blood pressure elevated without history of HTN   • Routine health maintenance   • Imbalance   • Altered bowel habits   • Colon polyp, hyperplastic   • Colon polyps   • Depression   • Diverticulitis of intestine   • Osteoarthritis   • Hearing loss   • Hypertension   • Obstructive sleep apnea syndrome   • Osteoporosis    • Urinary incontinence   • Medicare annual wellness visit, initial   • Encounter for immunization   • Cough   • Peripheral polyneuropathy       Health Habits:  Dental Exam. up to date  Eye Exam. up to date  Exercise: 0 times/week.  Current exercise activities include: none    Social:  See review in SnapShot activity and in SocHx section of Visit Navigator.    Health Risk Assessment:  The patient has completed a Health Risk Assessment. This has been reviewed with them and has been scanned into chart as a separate document.    Current Medical Providers:  Patient Care Team:  Mychal Soliz MD as PCP - General  Mychal Soliz MD as PCP - Family Medicine  Marla Lyons MD as Consulting Physician (Hematology and Oncology)    The [facility] providers who are involved in the care of this patient are listed above. Additional providers and suppliers are listed below:  Ophthalmology, dentist, oncology, internal medicine,    Recent Hospitalizations:  No recent hospitalization(s)..    Age-appropriate Screening Schedule:  Refer to the list below for future screening recommendations based on patient's age. Orders for these recommended tests are listed in the plan section. The patient has been provided with a written plan.    Health Maintenance   Topic Date Due   • LIPID PANEL  10/18/2017   • COLONOSCOPY  12/12/2017   • MEDICARE ANNUAL WELLNESS  01/03/2018   • DXA SCAN  05/09/2018   • MAMMOGRAM  05/13/2018   • TDAP/TD VACCINES (2 - Td) 02/22/2026   • INFLUENZA VACCINE  Completed   • PNEUMOCOCCAL VACCINES (65+ LOW/MEDIUM RISK)  Completed   • ZOSTER VACCINE  Completed       Depression Screen:   PHQ-9 Depression Screening 1/3/2017   Little interest or pleasure in doing things 0   Feeling down, depressed, or hopeless 0   Trouble falling or staying asleep, or sleeping too much 1   Feeling tired or having little energy 1   Poor appetite or overeating 0   Feeling bad about yourself - or that you are a failure or have let yourself  or your family down 0   Trouble concentrating on things, such as reading the newspaper or watching television 0   Moving or speaking so slowly that other people could have noticed. Or the opposite - being so fidgety or restless that you have been moving around a lot more than usual 0   Thoughts that you would be better off dead, or of hurting yourself in some way 0   PHQ-9 Total Score 2   If you checked off any problems, how difficult have these problems made it for you to do your work, take care of things at home, or get along with other people? Not difficult at all       Functional and Cognitive Screening:  Functional & Cognitive Status 1/3/2017   Do you have difficulty preparing food and eating? No   Do you have difficulty bathing yourself? No   Do you have difficulty getting dressed? No   Do you have difficulty using the toilet? No   Do you have difficulty moving around from place to place? No   In the past year have you fallen or experienced a near fall? No   Do you need help using the phone?  No   Are you deaf or do you have serious difficulty hearing?  No   Do you need help with transportation? No   Do you need help shopping? No   Do you need help preparing meals?  No   Do you need help with housework?  No   Do you need help with laundry? No   Do you need help taking your medications? No   Do you need help managing money? No   Do you have difficulty concentrating, remembering or making decisions? No       Does the patient have evidence of cognitive impairment? No    Identification of Risk Factors:  Risk factors include: inactivity.    Review of Systems   Constitutional: Negative.    HENT: Positive for hearing loss.    Eyes: Negative.    Respiratory: Positive for cough.         Noted with drainage   Cardiovascular: Negative.    Gastrointestinal: Negative.    Genitourinary: Positive for frequency (nocturia ×1).   Musculoskeletal: Negative.    Skin: Negative.         Sees dermatology regularly   Neurological:  "Negative.    Hematological: Negative.    Psychiatric/Behavioral: Negative.            Objective     Vitals:    01/03/17 1302   BP: 140/78   Pulse: 78   Temp: 97.7 °F (36.5 °C)   SpO2: 98%   Weight: 146 lb (66.2 kg)   Height: 67\" (170.2 cm)       Body mass index is 22.87 kg/(m^2).    Physical Exam   Constitutional: She is oriented to person, place, and time. She appears well-developed and well-nourished. No distress.   HENT:   Head: Normocephalic and atraumatic.   Right Ear: External ear normal.   Left Ear: External ear normal.   Mouth/Throat: Oropharynx is clear and moist.   Eyes: Conjunctivae and EOM are normal. Pupils are equal, round, and reactive to light.   Neck: Normal range of motion. Neck supple. No thyromegaly present.   Cardiovascular: Normal rate, regular rhythm, normal heart sounds and intact distal pulses.  Exam reveals no gallop and no friction rub.    No murmur heard.  Pulmonary/Chest: Effort normal and breath sounds normal. She has no wheezes. She has no rales.   Abdominal: Soft. Bowel sounds are normal. There is no hepatosplenomegaly. There is no tenderness.   Genitourinary:   Genitourinary Comments: Defer   Musculoskeletal: Normal range of motion. She exhibits no edema or deformity.   Lymphadenopathy:     She has no cervical adenopathy.   Neurological: She is alert and oriented to person, place, and time. She has normal strength and normal reflexes. She displays normal reflexes. No cranial nerve deficit or sensory deficit. Coordination normal.   Impaired vibration sensation lower extremities   Skin: Skin is warm and dry. No rash noted.   Psychiatric: She has a normal mood and affect. Her behavior is normal. Judgment and thought content normal.   Nursing note and vitals reviewed.      Assessment/Plan   Patient Self-Management and Personalized Health Advice  The patient has been provided with information about: weight management and preventive services including:   · Advanced directives: has NO " advanced directive - not interested in additional information, Diabetes screening, see lab orders, Pneumococcal vaccine .    Discussed the patient's BMI with her. The BMI is in the acceptable range.    Assessment:      #1 initial Medicare wellness evaluation.  See comments above, maintain concern today is patient's lack of hearing.  She is going to have her hearing aid service this week.  Recommend repeat exam one year.    #2 hypertension stable on current medical regimen.  Plan: Same meds, check CMP and lipid follow-up results by mail, blood pressure check 4 months.    #3 hypothyroidism, on medication, clinically euthyroid, check TSH today, adjust dosage if indicated.    #4 hyperlipidemia on medication, status to be determined.  Plan: Same meds, check CMP and lipid today follow-up results by mail and adjust dosage as indicated.    #5 immunization update, Prevnar be provided today.    #6 cough check CBC, Dymista use twice daily, patient follow-up by phone in 30 days.    #7 peripheral neuropathy by physical exam, lab as above, follow-up results by mail, begin 25 mg of Elavil at bedtime.  Follow-up 6 weeks.      Follow Up:  4 mo    An After Visit Summary and PPPS with all of these plans were given to the patient.             None

## 2024-03-20 NOTE — PROGRESS NOTES
[facility] [place] [facility]  Helen Quinn / 88 y.o. / female  08/11/2020      ASSESSMENT & PLAN:    Problem List Items Addressed This Visit        Cardiovascular and Mediastinum    HLD (hyperlipidemia) - Primary    Relevant Medications    atorvastatin (LIPITOR) 10 MG tablet    Hypertension    Relevant Medications    hydroCHLOROthiazide (HYDRODIURIL) 25 MG tablet    metoprolol tartrate (LOPRESSOR) 25 MG tablet    Other Relevant Orders    Comprehensive Metabolic Panel       Endocrine    Hypothyroidism    Relevant Medications    levothyroxine (Synthroid) 88 MCG tablet    metoprolol tartrate (LOPRESSOR) 25 MG tablet    Other Relevant Orders    TSH+Free T4       Musculoskeletal and Integument    Osteoporosis    Overview     Overview:   decrease in Vit D           Other Visit Diagnoses     Vitamin D deficiency        Relevant Orders    Vitamin D 25 Hydroxy    Rubor        Relevant Orders    Doppler Ankle Brachial Index Single Level CAR    Bilateral cold feet        Relevant Orders    Doppler Ankle Brachial Index Single Level CAR    Other specified symptoms and signs involving the circulatory and respiratory systems         Relevant Orders    Doppler Ankle Brachial Index Single Level CAR        Orders Placed This Encounter   Procedures   • Comprehensive Metabolic Panel   • TSH+Free T4   • Vitamin D 25 Hydroxy     No orders of the defined types were placed in this encounter.      Summary/Discussion:    1. Hyperlipidemia, unspecified hyperlipidemia type  Stable on current therapy. Fasting lipid panel UTD and values within acceptable ranges.   Continue to follow and improve on low-fat, low carbohydrate diet.       2. Essential hypertension  Blood pressure stable on current therapy, continue same.  Check labs today and adjust dosage of medication as necessary.  Follow-up 4 months.    - Comprehensive Metabolic Panel    3. Hypothyroidism, unspecified type  Check levels today, adjust dosage as necessary.  When I spoke to the  on 3/18 he informed me that [facility] need to be contacted in regards to scheduling appts for the pt not him since she is living there not with him. He have me number number ([phone]) but when I called the line keeps coming up that its busy. I looked up the number for [facility] and it is showing the same number the pt  gave up, but we still have not been able to contact them.       - TSH+Free T4    4. Osteoporosis, unspecified osteoporosis type, unspecified pathological fracture presence  On Fosamax.     5. Vitamin D deficiency    - Vitamin D 25 Hydroxy    6. Rubor  Suspect possible PAD. Recommend CAN of bilateral lower extremities.     - Doppler Ankle Brachial Index Single Level CAR    7. Bilateral cold feet    - Doppler Ankle Brachial Index Single Level CAR    8. Other specified symptoms and signs involving the circulatory and respiratory systems     - Doppler Ankle Brachial Index Single Level CAR    9. Grief  Patient sustained recent traumatic death experience of her . She seems to be coping appropriately, not significantly depressed at this time. Recommended resources for grief counseling/therapy as needed.       Return in about 4 months (around 12/11/2020) for Next scheduled follow up.  ____________________________________________________________________    MEDICATIONS  Current Outpatient Medications   Medication Sig Dispense Refill   • alendronate (FOSAMAX) 35 MG tablet Take 1 tablet by mouth Every 7 (Seven) Days. 12 tablet 3   • amitriptyline (ELAVIL) 25 MG tablet TAKE 1 TABLET BY MOUTH AT BEDTIME  90 tablet 3   • anastrozole (ARIMIDEX) 1 MG tablet TAKE 1 TABLET EVERY DAY 90 tablet 1   • atorvastatin (LIPITOR) 10 MG tablet Take 1 tablet by mouth Daily. 90 tablet 1   • Azelastine-Fluticasone 137-50 MCG/ACT suspension 2 sprays into each nostril 2 (Two) Times a Day. 1 bottle 1   • Calcium Carb-Cholecalciferol (CALCIUM 1000 + D) 1000-800 MG-UNIT tablet Take  by mouth.     • chlorhexidine (PERIDEX) 0.12 % solution      • hydroCHLOROthiazide (HYDRODIURIL) 25 MG tablet TAKE 1 TABLET EVERY DAY 90 tablet 1   • levothyroxine (Synthroid) 88 MCG tablet Take 1 tablet by mouth Daily. 90 tablet 3   • metoprolol tartrate (LOPRESSOR) 25 MG tablet TAKE 1 TABLET BY MOUTH TWO TIMES A DAY  60 tablet 0   • Multiple Vitamins-Minerals (MULTIVITAMIN ADULT PO) Take  by mouth.     •  "neomycin-polymyxin-hydrocortisone (CORTISPORIN) 3.5-70739-6 otic solution Administer 3 drops to the right ear 4 (Four) Times a Day. 10 mL 0   • potassium chloride ER (K-TAB) 20 MEQ tablet controlled-release ER tablet Take 1 tablet by mouth Daily. 90 tablet 1   • raloxifene (EVISTA) 60 MG tablet Take 1 tablet by mouth Daily. 30 tablet 6   • rivaroxaban (Xarelto) 20 MG tablet Take 1 tablet by mouth Daily. 90 tablet 0   • zolpidem (AMBIEN) 5 MG tablet        No current facility-administered medications for this visit.        VITALS    Visit Vitals  /80   Pulse 70   Temp 96.8 °F (36 °C) (Temporal)   Ht 170.2 cm (67.01\")   Wt 65.8 kg (145 lb)   SpO2 95%   BMI 22.70 kg/m²       BP Readings from Last 3 Encounters:   08/11/20 142/80   02/11/20 140/72   12/15/19 160/84     Wt Readings from Last 3 Encounters:   08/11/20 65.8 kg (145 lb)   02/11/20 66.8 kg (147 lb 3.2 oz)   11/18/19 66.2 kg (146 lb)      Body mass index is 22.7 kg/m².    CC:  Main reason(s) for today's visit: Follow-up for hypertension and hyperlipidemia, hypothyroidism, red/cold feet     HPI:   Chronic essential hypertension:  Since prior visit: compliant with medication(s) and denies significant problems with medication(s). Currently taking hydrochlorothiazide (HCTZ) and metoprolol (Lopressor, Toprol). She is not exercising, but is adherent to low sodium diet.  She denies symptoms of chest pain/pressure, dyspnea, swelling, vision impairment. CVD risk factors include: advanced age (>55 for males, >65 for females), dyslipidemia, HTN, obesity (BMI>=30 kg/m2), and sedentary lifestyle. Use of agents associated with HTN: no alcohol use and no tobacco use . Most recent in-office blood pressure readings:   BP Readings from Last 3 Encounters:   08/11/20 142/80   02/11/20 140/72   12/15/19 160/84       Chronic hyperlipidemia:  Current therapy include atorvastatin.    Most recent labs:   Lab Results   Component Value Date    LDL 75 02/11/2020     (H) " "06/25/2018    LDL 82 01/04/2017    HDL 65 (H) 02/11/2020    HDL 63 (H) 06/25/2018    HDL 67 (H) 01/04/2017    TRIG 94 02/11/2020    CHOLHDLRATIO 2.45 02/11/2020        Hypothyroidism  Angela Quinn is a 88 y.o. female who presents for follow up of hypothyroidism. Current symptoms: change in energy level . Patient denies diarrhea, heat / cold intolerance, nervousness, palpitations and weight changes. Symptoms have stabilized.    Reports recent death of  in April. Apparently he suddenly passed away at home after a progressive episode of weakness and dementia. Patient was home when he went unconscious and had to perform CPR on him. Overall, she is grieving his death but has good support system at home, no significant depression.     Edema: Patient complains of edema. The location of the edema is lower leg(s) bilateral.  The edema has been mild.  Onset of symptoms was several months ago, unchanged since that time. The edema is present in the evening. The patient states never.  The swelling has been aggravated by dependency of involved area, relieved by elevation of involved area, and been associated with nothing.   She reports feet are \"bright red\" in the morning, cool to touch. She denies any claudication symptoms of lower extremities.         Patient Care Team:  Helen Dean APRN as PCP - General (Internal Medicine)  Helen Dean APRN [facility] as Consulting Physician (Hematology and Oncology)  Gregor Bagley MD as Referring Physician (Breast Surgery)  Kyra Sage MD as Consulting Physician (Radiation Oncology)  Dorene Malloy MD as Consulting Physician (Ophthalmology)  Luke Linton MD as Consulting Physician (Gastroenterology)  Blake Lara Jr., MD as Consulting Physician (Cardiology)  ____________________________________________________________________      REVIEW OF SYSTEMS    Review of Systems   Constitutional: Negative for activity " change, appetite change and unexpected weight change.   HENT: Negative for tinnitus.    Eyes: Negative for visual disturbance.   Respiratory: Negative for cough, chest tightness and shortness of breath.    Cardiovascular: Positive for leg swelling. Negative for chest pain and palpitations.   Neurological: Negative for dizziness, light-headedness and headaches.         PHYSICAL EXAMINATION    Physical Exam   Constitutional: She is oriented to person, place, and time. Vital signs are normal. She appears well-developed and well-nourished. She is cooperative. She does not appear ill. No distress.   Cardiovascular: Normal rate, S1 normal, S2 normal and normal heart sounds. An irregular rhythm present.   No murmur heard.  Pulses:       Dorsalis pedis pulses are 1+ on the right side, and 1+ on the left side.   Feet are cool to touch, slightly purplish in color.   Cap refill approximately 5 seconds.   Spider veins/ mild varicosities noted of bilateral lower extremities.    Pulmonary/Chest: Effort normal and breath sounds normal. She has no decreased breath sounds. She has no wheezes. She has no rhonchi. She has no rales.   Neurological: She is alert and oriented to person, place, and time.   Skin: Skin is warm, dry and intact.   Psychiatric: She has a normal mood and affect. Her speech is normal and behavior is normal. Judgment and thought content normal. Cognition and memory are normal.   Nursing note and vitals reviewed.      REVIEWED DATA:    Labs:   Lab Results   Component Value Date     03/16/2020    K 3.6 03/16/2020    AST 25 02/11/2020    ALT 15 02/11/2020    BUN 17 03/16/2020    CREATININE 0.76 03/16/2020    CREATININE 0.78 02/11/2020    CREATININE 0.67 08/07/2019    EGFRIFNONA 72 03/16/2020    EGFRIFAFRI 87 03/16/2020       Lab Results   Component Value Date    GLUCOSE 84 08/07/2019    GLUCOSE 91 04/09/2019    GLUCOSE 128 (H) 10/23/2018       Lab Results   Component Value Date    LDL 75 02/11/2020      (H) 06/25/2018    LDL 82 01/04/2017    HDL 65 (H) 02/11/2020    HDL 63 (H) 06/25/2018    HDL 67 (H) 01/04/2017    TRIG 94 02/11/2020    TRIG 85 06/25/2018    TRIG 148 01/04/2017    CHOLHDLRATIO 2.45 02/11/2020       Lab Results   Component Value Date    TSH 8.750 (H) 03/16/2020    FREET4 1.51 03/16/2020          Lab Results   Component Value Date    WBC 5.96 02/11/2020    HGB 12.3 02/11/2020    HGB 14.0 09/24/2019    HGB 13.3 08/07/2019     02/11/2020       Lab Results   Component Value Date    LEUKOCYTESUR Moderate (2+) (A) 08/07/2019       Imaging:        Medical Tests:        Summary of old records / correspondence / consultant report:        Request outside records:        ALLERGIES  No Known Allergies     PFSH:     The following portions of the patient's history were reviewed and updated as appropriate: Allergies / Current Medications / Past Medical History / Surgical History / Social History / Family History    PROBLEM LIST   Patient Active Problem List   Diagnosis   • Breast CA (CMS/HCC)   • Hypothyroidism   • HLD (hyperlipidemia)   • Routine health maintenance   • Imbalance   • Altered bowel habits   • Colon polyp, hyperplastic   • Colon polyps   • Depression   • Diverticulitis of intestine   • Osteoarthritis   • Hearing loss   • Hypertension   • NISHI (obstructive sleep apnea)   • Osteoporosis   • Urinary incontinence   • Medicare annual wellness visit, initial   • Encounter for immunization   • Peripheral polyneuropathy   • Vaginal discharge   • Osteopenia of both lower legs   • Fibroids, submucosal   • Persistent atrial fibrillation (CMS/HCC)       PAST MEDICAL HISTORY  Past Medical History:   Diagnosis Date   • Ankle fracture 2006    Right   • Breast cancer (CMS/HCC)    • Cancer (CMS/HCC)     Left breast cancer, [id number] invasive ductal carcinoma   • Cataract    • Colon polyp, hyperplastic 6/13/2016   • Depression    • Disease of thyroid gland    • DJD (degenerative joint disease)    • Glaucoma    •  History of diverticulitis    • [place] (hard of hearing)    • Hyperlipidemia    • Hypertension    • Left anterior hemiblock    • Osteopenia    • Osteoporosis    • Radiation    • Skin cancer of nose    • Sleep apnea        SURGICAL HISTORY  Past Surgical History:   Procedure Laterality Date   • BREAST BIOPSY Left    • BREAST LUMPECTOMY Left    • CATARACT EXTRACTION     •  SECTION      x3   • COLONOSCOPY     • COLONOSCOPY N/A 2016    Procedure:  colonoscopy into cecum with saline injection, hot snare polypectomy, APC used in ascending polyp area;  Surgeon: Luke Linton MD;  Location: [facility] ENDOSCOPY;  Service:    • COLONOSCOPY N/A 2016    Procedure: COLONOSCOPY into cecum with polypectomies and methylene blue inj and saline inj. (5 cc.);  Surgeon: Luke Linton MD;  Location: [facility] ENDOSCOPY;  Service:    • COLONOSCOPY W/ BIOPSIES     • MYOMECTOMY  2017    Partial   • POLYPECTOMY     • SKIN CANCER EXCISION      Nose       SOCIAL HISTORY  Social History     Socioeconomic History   • Marital status:      Spouse name: Gregor   • Number of children: 3   • Years of education: Not on file   • Highest education level: Not on file   Occupational History   • Occupation: Office     Employer: RETIRED   Tobacco Use   • Smoking status: Never Smoker   • Smokeless tobacco: Never Used   Substance and Sexual Activity   • Alcohol use: No   • Drug use: No   • Sexual activity: Defer   Social History Narrative           FAMILY HISTORY  Family History   Problem Relation Age of Onset   • Colon cancer Mother    • Cancer Mother    • Coronary artery disease Father    • Heart attack Father    • Heart disease Father    • Sleep apnea Brother

## 2024-04-24 ENCOUNTER — HOSPITAL ENCOUNTER (OUTPATIENT)
Dept: CT IMAGING | Facility: HOSPITAL | Age: 89
Discharge: HOME | End: 2024-04-24
Admitting: INTERNAL MEDICINE
Payer: MEDICARE

## 2024-04-24 DIAGNOSIS — E27.8 ADRENAL NODULE: ICD-10-CM

## 2024-04-24 DIAGNOSIS — N63.20 MASS OF LEFT BREAST, UNSPECIFIED QUADRANT: ICD-10-CM

## 2024-04-24 DIAGNOSIS — R91.1 LUNG NODULE: ICD-10-CM

## 2024-04-24 DIAGNOSIS — C50.919 MALIGNANT NEOPLASM OF FEMALE BREAST, UNSPECIFIED ESTROGEN RECEPTOR STATUS, UNSPECIFIED LATERALITY, UNSPECIFIED SITE OF BREAST: ICD-10-CM

## 2024-04-24 DIAGNOSIS — R93.89 ABNORMAL CT SCAN, CHEST: ICD-10-CM

## 2024-04-24 PROCEDURE — 71250 CT THORAX DX C-: CPT

## 2024-04-24 PROCEDURE — 74176 CT ABD & PELVIS W/O CONTRAST: CPT

## 2024-05-01 ENCOUNTER — OFFICE VISIT (OUTPATIENT)
Dept: ONCOLOGY | Facility: CLINIC | Age: 89
End: 2024-05-01
Payer: MEDICARE

## 2024-05-01 ENCOUNTER — LAB (OUTPATIENT)
Dept: OTHER | Facility: HOSPITAL | Age: 89
End: 2024-05-01
Payer: MEDICARE

## 2024-05-01 VITALS
WEIGHT: 130.2 LBS | RESPIRATION RATE: 16 BRPM | DIASTOLIC BLOOD PRESSURE: 88 MMHG | HEART RATE: 87 BPM | TEMPERATURE: 97.8 F | HEIGHT: 68 IN | OXYGEN SATURATION: 94 % | SYSTOLIC BLOOD PRESSURE: 146 MMHG | BODY MASS INDEX: 19.73 KG/M2

## 2024-05-01 DIAGNOSIS — Z17.0 MALIGNANT NEOPLASM OF UPPER-OUTER QUADRANT OF LEFT BREAST IN FEMALE, ESTROGEN RECEPTOR POSITIVE: Primary | ICD-10-CM

## 2024-05-01 DIAGNOSIS — C50.412 MALIGNANT NEOPLASM OF UPPER-OUTER QUADRANT OF LEFT BREAST IN FEMALE, ESTROGEN RECEPTOR POSITIVE: Primary | ICD-10-CM

## 2024-05-01 DIAGNOSIS — C50.919 MALIGNANT NEOPLASM OF FEMALE BREAST, UNSPECIFIED ESTROGEN RECEPTOR STATUS, UNSPECIFIED LATERALITY, UNSPECIFIED SITE OF BREAST: ICD-10-CM

## 2024-05-01 DIAGNOSIS — Z12.31 SCREENING MAMMOGRAM FOR BREAST CANCER: ICD-10-CM

## 2024-05-01 LAB
ALBUMIN SERPL-MCNC: 4 G/DL (ref 3.5–5.2)
ALBUMIN/GLOB SERPL: 1.3 G/DL
ALP SERPL-CCNC: 88 U/L (ref 39–117)
ALT SERPL W P-5'-P-CCNC: 15 U/L (ref 1–33)
ANION GAP SERPL CALCULATED.3IONS-SCNC: 6.4 MMOL/L (ref 5–15)
AST SERPL-CCNC: 26 U/L (ref 1–32)
BASOPHILS # BLD AUTO: 0.09 10*3/MM3 (ref 0–0.2)
BASOPHILS NFR BLD AUTO: 1.4 % (ref 0–1.5)
BILIRUB SERPL-MCNC: 0.9 MG/DL (ref 0–1.2)
BUN SERPL-MCNC: 13 MG/DL (ref 8–23)
BUN/CREAT SERPL: 19.7 (ref 7–25)
CALCIUM SPEC-SCNC: 9.3 MG/DL (ref 8.2–9.6)
CHLORIDE SERPL-SCNC: 97 MMOL/L (ref 98–107)
CO2 SERPL-SCNC: 33.6 MMOL/L (ref 22–29)
CREAT SERPL-MCNC: 0.66 MG/DL (ref 0.57–1)
DEPRECATED RDW RBC AUTO: 56.3 FL (ref 37–54)
EGFRCR SERPLBLD CKD-EPI 2021: 82.4 ML/MIN/1.73
EOSINOPHIL # BLD AUTO: 0.12 10*3/MM3 (ref 0–0.4)
EOSINOPHIL NFR BLD AUTO: 1.8 % (ref 0.3–6.2)
ERYTHROCYTE [DISTWIDTH] IN BLOOD BY AUTOMATED COUNT: 15.9 % (ref 12.3–15.4)
GLOBULIN UR ELPH-MCNC: 3.2 GM/DL
GLUCOSE SERPL-MCNC: 93 MG/DL (ref 65–99)
HCT VFR BLD AUTO: 37 % (ref 34–46.6)
HGB BLD-MCNC: 12.1 G/DL (ref 12–15.9)
IMM GRANULOCYTES # BLD AUTO: 0.01 10*3/MM3 (ref 0–0.05)
IMM GRANULOCYTES NFR BLD AUTO: 0.2 % (ref 0–0.5)
LYMPHOCYTES # BLD AUTO: 1.45 10*3/MM3 (ref 0.7–3.1)
LYMPHOCYTES NFR BLD AUTO: 22.3 % (ref 19.6–45.3)
MCH RBC QN AUTO: 31.8 PG (ref 26.6–33)
MCHC RBC AUTO-ENTMCNC: 32.7 G/DL (ref 31.5–35.7)
MCV RBC AUTO: 97.4 FL (ref 79–97)
MONOCYTES # BLD AUTO: 0.91 10*3/MM3 (ref 0.1–0.9)
MONOCYTES NFR BLD AUTO: 14 % (ref 5–12)
NEUTROPHILS NFR BLD AUTO: 3.91 10*3/MM3 (ref 1.7–7)
NEUTROPHILS NFR BLD AUTO: 60.3 % (ref 42.7–76)
NRBC BLD AUTO-RTO: 0 /100 WBC (ref 0–0.2)
PLATELET # BLD AUTO: 161 10*3/MM3 (ref 140–450)
PMV BLD AUTO: 11.1 FL (ref 6–12)
POTASSIUM SERPL-SCNC: 4 MMOL/L (ref 3.5–5.2)
PROT SERPL-MCNC: 7.2 G/DL (ref 6–8.5)
RBC # BLD AUTO: 3.8 10*6/MM3 (ref 3.77–5.28)
SODIUM SERPL-SCNC: 137 MMOL/L (ref 136–145)
WBC NRBC COR # BLD AUTO: 6.49 10*3/MM3 (ref 3.4–10.8)

## 2024-05-01 PROCEDURE — 85025 COMPLETE CBC W/AUTO DIFF WBC: CPT | Performed by: INTERNAL MEDICINE

## 2024-05-01 PROCEDURE — 36415 COLL VENOUS BLD VENIPUNCTURE: CPT

## 2024-05-01 PROCEDURE — 80053 COMPREHEN METABOLIC PANEL: CPT | Performed by: INTERNAL MEDICINE

## 2024-05-01 RX ORDER — LORATADINE 10 MG/1
1 TABLET ORAL DAILY PRN
COMMUNITY
Start: 2024-02-23 | End: 2025-02-22

## 2024-05-01 RX ORDER — ESCITALOPRAM OXALATE 10 MG/1
10 TABLET ORAL DAILY
COMMUNITY
Start: 2024-02-22 | End: 2025-02-21

## 2024-05-01 RX ORDER — CHLORHEXIDINE GLUCONATE ORAL RINSE 1.2 MG/ML
15 SOLUTION DENTAL
COMMUNITY
Start: 2024-02-22

## 2024-05-01 NOTE — PROGRESS NOTES
Subjective .     REASONS FOR FOLLOWUP:    [id number] invasive ductal carcinoma in the left breast. The mammogram showed the lesion to be 1.3 x 1.7 cm. She had a digital mammogram that showed a 1.8 cm mass in the left breast at 6 o'clock position. The biopsy was consistent wi t h invasive ductal carcinoma, grade 2, [place] score of 6 out of 9, ER positive greater than 90%, MA positive greater than 90%, HER-2/shannon 2+ by immunohistochemistry but negative by FISH. She is status post lumpectomy which showed a 1.3 x 1.7 cm mass, gr a de 2, [place] score of 6 out of 9 and there was a component of DCIS. A second focus of DCIS was present along the superior margin away from the actual mass and the superior margin was involved. As a result she underwent re-excision and this was negati ve. She had 3 lymph nodes removed and there was macrometastasis in 1 lymph node which was about 0.4 cm. Currently the patient refuses chemotherapy and is going to be started on hormonal treatment with Arimidex.            History of Present Illness     Ms. Quinn is a 89 y.o. who returns to the office for delayed follow-up in regards to her breast cancer.  He was last seen by Dr. Lyons March 2, 2023.  She is accompanied by her daughter today.  Her follow-up is delayed because of COVID and previous issues with low back pain.  Reports she did receive a total of 3 epidurals.     She is up-to-date on her mammogram.  Her her mammogram from 8/16/2023 was negative that any signs of malignancy.  She continues to eat and drink well and remain as active as possible.  She has had at least a 20 pound weight loss since she was last seen that was unintentional.  She does report fatigue and occasional shortness of breath with exertion. Daughter was wondering if patient could get a prescription for oxygen. No other concerns noted at this time.      October 25, 2023: Patient is doing well without any complaints.  Her daughter states that she has been  losing weight.  She had undergone a CT scan of the chest abdomen pelvis and is negative except small left breast nodule 0.6 x 1.2 cm seen on the CT scan.  Her screening mammogram was negative but we will obtain a diagnostic mammogram of the left.  She also had an adrenal nodule 1.4 x 2.4 cm.  Repeat CT was suggested 6 months.  Can October 17, 2023 and is negative    May 1, 2024: Patient underwent screening mammogram August 16, 2023.  No evidence of malignancy and no significant change.  Patient had CT scan of the chest abdomen pelvis and has tiny pulmonary nodules indeterminate consider 3 to 6-month follow-up she had a left breast nodule measuring 0.6 to 1.2 cm but no enlarged supraclavicular or mediastinal nodes.  There was a left adrenal nodule 1.4 x 2.4 cm.  But that has been stable.  Given there was a left chest wall nodule they wanted to correlate with mammography.  Patient underwent diagnostic left breast mammogram on November 21, 2023 which showed that on ultrasound of the left breast was performed from 2:00 to 5 o'clock position in the region of the abnormality seen on mammogram and CT scan and dense fibroglandular tissue was identified.  No definite sonographic correlate was seen no suspicious cystic or solid masses were identified and no definite sonographic correlate is identified to the mammographic focal asymmetry.    Patient underwent repeat CT chest abdomen pelvis which shows new right lower lobe mixed nodule and new groundglass opacity in the left lung is new scattered groundglass and solid lung nodules which are nonspecific additional CT and 3 scan is recommended but my concern is 2.2 cm nodular structure in the left upper quadrant may be arising from the left adrenal gland and it is difficult to distinguish from adjacent blood vessels    Patient is totally asymptomatic and refuses to have any further CT scans.    PAST MEDICAL HISTORY:   Significant for high cholesterol.   Hyperthyroidism.    Depression.   History of hypertension.     PAST SURGICAL HISTORY: Three C-sections.     OB/GYN HISTORY: She started menstrual periods at age 14. She attained menopause around 50. She is  3, para 3, no miscarriages. She was 23 years old when she had her first child and 29 when she had the last one. Her children are 59, 58 and 54 years old. Two girls and 1 boy.     ONCOLOGIC HISTORY      ONCOLOGIC HISTORY: The patient is an 82-year-old female who likes to be called Tati who has noticed a bump in the left breast. As a result, she went to her primary care physician and mammogram was ordered. On 2014 she underwent mammogram at [facility] and the mammogram showed evidence of a 1.3 x 0.7 x 0.9 cm irregular hypoechoic mass that extends into the skin and in the left breast at the 7 o'clock position 11 cm from the nipple. As a result a biopsy was recommended. The mammogra m on the right was negative. She underwent a bilateral digital mammogram which showed that she had scattered fibroglandular densities throughout both breasts within the posterior one-third of the left breast at the 6 o'clock position . At the inframammary fold there was an irregular mass that measured 1.8 cm in greatest dimension with spiculation and architectural distortion noted. No abnormality was seen on the right breast. Mildly prominent left axillary lymph node was noted. Ultrasound was done which showed that there was a 1.3 x 0.7 x 0.9 cm irregular mass that extended into the skin. As a result, the patient underwent ultrasound-guided biopsy on 2014. The pathology, accession #[id number]. 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 The histologic grade was 2, [place] score was 6 out of 9 and the maximal span of invasive carcinoma in the core was 8 mm. The estrogen receptors were done and was greater than 90%, progestero n e receptor was  90%, HER-2 by immunohistochemistry was 2+ but by FISH HER/2 ratio was 1.4 which is negative. Subsequently, the patient was referred to Dr. Bagley and MRI of the breast was performed on 05/02/2014 and MRI showed that within the left breas t at 6 o'clock position in the posterior one-third near the inframammary fold there was an irregular enhancing mass with internal metallic clip. There was linear enhancement extending anteriorly away from the mass. The mass itself measured 2 cm in the sup e rior to inferior dimension, 2 cm in anterior to posterior dimension and 1.9 cm in the medial to lateral dimension and extending anteriorly away from the mass and contiguous with the mass is a linear clump enhancement that measured about 2.5 cm in the ant e rior posterior dimension. This brings the greatest dimension of the mass and adjacent near clump enhancement to be a total of 4.4 cm. There were no other areas that were suspicious. The right breast was negative. As a result the patient underwent surger y . She underwent left breast lumpectomy with sentinel lymph node sampling on 05/22/2014. The pathology accession number is [id number]. Pathology is consistent with invasive ductal carcinoma intermediate grade, [place] score 6, tumor size 1.5 x 1.3 x 1.2 c m. 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 Intermediate nuclear grade minor component margins were focally involved by DCIS of the superior margin. There is a focus of intermediate grade DCIS located approximately 1.3 cm from the invasive carcinoma, which involves the superior margin and distance of invasive carcinoma from the closest margin was 1 mm superiorly and distance of in situ carcinoma from the closest margins was involved superiorly. There were 3 lymph nodes removed. There was micrometast a sis in 1 lymph node and the size of the micrometastasis was 0.4 cm and it is a [id number] invasive ductal carcinoma. There was tumor invasion of dermis present. The patient  then had to undergo a second surgery on 2014 because of positive margin. She u nderwent re-excision of the superior margin of the left breast. The pathology accession number is [id number] and the patient underwent a left breast lumpectomy re-excision. Focal non-atypical ductal hyperplasia, extensive fat necrosis with fibrosis and mix e d inflammation, no atypia. The patient then was referred here for the evaluation of options of treatment. She has already seen Dr. Pat Sage yesterday with plans of starting radiation on Monday. She has healed up from the surgery. She is here to disc uss options of further treatment from us. Currently she has no complaints except for a cough, which is chronic, which is likely secondary to allergies as the cough gets worse during the allergy season.    Patient has been on Arimidex since 2014 with olive ns to continue a total of 5 years.    Radiation between 2014 to 2014.    The 2015 mammogram is benign. We will continue Arimidex.    2016 mammogram negative.  Screening mammogram 2018-  [place] mammogram 2020 negative    MEDICATIONS: The current medication list was reviewed with the patient and updates in the EMR this date per the medical assistant. Medication dosages and frequencies were confirmed to be accurate.     ALLERGIES: Not allergic to any medications.     SOCIAL HISTORY: She is  and lives with her . She is very much into gardening and reading. She does not smoke. She does not drink alcohol. She has no history of any previous blood transfusions  .   FAMILY HISTORY: Father  of a heart attack at 69. Mother had colon cancer and  of colon cancer at 76. She has 1 brother who is 77 and in good health. There is no other family history of breast cancer.       Current Outpatient Medications on File Prior to Visit   Medication Sig Dispense Refill    alendronate (FOSAMAX) 35 MG tablet TAKE 1 TABLET EVERY 7 (SEVEN) DAYS. 12  tablet 3    atorvastatin (LIPITOR) 10 MG tablet TAKE 1 TABLET EVERY DAY 90 tablet 0    Calcium Carb-Cholecalciferol 1000-800 MG-UNIT tablet Take  by mouth.      chlorhexidine (PERIDEX) 0.12 % solution Apply 15 mL to the mouth or throat.      escitalopram (LEXAPRO) 10 MG tablet Take 1 tablet by mouth Daily.      furosemide (LASIX) 20 MG tablet       hydroCHLOROthiazide (HYDRODIURIL) 25 MG tablet TAKE 1 TABLET EVERY DAY 90 tablet 0    HYDROcodone-acetaminophen (NORCO) 5-325 MG per tablet       levothyroxine (SYNTHROID, LEVOTHROID) 75 MCG tablet Take 1 tablet by mouth Daily. 90 tablet 0    lidocaine (LIDODERM) 5 % Place 1 patch on the skin as directed by provider Daily.      loratadine (CLARITIN) 10 MG tablet Take 1 tablet by mouth Daily As Needed.      metoprolol tartrate (LOPRESSOR) 25 MG tablet Take 1 tablet by mouth 2 (Two) Times a Day. 60 tablet 11    Multiple Vitamins-Minerals (MULTIVITAMIN ADULT PO) Take  by mouth.      potassium chloride ER (K-TAB) 20 MEQ tablet controlled-release ER tablet Take 1 tablet by mouth 2 (Two) Times a Day. 180 tablet 1    sulfamethoxazole-trimethoprim (BACTRIM DS,SEPTRA DS) 800-160 MG per tablet Take 1 tablet by mouth 2 (Two) Times a Day. 14 tablet 0    Xarelto 20 MG tablet TAKE 1 TABLET EVERY DAY 90 tablet 3     No current facility-administered medications on file prior to visit.       Review of Systems   Constitutional:  Positive for fatigue (Unchanged). Negative for activity change, appetite change, chills, diaphoresis, fever and unexpected weight change.   HENT:  Positive for hearing loss (Unchanged). Negative for mouth sores, nosebleeds, sore throat and trouble swallowing.    Respiratory:  Negative for cough, chest tightness, shortness of breath and wheezing.    Cardiovascular:  Negative for chest pain, palpitations and leg swelling.   Gastrointestinal:  Negative for abdominal distention, abdominal pain, constipation, diarrhea, nausea and vomiting.   Genitourinary:  Negative for  "difficulty urinating, dysuria, frequency, hematuria and urgency.   Musculoskeletal:  Positive for arthralgias (Unchanged) and myalgias (Unchanged). Negative for joint swelling.        No muscle weakness.   Skin:  Negative for rash and wound.   Neurological:  Negative for dizziness, seizures, syncope, speech difficulty, weakness, numbness and headaches.   Hematological:  Negative for adenopathy. Does not bruise/bleed easily.   Psychiatric/Behavioral:  Negative for behavioral problems, confusion, sleep disturbance and suicidal ideas.    All other systems reviewed and are negative.  I have reviewed and confirmed the accuracy of the ROS as documented by the MA/LPN/RN Marla Lyons MD  Patient complains of pain in the left upper quadrant mild, intermittent currently not present      Objective      Vitals:    05/01/24 1416   BP: 146/88   Pulse: 87   Resp: 16   Temp: 97.8 °F (36.6 °C)   TempSrc: Temporal   SpO2: 94%   Weight: 59.1 kg (130 lb 3.2 oz)   Height: 172 cm (67.72\")   PainSc: 0-No pain     Walking O2 performed with patient.  Oxygen saturation never dropped below 95% on room air.  Patient was walking briskly and talking the entire time.  Heart rate remained steady between 70 and 80.  Patient does not require any supplemental oxygen at this time.        5/1/2024     2:16 PM   Current Status   ECOG score 0         Physical Exam   Constitutional: She is oriented to person, place, and time. She appears well-developed. No distress.   HENT:   Head: Normocephalic and atraumatic.   Mouth/Throat: No oropharyngeal exudate.   Eyes: Pupils are equal, round, and reactive to light.   Cardiovascular: Normal rate and normal heart sounds. An irregularly irregular rhythm present.   No murmur heard.  Pulmonary/Chest: Effort normal and breath sounds normal. No respiratory distress. She has no wheezes. She has no rhonchi. She has no rales.   Abdominal: Soft. Normal appearance and bowel sounds are normal. She exhibits no distension. "   Musculoskeletal: Normal range of motion.   Neurological: She is alert and oriented to person, place, and time.   Skin: Skin is warm and dry. No rash noted.   Vitals reviewed.   I have reviewed and confirmed the accuracy of the ROS as documented by the MA/LPN/RN Marla Lyons MD        RECENT LABS:  Hematology WBC   Date Value Ref Range Status   05/01/2024 6.49 3.40 - 10.80 10*3/mm3 Final   09/21/2023 4.71 4.5 - 11.0 10*3/uL Final     RBC   Date Value Ref Range Status   05/01/2024 3.80 3.77 - 5.28 10*6/mm3 Final   09/21/2023 3.84 (L) 4.0 - 5.2 10*6/uL Final     Hemoglobin   Date Value Ref Range Status   05/01/2024 12.1 12.0 - 15.9 g/dL Final   09/21/2023 13.4 12.0 - 16.0 g/dL Final     Hematocrit   Date Value Ref Range Status   05/01/2024 37.0 34.0 - 46.6 % Final   09/21/2023 39.2 36.0 - 46.0 % Final     Platelets   Date Value Ref Range Status   05/01/2024 161 140 - 450 10*3/mm3 Final   09/21/2023 169 140 - 440 10*3/uL Final        Assessment & Plan      1. This is a patient with history of [id number] invasive ductal carcinoma of the left breast status post surgery 2014, status post radiation,   Started Arimidex July 2014.    Patient complains of issues with weakness in her lower extremities.  After reviewing discussion with Dr. Lyons it was recommended that the patient go ahead and discontinue Arimidex as she has completed over 6 years.  We recommend she discontinue due to side effects as well as osteoporosis.  This was given to patient in written form as well.   Reviewed last mammogram from August 27, 2020 which is negative  9/20/2023: Mammogram from 8/16/2023 reviewed and negative.  Additional labs added today for patient's reports of fatigue. Ferritin, iron profile, B12, folate, and thyroid labs all remain normal.  CBC and CMP are normal.  Patient was reviewed with Dr. Lyons.  We will go ahead and have patient get a whole-body bone scan and CT of chest abdomen and pelvis to be completed before follow-up  with Dr. Lyons in 4 weeks.  Concerned since she has had 20 pound weight loss that there may be potential disease recurrence.  Tober 25th 2023: Patient is not on any endocrine therapy and followed with observation.  With Arimidex it was started in July 20 14.  And since she had completed 6 years and she had arthralgias it was discontinued after 6 years  I reviewed the results of the CT scan as well as bone scan it is negative except left breast nodule and left adrenal gland which is 2.4 cm and a repeat CT suggested  Will need to obtain left diagnostic mammogram and pina  Reviewed left diagnostic mammogram and ultrasound from November 21, 2023 which shows no evidence of malignancy in the left breast and annual screening recommended in August 2024.  CT scans done April 2024 shows 1 tiny lung nodules and patient refuses any further CT scans given her age and she is asymptomatic    2.  Osteoporosis: Seen on DEXA scan 8/3/2020.  She is taking calcium, vitamin D and fosomax.    3.  HX A-fib: On Xarelto, able  9/20/2023: Patient does remain on Xarelto and remains compliant.  She denies any signs of bleeding or excessive bruising.    4.  History of colon polyps in 2016 followed by Dr. Luke Linton.  Denies any active GI bleeding.  Patient was to have repeat colonoscopy but has not had one .     5.  Pain in the left upper quadrant, mild, intermittent.  Given persistence of pain intermittently for a year we discussed about obtaining CT but patient currently reluctant.  She is to call us if the pain worsens    Plan  Additional labs added and reviewed: Iron profile, ferritin, folate, B12, TSH, and T4   Reviewed mammogram done recently which is negative  Reviewed CT scan and bone scan there is no distant metastasis except tiny nodule in the left adrenal gland  Repeat CT suggested in 3 months given the present CT scan shows some tiny abnormality 3 mm in the lung nodules but patient refuses any further CT scans  Also will  obtain left diagnostic mammogram and ultrasound  Follow-up in 6 months with me but we will catch up the results of the mammogram and ultrasound on the phone patient currently is off Arimidex 6 years of treatment  Follow-up with me in 6 months with labs    I spent 35 total minutes, face-to-face, caring for Angela today. Greater than 50% of this time involved counseling and/or coordination of care as documented within this note.    Marla Lyons MD  Reviewed with Dr. Lyons today who is in agreement with plan of care.

## 2024-08-19 ENCOUNTER — HOSPITAL ENCOUNTER (OUTPATIENT)
Dept: MAMMOGRAPHY | Facility: HOSPITAL | Age: 89
Discharge: HOME | End: 2024-08-19
Admitting: INTERNAL MEDICINE
Payer: MEDICARE

## 2024-08-19 DIAGNOSIS — C50.412 MALIGNANT NEOPLASM OF UPPER-OUTER QUADRANT OF LEFT BREAST IN FEMALE, ESTROGEN RECEPTOR POSITIVE: ICD-10-CM

## 2024-08-19 DIAGNOSIS — Z12.31 SCREENING MAMMOGRAM FOR BREAST CANCER: ICD-10-CM

## 2024-08-19 DIAGNOSIS — Z17.0 MALIGNANT NEOPLASM OF UPPER-OUTER QUADRANT OF LEFT BREAST IN FEMALE, ESTROGEN RECEPTOR POSITIVE: ICD-10-CM

## 2024-08-19 PROCEDURE — 77067 SCR MAMMO BI INCL CAD: CPT

## 2024-08-19 PROCEDURE — 77063 BREAST TOMOSYNTHESIS BI: CPT

## 2024-10-28 RX ORDER — RIVAROXABAN 20 MG/1
TABLET, FILM COATED ORAL
Qty: 90 TABLET | Refills: 3 | OUTPATIENT
Start: 2024-10-28

## 2024-10-30 ENCOUNTER — TELEPHONE (OUTPATIENT)
Dept: ONCOLOGY | Facility: CLINIC | Age: 89
End: 2024-10-30
Payer: MEDICARE

## 2024-10-30 NOTE — TELEPHONE ENCOUNTER
Caller: TOVA SIMONS ( [place] )    Relationship to patient DAUGHTER    Best call back number: [phone]     Chief complaint: PT NEEDS TO R/S     Type of visit: LAB AND FOLLOW UP     Requested date: PLEASE CALL THE PT'S DAUGHTER BACK TO R/S SO THEY CAN LOOK AT THEIR SCHEDULE.      PLEASE CALL BACK TOMORROW 10/31 TO GO OVER THE SCHEDULE AVAILABLE PLEASE CALL AFTER 12 PM.    If rescheduling, when is the original appointment: 10/23/24

## 2024-11-07 NOTE — PROGRESS NOTES
"Chief Complaint   Patient presents with    Follow-up     \"Lack of energy\"     11/08/2024, interval history:  Patient new to me.  Previous Dr. Lyons patient.  She is here accompanied by her daughter.  She had her mammogram in August 2024.  Doing well otherwise.  No issues or concerns    ONCOLOGIC HISTORY   ONCOLOGIC HISTORY: The patient is an 82-year-old female who likes to be called Tati who has noticed a bump in the left breast. As a result, she went to her primary care physician and mammogram was ordered. On 04/11/2014 she underwent mammogram at [facility] and the mammogram showed evidence of a 1.3 x 0.7 x 0.9 cm irregular hypoechoic mass that extends into the skin and in the left breast at the 7 o'clock position 11 cm from the nipple. As a result a biopsy was recommended. The mammogra m on the right was negative. She underwent a bilateral digital mammogram which showed that she had scattered fibroglandular densities throughout both breasts within the posterior one-third of the left breast at the 6 o'clock position . At the inframammary fold there was an irregular mass that measured 1.8 cm in greatest dimension with spiculation and architectural distortion noted. No abnormality was seen on the right breast. Mildly prominent left axillary lymph node was noted. Ultrasound was done which showed that there was a 1.3 x 0.7 x 0.9 cm irregular mass that extended into the skin. As a result, the patient underwent ultrasound-guided biopsy on 04/16/2014. The pathology, accession #[id number]. 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 The histologic grade was 2, [place] score was 6 out of 9 and the maximal span of invasive carcinoma in the core was 8 mm. The estrogen receptors were done and was greater than 90%, progestero n e receptor was 90%, HER-2 by immunohistochemistry was 2+ but by FISH HER/2 ratio was 1.4 which is " negative. Subsequently, the patient was referred to Dr. Bagley and MRI of the breast was performed on 05/02/2014 and MRI showed that within the left breas t at 6 o'clock position in the posterior one-third near the inframammary fold there was an irregular enhancing mass with internal metallic clip. There was linear enhancement extending anteriorly away from the mass. The mass itself measured 2 cm in the sup e rior to inferior dimension, 2 cm in anterior to posterior dimension and 1.9 cm in the medial to lateral dimension and extending anteriorly away from the mass and contiguous with the mass is a linear clump enhancement that measured about 2.5 cm in the ant e rior posterior dimension. This brings the greatest dimension of the mass and adjacent near clump enhancement to be a total of 4.4 cm. There were no other areas that were suspicious. The right breast was negative. As a result the patient underwent surger y . She underwent left breast lumpectomy with sentinel lymph node sampling on 05/22/2014. The pathology accession number is [id number]. Pathology is consistent with invasive ductal carcinoma intermediate grade, [place] score 6, tumor size 1.5 x 1.3 x 1.2 c m. 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 Intermediate nuclear grade minor component margins were focally involved by DCIS of the superior margin. There is a focus of intermediate grade DCIS located approximately 1.3 cm from the invasive carcinoma, which involves the superior margin and distance of invasive carcinoma from the closest margin was 1 mm superiorly and distance of in situ carcinoma from the closest margins was involved superiorly. There were 3 lymph nodes removed. There was micrometast a sis in 1 lymph node and the size of the micrometastasis was 0.4 cm and it is a [id number] invasive ductal carcinoma. There was tumor invasion of dermis present. The patient then had to undergo a second surgery on 06/11/2014 because of positive margin. She u  nderwent re-excision of the superior margin of the left breast. The pathology accession number is [id number] and the patient underwent a left breast lumpectomy re-excision. Focal non-atypical ductal hyperplasia, extensive fat necrosis with fibrosis and mix e d inflammation, no atypia. The patient then was referred here for the evaluation of options of treatment. She has already seen Dr. Pat Sage yesterday with plans of starting radiation on Monday. She has healed up from the surgery. She is here to disc uss options of further treatment from us. Currently she has no complaints except for a cough, which is chronic, which is likely secondary to allergies as the cough gets worse during the allergy season.    Patient has been on Arimidex since July 2014 with olive ns to continue a total of 5 years.    Radiation between 07/23/2014 to 09/08/2014.    The 05/06/2015 mammogram is benign. We will continue Arimidex.    5/2016 mammogram negative.  Screening mammogram November 2018-  [place] mammogram 8/27/2020 negative      Current Outpatient Medications on File Prior to Visit   Medication Sig Dispense Refill    alendronate (FOSAMAX) 35 MG tablet TAKE 1 TABLET EVERY 7 (SEVEN) DAYS. 12 tablet 3    atorvastatin (LIPITOR) 10 MG tablet TAKE 1 TABLET EVERY DAY 90 tablet 0    Calcium Carb-Cholecalciferol 1000-800 MG-UNIT tablet Take  by mouth.      chlorhexidine (PERIDEX) 0.12 % solution Apply 15 mL to the mouth or throat.      escitalopram (LEXAPRO) 10 MG tablet Take 1 tablet by mouth Daily.      furosemide (LASIX) 20 MG tablet       hydroCHLOROthiazide (HYDRODIURIL) 25 MG tablet TAKE 1 TABLET EVERY DAY 90 tablet 0    HYDROcodone-acetaminophen (NORCO) 5-325 MG per tablet       levothyroxine (SYNTHROID, LEVOTHROID) 88 MCG tablet Take 1 tablet by mouth Daily.      lidocaine (LIDODERM) 5 % Place 1 patch on the skin as directed by provider Daily.      loratadine (CLARITIN) 10 MG tablet Take 1 tablet by mouth Daily As Needed.       "metoprolol tartrate (LOPRESSOR) 25 MG tablet Take 1 tablet by mouth 2 (Two) Times a Day. 60 tablet 11    Multiple Vitamins-Minerals (MULTIVITAMIN ADULT PO) Take  by mouth.      potassium chloride ER (K-TAB) 20 MEQ tablet controlled-release ER tablet Take 1 tablet by mouth 2 (Two) Times a Day. 180 tablet 1    sulfamethoxazole-trimethoprim (BACTRIM DS,SEPTRA DS) 800-160 MG per tablet Take 1 tablet by mouth 2 (Two) Times a Day. 14 tablet 0    Xarelto 20 MG tablet TAKE 1 TABLET EVERY DAY 90 tablet 3    levothyroxine (SYNTHROID, LEVOTHROID) 75 MCG tablet Take 1 tablet by mouth Daily. (Patient not taking: Reported on 11/8/2024) 90 tablet 0     No current facility-administered medications on file prior to visit.     No Known Allergies      I have reviewed Past Medical, Surgical History, Social History, Meds and Allergies.     REVIEW OF SYSTEMS:  See interval History      Objective      Vitals:    11/08/24 1243   BP: 142/79   Pulse: 67   Resp: 16   Temp: 98 °F (36.7 °C)   TempSrc: Oral   SpO2: 99%   Weight: 59.2 kg (130 lb 8 oz)   Height: 172 cm (67.72\")   PainSc: 0-No pain             11/8/2024    12:49 PM   Current Status   ECOG score 0       Physical Exam      RECENT LABS:  Hematology WBC   Date Value Ref Range Status   11/08/2024 5.03 3.40 - 10.80 10*3/mm3 Final   07/22/2024 4.69 4.5 - 11.0 10*3/uL Final     RBC   Date Value Ref Range Status   11/08/2024 3.77 3.77 - 5.28 10*6/mm3 Final   07/22/2024 3.74 (L) 4.0 - 5.2 10*6/uL Final     Hemoglobin   Date Value Ref Range Status   11/08/2024 12.1 12.0 - 15.9 g/dL Final   07/22/2024 12.2 12.0 - 16.0 g/dL Final     Hematocrit   Date Value Ref Range Status   11/08/2024 38.0 34.0 - 46.6 % Final   07/22/2024 37.6 36.0 - 46.0 % Final     Platelets   Date Value Ref Range Status   11/08/2024 185 140 - 450 10*3/mm3 Final   07/22/2024 183 140 - 440 10*3/uL Final        Assessment & Plan    *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  CT scans done April 2024 shows 1 tiny lung nodules and patient refuses any further CT scans given her age and she is asymptomatic  August 2024 screening [id number]  October 2024: Patient establish care with me.  Previous Dr. Lyons patient.       *Osteoporosis  Seen on DEXA scan 8/3/2020.  She is taking calcium, vitamin D and fosomax, managed by PCP.      Plan  Reviewed mammogram from August 2024  Due for annual mammogram in August 2025  Follow-up in 1 year  with CBC and mammogram    Patient new to me.  All issues new to me  I spent 35 total minutes, face-to-face, caring for Angela today. Greater than 50% of this time involved counseling and/or coordination of care as documented within this note.

## 2024-11-08 ENCOUNTER — LAB (OUTPATIENT)
Dept: OTHER | Facility: HOSPITAL | Age: 89
End: 2024-11-08
Payer: MEDICARE

## 2024-11-08 ENCOUNTER — OFFICE VISIT (OUTPATIENT)
Dept: ONCOLOGY | Facility: CLINIC | Age: 89
End: 2024-11-08
Payer: MEDICARE

## 2024-11-08 VITALS
RESPIRATION RATE: 16 BRPM | TEMPERATURE: 98 F | DIASTOLIC BLOOD PRESSURE: 79 MMHG | SYSTOLIC BLOOD PRESSURE: 142 MMHG | HEART RATE: 67 BPM | OXYGEN SATURATION: 99 % | HEIGHT: 68 IN | WEIGHT: 130.5 LBS | BODY MASS INDEX: 19.78 KG/M2

## 2024-11-08 DIAGNOSIS — Z12.31 SCREENING MAMMOGRAM FOR BREAST CANCER: Primary | ICD-10-CM

## 2024-11-08 DIAGNOSIS — C50.412 MALIGNANT NEOPLASM OF UPPER-OUTER QUADRANT OF LEFT BREAST IN FEMALE, ESTROGEN RECEPTOR POSITIVE: ICD-10-CM

## 2024-11-08 DIAGNOSIS — Z17.0 MALIGNANT NEOPLASM OF UPPER-OUTER QUADRANT OF LEFT BREAST IN FEMALE, ESTROGEN RECEPTOR POSITIVE: ICD-10-CM

## 2024-11-08 LAB
ALBUMIN SERPL-MCNC: 4.1 G/DL (ref 3.5–5.2)
ALBUMIN/GLOB SERPL: 1.2 G/DL
ALP SERPL-CCNC: 110 U/L (ref 39–117)
ALT SERPL W P-5'-P-CCNC: 16 U/L (ref 1–33)
ANION GAP SERPL CALCULATED.3IONS-SCNC: 6.6 MMOL/L (ref 5–15)
AST SERPL-CCNC: 27 U/L (ref 1–32)
BASOPHILS # BLD AUTO: 0.09 10*3/MM3 (ref 0–0.2)
BASOPHILS NFR BLD AUTO: 1.8 % (ref 0–1.5)
BILIRUB SERPL-MCNC: 1 MG/DL (ref 0–1.2)
BUN SERPL-MCNC: 11 MG/DL (ref 8–23)
BUN/CREAT SERPL: 16.9 (ref 7–25)
CALCIUM SPEC-SCNC: 9.2 MG/DL (ref 8.2–9.6)
CHLORIDE SERPL-SCNC: 99 MMOL/L (ref 98–107)
CO2 SERPL-SCNC: 35.4 MMOL/L (ref 22–29)
CREAT SERPL-MCNC: 0.65 MG/DL (ref 0.57–1)
DEPRECATED RDW RBC AUTO: 55.6 FL (ref 37–54)
EGFRCR SERPLBLD CKD-EPI 2021: 82.7 ML/MIN/1.73
EOSINOPHIL # BLD AUTO: 0.18 10*3/MM3 (ref 0–0.4)
EOSINOPHIL NFR BLD AUTO: 3.6 % (ref 0.3–6.2)
ERYTHROCYTE [DISTWIDTH] IN BLOOD BY AUTOMATED COUNT: 15.3 % (ref 12.3–15.4)
GLOBULIN UR ELPH-MCNC: 3.4 GM/DL
GLUCOSE SERPL-MCNC: 76 MG/DL (ref 65–99)
HCT VFR BLD AUTO: 38 % (ref 34–46.6)
HGB BLD-MCNC: 12.1 G/DL (ref 12–15.9)
IMM GRANULOCYTES # BLD AUTO: 0.01 10*3/MM3 (ref 0–0.05)
IMM GRANULOCYTES NFR BLD AUTO: 0.2 % (ref 0–0.5)
LYMPHOCYTES # BLD AUTO: 1.02 10*3/MM3 (ref 0.7–3.1)
LYMPHOCYTES NFR BLD AUTO: 20.3 % (ref 19.6–45.3)
MCH RBC QN AUTO: 32.1 PG (ref 26.6–33)
MCHC RBC AUTO-ENTMCNC: 31.8 G/DL (ref 31.5–35.7)
MCV RBC AUTO: 100.8 FL (ref 79–97)
MONOCYTES # BLD AUTO: 0.8 10*3/MM3 (ref 0.1–0.9)
MONOCYTES NFR BLD AUTO: 15.9 % (ref 5–12)
NEUTROPHILS NFR BLD AUTO: 2.93 10*3/MM3 (ref 1.7–7)
NEUTROPHILS NFR BLD AUTO: 58.2 % (ref 42.7–76)
NRBC BLD AUTO-RTO: 0 /100 WBC (ref 0–0.2)
PLATELET # BLD AUTO: 185 10*3/MM3 (ref 140–450)
PMV BLD AUTO: 10.3 FL (ref 6–12)
POTASSIUM SERPL-SCNC: 3.9 MMOL/L (ref 3.5–5.2)
PROT SERPL-MCNC: 7.5 G/DL (ref 6–8.5)
RBC # BLD AUTO: 3.77 10*6/MM3 (ref 3.77–5.28)
SODIUM SERPL-SCNC: 141 MMOL/L (ref 136–145)
WBC NRBC COR # BLD AUTO: 5.03 10*3/MM3 (ref 3.4–10.8)

## 2024-11-08 PROCEDURE — 36415 COLL VENOUS BLD VENIPUNCTURE: CPT

## 2024-11-08 PROCEDURE — 85025 COMPLETE CBC W/AUTO DIFF WBC: CPT | Performed by: STUDENT IN AN ORGANIZED HEALTH CARE EDUCATION/TRAINING PROGRAM

## 2024-11-08 PROCEDURE — 80053 COMPREHEN METABOLIC PANEL: CPT | Performed by: STUDENT IN AN ORGANIZED HEALTH CARE EDUCATION/TRAINING PROGRAM

## 2024-11-08 RX ORDER — LEVOTHYROXINE SODIUM 88 UG/1
88 TABLET ORAL DAILY
COMMUNITY
Start: 2024-10-24 | End: 2025-10-24

## 2025-08-21 ENCOUNTER — HOSPITAL ENCOUNTER (OUTPATIENT)
Dept: MAMMOGRAPHY | Facility: HOSPITAL | Age: OVER 89
Discharge: HOME | End: 2025-08-21
Admitting: STUDENT IN AN ORGANIZED HEALTH CARE EDUCATION/TRAINING PROGRAM
Payer: MEDICARE

## 2025-08-21 DIAGNOSIS — C50.412 MALIGNANT NEOPLASM OF UPPER-OUTER QUADRANT OF LEFT BREAST IN FEMALE, ESTROGEN RECEPTOR POSITIVE: ICD-10-CM

## 2025-08-21 DIAGNOSIS — Z17.0 MALIGNANT NEOPLASM OF UPPER-OUTER QUADRANT OF LEFT BREAST IN FEMALE, ESTROGEN RECEPTOR POSITIVE: ICD-10-CM

## 2025-08-21 DIAGNOSIS — Z12.31 SCREENING MAMMOGRAM FOR BREAST CANCER: ICD-10-CM

## 2025-08-21 PROCEDURE — 77067 SCR MAMMO BI INCL CAD: CPT

## 2025-08-21 PROCEDURE — 77063 BREAST TOMOSYNTHESIS BI: CPT

## (undated) DEVICE — SENSR O2 OXIMAX FNGR A/ 18IN NONSTR

## (undated) DEVICE — LN SMPL CO2 SHTRM SD STREAM W/M LUER

## (undated) DEVICE — CANN O2 ETCO2 FITS ALL CONN CO2 SMPL A/ 7IN DISP LF

## (undated) DEVICE — ADAPT CLN BIOGUARD AIR/H2O DISP

## (undated) DEVICE — THE SINGLE USE ETRAP – POLYP TRAP IS USED FOR SUCTION RETRIEVAL OF ENDOSCOPICALLY REMOVED POLYPS.: Brand: ETRAP

## (undated) DEVICE — KT ORCA ORCAPOD DISP STRL

## (undated) DEVICE — SNAR POLYP CAPTIVATOR RND STFF 2.4 240CM 10MM 1P/U

## (undated) DEVICE — TUBING, SUCTION, 1/4" X 10', STRAIGHT: Brand: MEDLINE